# Patient Record
Sex: MALE | Race: WHITE | NOT HISPANIC OR LATINO | Employment: FULL TIME | URBAN - METROPOLITAN AREA
[De-identification: names, ages, dates, MRNs, and addresses within clinical notes are randomized per-mention and may not be internally consistent; named-entity substitution may affect disease eponyms.]

---

## 2021-06-05 ENCOUNTER — OFFICE VISIT (OUTPATIENT)
Dept: URGENT CARE | Facility: CLINIC | Age: 61
End: 2021-06-05
Payer: COMMERCIAL

## 2021-06-05 ENCOUNTER — APPOINTMENT (EMERGENCY)
Dept: CT IMAGING | Facility: HOSPITAL | Age: 61
DRG: 300 | End: 2021-06-05
Payer: COMMERCIAL

## 2021-06-05 ENCOUNTER — HOSPITAL ENCOUNTER (INPATIENT)
Facility: HOSPITAL | Age: 61
LOS: 2 days | Discharge: HOME | DRG: 300 | End: 2021-06-07
Attending: EMERGENCY MEDICINE | Admitting: INTERNAL MEDICINE
Payer: COMMERCIAL

## 2021-06-05 VITALS — HEART RATE: 73 BPM | RESPIRATION RATE: 18 BRPM | DIASTOLIC BLOOD PRESSURE: 50 MMHG | SYSTOLIC BLOOD PRESSURE: 92 MMHG

## 2021-06-05 DIAGNOSIS — I83.029 VENOUS ULCER OF LEFT LOWER EXTREMITY WITH VARICOSE VEINS (HCC): ICD-10-CM

## 2021-06-05 DIAGNOSIS — S81.802A WOUND OF LEFT LOWER EXTREMITY, INITIAL ENCOUNTER: Primary | ICD-10-CM

## 2021-06-05 DIAGNOSIS — I83.892 BLEEDING FROM VARICOSE VEINS OF LEFT LOWER EXTREMITY: Primary | ICD-10-CM

## 2021-06-05 DIAGNOSIS — L97.929 VENOUS ULCER OF LEFT LOWER EXTREMITY WITH VARICOSE VEINS (HCC): ICD-10-CM

## 2021-06-05 DIAGNOSIS — L03.116 CELLULITIS OF LEFT LEG: ICD-10-CM

## 2021-06-05 PROBLEM — I83.899 BLEEDING FROM VARICOSE VEIN: Status: ACTIVE | Noted: 2021-06-05

## 2021-06-05 PROBLEM — D62 ACUTE BLOOD LOSS ANEMIA: Status: ACTIVE | Noted: 2021-06-05

## 2021-06-05 PROBLEM — E66.9 OBESITY: Status: ACTIVE | Noted: 2021-06-05

## 2021-06-05 PROBLEM — I10 ESSENTIAL HYPERTENSION: Status: ACTIVE | Noted: 2017-12-11

## 2021-06-05 LAB
ALBUMIN SERPL BCP-MCNC: 3 G/DL (ref 3.5–5)
ALP SERPL-CCNC: 72 U/L (ref 46–116)
ALT SERPL W P-5'-P-CCNC: 37 U/L (ref 12–78)
ANION GAP SERPL CALCULATED.3IONS-SCNC: 10 MMOL/L (ref 4–13)
APTT PPP: 25 SECONDS (ref 23–37)
AST SERPL W P-5'-P-CCNC: 26 U/L (ref 5–45)
BASOPHILS # BLD AUTO: 0.03 THOUSANDS/ΜL (ref 0–0.1)
BASOPHILS NFR BLD AUTO: 0 % (ref 0–1)
BILIRUB SERPL-MCNC: 0.71 MG/DL (ref 0.2–1)
BUN SERPL-MCNC: 24 MG/DL (ref 5–25)
CALCIUM ALBUM COR SERPL-MCNC: 9 MG/DL (ref 8.3–10.1)
CALCIUM SERPL-MCNC: 8.2 MG/DL (ref 8.3–10.1)
CHLORIDE SERPL-SCNC: 106 MMOL/L (ref 100–108)
CO2 SERPL-SCNC: 25 MMOL/L (ref 21–32)
CREAT SERPL-MCNC: 1.25 MG/DL (ref 0.6–1.3)
EOSINOPHIL # BLD AUTO: 0.41 THOUSAND/ΜL (ref 0–0.61)
EOSINOPHIL NFR BLD AUTO: 5 % (ref 0–6)
ERYTHROCYTE [DISTWIDTH] IN BLOOD BY AUTOMATED COUNT: 14.1 % (ref 11.6–15.1)
GFR SERPL CREATININE-BSD FRML MDRD: 63 ML/MIN/1.73SQ M
GLUCOSE SERPL-MCNC: 137 MG/DL (ref 65–140)
HCT VFR BLD AUTO: 35.9 % (ref 36.5–49.3)
HGB BLD-MCNC: 11.1 G/DL (ref 12–17)
IMM GRANULOCYTES # BLD AUTO: 0.05 THOUSAND/UL (ref 0–0.2)
IMM GRANULOCYTES NFR BLD AUTO: 1 % (ref 0–2)
INR PPP: 1.19 (ref 0.84–1.19)
LYMPHOCYTES # BLD AUTO: 1.25 THOUSANDS/ΜL (ref 0.6–4.47)
LYMPHOCYTES NFR BLD AUTO: 15 % (ref 14–44)
MCH RBC QN AUTO: 26.2 PG (ref 26.8–34.3)
MCHC RBC AUTO-ENTMCNC: 30.9 G/DL (ref 31.4–37.4)
MCV RBC AUTO: 85 FL (ref 82–98)
MONOCYTES # BLD AUTO: 0.65 THOUSAND/ΜL (ref 0.17–1.22)
MONOCYTES NFR BLD AUTO: 8 % (ref 4–12)
NEUTROPHILS # BLD AUTO: 6.22 THOUSANDS/ΜL (ref 1.85–7.62)
NEUTS SEG NFR BLD AUTO: 71 % (ref 43–75)
NRBC BLD AUTO-RTO: 0 /100 WBCS
PLATELET # BLD AUTO: 297 THOUSANDS/UL (ref 149–390)
PMV BLD AUTO: 9.6 FL (ref 8.9–12.7)
POTASSIUM SERPL-SCNC: 4.6 MMOL/L (ref 3.5–5.3)
PROT SERPL-MCNC: 6.4 G/DL (ref 6.4–8.2)
PROTHROMBIN TIME: 14.5 SECONDS (ref 11.6–14.5)
RBC # BLD AUTO: 4.23 MILLION/UL (ref 3.88–5.62)
SODIUM SERPL-SCNC: 141 MMOL/L (ref 136–145)
WBC # BLD AUTO: 8.61 THOUSAND/UL (ref 4.31–10.16)

## 2021-06-05 PROCEDURE — 99285 EMERGENCY DEPT VISIT HI MDM: CPT

## 2021-06-05 PROCEDURE — 73701 CT LOWER EXTREMITY W/DYE: CPT

## 2021-06-05 PROCEDURE — 99285 EMERGENCY DEPT VISIT HI MDM: CPT | Performed by: EMERGENCY MEDICINE

## 2021-06-05 PROCEDURE — 85610 PROTHROMBIN TIME: CPT | Performed by: EMERGENCY MEDICINE

## 2021-06-05 PROCEDURE — 96365 THER/PROPH/DIAG IV INF INIT: CPT

## 2021-06-05 PROCEDURE — 80053 COMPREHEN METABOLIC PANEL: CPT | Performed by: EMERGENCY MEDICINE

## 2021-06-05 PROCEDURE — 99203 OFFICE O/P NEW LOW 30 MIN: CPT | Performed by: PREVENTIVE MEDICINE

## 2021-06-05 PROCEDURE — 85025 COMPLETE CBC W/AUTO DIFF WBC: CPT | Performed by: EMERGENCY MEDICINE

## 2021-06-05 PROCEDURE — 36415 COLL VENOUS BLD VENIPUNCTURE: CPT | Performed by: EMERGENCY MEDICINE

## 2021-06-05 PROCEDURE — 99222 1ST HOSP IP/OBS MODERATE 55: CPT | Performed by: INTERNAL MEDICINE

## 2021-06-05 PROCEDURE — S9088 SERVICES PROVIDED IN URGENT: HCPCS | Performed by: PREVENTIVE MEDICINE

## 2021-06-05 PROCEDURE — 96375 TX/PRO/DX INJ NEW DRUG ADDON: CPT

## 2021-06-05 PROCEDURE — 85730 THROMBOPLASTIN TIME PARTIAL: CPT | Performed by: EMERGENCY MEDICINE

## 2021-06-05 RX ORDER — OXYCODONE HYDROCHLORIDE 5 MG/1
7.5 TABLET ORAL EVERY 4 HOURS PRN
Status: DISCONTINUED | OUTPATIENT
Start: 2021-06-05 | End: 2021-06-07 | Stop reason: HOSPADM

## 2021-06-05 RX ORDER — ONDANSETRON 2 MG/ML
4 INJECTION INTRAMUSCULAR; INTRAVENOUS EVERY 6 HOURS PRN
Status: DISCONTINUED | OUTPATIENT
Start: 2021-06-05 | End: 2021-06-07 | Stop reason: HOSPADM

## 2021-06-05 RX ORDER — COLLAGENASE SANTYL 250 [ARB'U]/G
OINTMENT TOPICAL
Status: ON HOLD | COMMUNITY
Start: 2021-05-26 | End: 2021-08-12 | Stop reason: ALTCHOICE

## 2021-06-05 RX ORDER — FEXOFENADINE HCL 180 MG/1
1 TABLET ORAL DAILY
COMMUNITY

## 2021-06-05 RX ORDER — ACETAMINOPHEN 325 MG/1
975 TABLET ORAL ONCE
Status: DISCONTINUED | OUTPATIENT
Start: 2021-06-05 | End: 2021-06-05 | Stop reason: HOSPADM

## 2021-06-05 RX ORDER — LISINOPRIL 20 MG/1
TABLET ORAL
COMMUNITY
Start: 2021-03-11 | End: 2022-02-22

## 2021-06-05 RX ORDER — ACETAMINOPHEN 325 MG/1
650 TABLET ORAL EVERY 6 HOURS PRN
Status: DISCONTINUED | OUTPATIENT
Start: 2021-06-05 | End: 2021-06-07 | Stop reason: HOSPADM

## 2021-06-05 RX ORDER — NAPROXEN SODIUM 220 MG
1 TABLET ORAL EVERY 8 HOURS
COMMUNITY

## 2021-06-05 RX ORDER — CEPHALEXIN 500 MG/1
500 CAPSULE ORAL 4 TIMES DAILY
COMMUNITY
Start: 2021-06-01 | End: 2021-06-07 | Stop reason: HOSPADM

## 2021-06-05 RX ORDER — MORPHINE SULFATE 4 MG/ML
4 INJECTION, SOLUTION INTRAMUSCULAR; INTRAVENOUS ONCE
Status: COMPLETED | OUTPATIENT
Start: 2021-06-05 | End: 2021-06-05

## 2021-06-05 RX ORDER — SERTRALINE HYDROCHLORIDE 100 MG/1
TABLET, FILM COATED ORAL
COMMUNITY
Start: 2021-03-11 | End: 2022-02-22 | Stop reason: SDUPTHER

## 2021-06-05 RX ORDER — CLINDAMYCIN PHOSPHATE 600 MG/50ML
600 INJECTION INTRAVENOUS ONCE
Status: COMPLETED | OUTPATIENT
Start: 2021-06-05 | End: 2021-06-05

## 2021-06-05 RX ORDER — METOPROLOL SUCCINATE 100 MG/1
TABLET, EXTENDED RELEASE ORAL
COMMUNITY
Start: 2021-03-11 | End: 2022-02-22 | Stop reason: SDUPTHER

## 2021-06-05 RX ORDER — OXYCODONE HYDROCHLORIDE 5 MG/1
5 TABLET ORAL EVERY 4 HOURS PRN
Status: DISCONTINUED | OUTPATIENT
Start: 2021-06-05 | End: 2021-06-07 | Stop reason: HOSPADM

## 2021-06-05 RX ORDER — METOPROLOL SUCCINATE 100 MG/1
100 TABLET, EXTENDED RELEASE ORAL DAILY
Status: DISCONTINUED | OUTPATIENT
Start: 2021-06-06 | End: 2021-06-07 | Stop reason: HOSPADM

## 2021-06-05 RX ORDER — SERTRALINE HYDROCHLORIDE 25 MG/1
25 TABLET, FILM COATED ORAL DAILY
Status: DISCONTINUED | OUTPATIENT
Start: 2021-06-06 | End: 2021-06-07 | Stop reason: HOSPADM

## 2021-06-05 RX ORDER — LORATADINE 10 MG/1
10 TABLET ORAL DAILY
Status: DISCONTINUED | OUTPATIENT
Start: 2021-06-06 | End: 2021-06-07 | Stop reason: HOSPADM

## 2021-06-05 RX ORDER — ACETAMINOPHEN AND CODEINE PHOSPHATE 300; 30 MG/1; MG/1
1 TABLET ORAL EVERY 12 HOURS PRN
COMMUNITY
Start: 2021-06-01 | End: 2021-06-08

## 2021-06-05 RX ORDER — HYDROMORPHONE HCL IN WATER/PF 6 MG/30 ML
0.2 PATIENT CONTROLLED ANALGESIA SYRINGE INTRAVENOUS EVERY 4 HOURS PRN
Status: DISCONTINUED | OUTPATIENT
Start: 2021-06-05 | End: 2021-06-07 | Stop reason: HOSPADM

## 2021-06-05 RX ADMIN — AMPICILLIN SODIUM AND SULBACTAM SODIUM 3 G: 10; 5 INJECTION, POWDER, FOR SOLUTION INTRAVENOUS at 22:54

## 2021-06-05 RX ADMIN — IOHEXOL 100 ML: 350 INJECTION, SOLUTION INTRAVENOUS at 18:22

## 2021-06-05 RX ADMIN — OXYCODONE HYDROCHLORIDE 7.5 MG: 5 TABLET ORAL at 23:49

## 2021-06-05 RX ADMIN — CLINDAMYCIN PHOSPHATE 600 MG: 600 INJECTION, SOLUTION INTRAVENOUS at 16:28

## 2021-06-05 RX ADMIN — VANCOMYCIN HYDROCHLORIDE 2000 MG: 1 INJECTION, POWDER, LYOPHILIZED, FOR SOLUTION INTRAVENOUS at 23:39

## 2021-06-05 RX ADMIN — MORPHINE SULFATE 4 MG: 4 INJECTION INTRAVENOUS at 17:29

## 2021-06-05 RX ADMIN — ACETAMINOPHEN 975 MG: 325 TABLET ORAL at 15:00

## 2021-06-05 NOTE — PATIENT INSTRUCTIONS
Venous Insufficiency   AMBULATORY CARE:   Venous insufficiency  is a condition that prevents blood from flowing out of your legs and back to your heart  Veins contain valves that help blood flow in one direction  Venous insufficiency means the valves do not close correctly or fully  Blood flows back and pools in your leg  This can cause problems such as varicose veins  Venous insufficiency may also be called chronic venous insufficiency or venous stasis  Common signs and symptoms:   · Visible veins on your legs that may be small and red or large, thick, and blue    · Swelling in your ankles or calves    · Changes in skin color, such as dark or purple skin    · An ulcer (open sore) on your leg    · Leg pain that is worse when you are menstruating (women) or when you stand, and better when you elevate your legs    · Burning or itching    · Cramps that happen at night    · Thick, hard skin on your legs and ankles    · Feeling of heaviness in your legs    Seek care immediately if:   · You have a wound that does not heal or is infected  · You have an injury that has broken your skin and caused your varicose veins to bleed  · Your leg is swollen and hard  · You have pain in your leg that does not go away or gets worse  · Your legs or feet are turning blue or black  · Your leg feels warm, tender, and painful  It may look swollen and red  Contact your healthcare provider if:   · You have a fever  · You have varicose veins and they are painful  · You have new or worsening leg pain, swelling, or redness  · You have new or worsening ulcers or other sores on your leg  · You have questions or concerns about your condition or care  Treatment  may include any of the following:  · Medicine  may be given to improve blood flow  The medicines may thin your blood or reduce swelling to help blood flow  You may also need medicine to treat a bacterial infection      · Ablation  is a procedure used to close varicose veins  A catheter is guided until it is near the vein  A device will then be guided to the area  The device may produce energy through radiofrequency or a laser  The energy creates heat that will close the blood vessel  · Sclerotherapy  is a procedure used to fade visible veins  Your healthcare provider will inject a liquid into a spider vein or varicose vein  The liquid causes irritation in the vein  The vein swells and sticks together  Your body will then absorb the vein  · Surgery  may be needed if other treatments do not work  Surgery may be used to repair a leg vein valve or to clip or tie off a vein so blood cannot flow through it  You may need to have a veins removed during surgery called stripping  Surgery may be used to bypass (go around) the damaged vein  Blood will flow through a vein transplanted from another part of your body  Manage your symptoms:   · Wear pressure stockings as directed  Pressure stockings help keep blood from pooling in your leg veins  Your healthcare provider can prescribe stockings that are right for you  Do not buy over-the-counter pressure stockings unless your healthcare provider says it is okay  They may not fit correctly or may have elastic that cuts off your circulation  Ask your healthcare provider when to start wearing pressure stockings and how long to wear them each day  · Do not sit or stand for long periods of time  If you have to sit for a long time, flex and extend your legs, feet, and ankles  Do this about 10 times every 30 minutes to help keep blood flowing  If you have to stand for a long time, take breaks and sit with your legs elevated  · Elevate your legs  Elevate your legs above the level of your heart to reduce swelling  Your healthcare provider may recommend that you keep your legs elevated for 30 minutes at a time  You may need to do this 3 to 4 times per day, or more if your healthcare provider recommends  · Do not smoke  Nicotine and other chemicals in cigarettes and cigars can cause blood vessel damage  Ask your healthcare provider for information if you currently smoke and need help to quit  E-cigarettes or smokeless tobacco still contain nicotine  Talk to your healthcare provider before you use these products  · Reach or maintain a healthy weight  Extra weight can make venous insufficiency worse  Ask your healthcare provider how much you should weigh  He can help you create a weight loss plan if you need to lose weight  · Exercise as directed  Walking can help increase blood flow in your calves  Ask your healthcare provider how much exercise you need each day and which exercises are best for you  · Care for your skin  Keep your skin clean  Do not use any soaps or lotions that may dry your skin  For example, do not use products that contain fragrance or alcohol  If you have a skin ulcer, your healthcare provider may recommend a wet-to-dry bandage  To do this, apply a wet bandage to your wound and allow it to dry  This will help remove drainage from your wound each time you change the bandage  Your healthcare provider will tell you how often to change your bandage and which kind of bandage to use  Check your wound for signs of infection, such as swelling or pus  · Go to physical therapy (PT) as directed  A physical therapist can help you increase movement and range of motion in your legs  Follow up with your healthcare provider as directed:  Write down your questions so you remember to ask them during your visits  © Copyright [address] Information is for End User's use only and may not be sold, redistributed or otherwise used for commercial purposes  All illustrations and images included in CareNotes® are the copyrighted property of [employer]  or [facility] [employer]eria   The above information is an  only  It is not intended as medical advice for individual conditions or treatments   Talk to your doctor, nurse or pharmacist before following any medical regimen to see if it is safe and effective for you

## 2021-06-05 NOTE — PROGRESS NOTES
[facility]        NAME: Bishop Ahmadi is a 62 y o  male  : 1960    MRN: [id number]  DATE: 2021  TIME: 3:27 PM    Assessment and Plan   Bleeding from varicose veins of left lower extremity [I83 [id number]]  1  Bleeding from varicose veins of left lower extremity  Transfer to other facility    acetaminophen (TYLENOL) tablet 975 mg         Patient Instructions       Follow up with PCP in 3-5 days  Proceed to  ER if symptoms worsen  Chief Complaint     Chief Complaint   Patient presents with    Bleeding/Bruising     L leg bleeding         History of Present Illness       62years old the male presents for evaluation left lower leg bleeding  There is no significant injury event  He has long-lasting history of lower leg ulcer from venous insufficiency  He has been to wound care  Today without injury event, he started to have bleeding from his the lateral lower leg  His wife wrap him a with the compression dressing and sent here  In evaluation, there is up skin defect at the squirting blood  Blood loss 500-1000cc in dressing  Tourniquet, Compression and Silver nitrate was applied  The bleeding was eventually stopped  The patient started to complain dizziness and light headedness afterward,pale and sweating  blood pressure 92/50,  He was put on 4 L oxygen, symptom improving  Ambulance was called and sent to ER  Review of Systems   Review of Systems   Constitutional: Negative  HENT: Negative  Respiratory: Negative  Cardiovascular: Negative  Skin: Positive for color change and wound  Neurological: Positive for dizziness and light-headedness  All other systems reviewed and are negative          Current Medications       Current Outpatient Medications:     acetaminophen-codeine (TYLENOL/CODEINE #3) 300-30 MG per tablet, Take 1 tablet by mouth every 12 (twelve) hours as needed, Disp: , Rfl:     cephalexin (KEFLEX) 500 mg capsule, Take 500 mg by mouth 4 (four) times a day, Disp: , Rfl:     fexofenadine (ALLEGRA) 180 MG tablet, Take 1 tablet by mouth daily, Disp: , Rfl:     lisinopril (ZESTRIL) 20 mg tablet, , Disp: , Rfl:     metoprolol succinate (TOPROL-XL) 100 mg 24 hr tablet, , Disp: , Rfl:     naproxen sodium (ALEVE) 220 MG tablet, Take 1 tablet by mouth every 8 (eight) hours, Disp: , Rfl:     Santyl ointment, , Disp: , Rfl:     sertraline (ZOLOFT) 100 mg tablet, , Disp: , Rfl:     Current Facility-Administered Medications:     acetaminophen (TYLENOL) tablet 975 mg, 975 mg, Oral, Once, Justin Chávez MD    Current Allergies     Allergies as of 06/05/2021    (No Known Allergies)            The following portions of the patient's history were reviewed and updated as appropriate: allergies, current medications, past family history, past medical history, past social history, past surgical history and problem list      Past Medical History:   Diagnosis Date    Anxiety     Hypertension     Venous stasis ulcers of both lower extremities ([facility] [address] )        History reviewed  No pertinent surgical history  History reviewed  No pertinent family history  Medications have been verified  Objective   BP 92/50   Pulse 73   Resp 18        Physical Exam     Physical Exam  Vitals signs and nursing note reviewed  Constitutional:       General: He is in acute distress  Appearance: He is well-developed  Cardiovascular:      Rate and Rhythm: Normal rate and regular rhythm  Pulmonary:      Effort: Pulmonary effort is normal       Breath sounds: Normal breath sounds  Abdominal:      General: Bowel sounds are normal       Palpations: Abdomen is soft  Skin:     General: Skin is warm  Capillary Refill: Capillary refill takes less than 2 seconds  Findings: Lesion and wound present

## 2021-06-05 NOTE — ED PROVIDER NOTES
History  Chief Complaint   Patient presents with    Skin Lesion w/Prolonged Bleeding     Patient has known ulcer on his left leg that he had checked at urgent care due to bleeding and they stated that he lost appoximately 1 L per EMS     Sent in from urgent care for bleeding from chronic wound on left lower extremity  Wound appears dusky  Has 2 areas of acute punctate bleeding  Report was patient lost approximately 1 L of blood prior to arrival from this wound  No blood thinners  With states the wound is secondary to venous stasis blisters that have sloughed off  No fevers or chills, no tracking redness  Recently started keflex OP but does not seem to be improving the wound clinically  Prior to Admission Medications   Prescriptions Last Dose Informant Patient Reported? Taking? Santyl ointment 6/5/2021 at Unknown time  Yes Yes   acetaminophen-codeine (TYLENOL/CODEINE #3) 300-30 MG per tablet 6/4/2021 at Unknown time  Yes Yes   Sig: Take 1 tablet by mouth every 12 (twelve) hours as needed   cephalexin (KEFLEX) 500 mg capsule 6/4/2021 at Unknown time  Yes Yes   Sig: Take 500 mg by mouth 4 (four) times a day   fexofenadine (ALLEGRA) 180 MG tablet 6/5/2021 at Unknown time  Yes Yes   Sig: Take 1 tablet by mouth daily   lisinopril (ZESTRIL) 20 mg tablet 6/5/2021 at Unknown time  Yes Yes   metoprolol succinate (TOPROL-XL) 100 mg 24 hr tablet 6/5/2021 at Unknown time  Yes Yes   naproxen sodium (ALEVE) 220 MG tablet 6/5/2021 at Unknown time  Yes Yes   Sig: Take 1 tablet by mouth every 8 (eight) hours   sertraline (ZOLOFT) 100 mg tablet 6/5/2021 at Unknown time  Yes Yes      Facility-Administered Medications Last Administration Doses Remaining   acetaminophen (TYLENOL) tablet 975 mg 6/5/2021  3:00 PM 0          Past Medical History:   Diagnosis Date    Anxiety     Hypertension     Venous stasis ulcers of both lower extremities ([facility] [address] )        History reviewed  No pertinent surgical history      History reviewed  No pertinent family history  I have reviewed and agree with the history as documented  E-Cigarette/Vaping     E-Cigarette/Vaping Substances     Social History     Tobacco Use    Smoking status: Never Smoker    Smokeless tobacco: Never Used   Substance Use Topics    Alcohol use: Yes     Alcohol/week: 5 0 standard drinks     Types: 3 Cans of beer, 2 Shots of liquor per week     Frequency: 4 or more times a week     Drinks per session: 3 or 4     Binge frequency: Never    Drug use: Never       Review of Systems   Constitutional: Negative for chills and fever  Respiratory: Negative for chest tightness and shortness of breath  Cardiovascular: Positive for leg swelling (Bilateral)  Negative for chest pain  Gastrointestinal: Negative for abdominal pain, nausea and vomiting  Musculoskeletal: Negative for back pain and neck pain  Left lower extremity pain   Skin: Positive for rash and wound (LLE, distal tib fib, anterior, with acute bleeding today)  Neurological: Negative for dizziness and headaches  Hematological: Does not bruise/bleed easily  Physical Exam  Physical Exam  Vitals signs reviewed  Constitutional:       General: He is not in acute distress  Appearance: He is well-developed  He is obese  He is not diaphoretic  HENT:      Head: Normocephalic and atraumatic  Eyes:      Conjunctiva/sclera: Conjunctivae normal    Neck:      Musculoskeletal: Normal range of motion and neck supple  Pulmonary:      Effort: Pulmonary effort is normal  No respiratory distress  Breath sounds: Normal breath sounds  Musculoskeletal: Normal range of motion  General: Tenderness ( left lower extremity wound) and signs of injury (Wound to left lower extremity, anterior distal tib-fib  Multiple areas of ulceration with some dusky skin ) present  Right lower leg: Edema present  Left lower leg: Edema present        Comments: Pulses intact to affected extremity Skin:     General: Skin is warm and dry  Coloration: Skin is not pale  Findings: Erythema ( erythema, pus drainage, areas of bleeding, controlled with surgical foam ) present  Neurological:      Mental Status: He is alert and oriented to person, place, and time  Cranial Nerves: No cranial nerve deficit     Psychiatric:         Behavior: Behavior normal          Vital Signs  ED Triage Vitals   Temperature Pulse Respirations Blood Pressure SpO2   06/05/21 1550 06/05/21 1550 06/05/21 1550 06/05/21 1550 06/05/21 1550   98 3 °F (36 8 °C) 76 18 134/77 99 %      Temp Source Heart Rate Source Patient Position - Orthostatic VS BP Location FiO2 (%)   06/05/21 1550 06/05/21 1550 06/05/21 1550 06/05/21 1550 --   Oral Monitor Lying Right arm       Pain Score       06/05/21 1729       6           Vitals:    06/05/21 1730 06/05/21 2000 06/05/21 2327 06/06/21 0719   BP: 134/81 125/80 132/76 132/84   Pulse: 81 74 79 72   Patient Position - Orthostatic VS: Lying Lying           Visual Acuity      ED Medications  Medications   loratadine (CLARITIN) tablet 10 mg (10 mg Oral Given 6/6/21 0830)   metoprolol succinate (TOPROL-XL) 24 hr tablet 100 mg (100 mg Oral Given 6/6/21 0830)   sertraline (ZOLOFT) tablet 25 mg (25 mg Oral Given 6/6/21 0830)   ampicillin-sulbactam (UNASYN) 3 g in sodium chloride 0 9 % 100 mL IVPB (3 g Intravenous New Bag 6/6/21 0541)   vancomycin (VANCOCIN) 2,000 mg in sodium chloride 0 9 % 500 mL IVPB (2,000 mg Intravenous New Bag 6/5/21 4189)   enoxaparin (LOVENOX) subcutaneous injection 40 mg (40 mg Subcutaneous Given 6/6/21 0830)   acetaminophen (TYLENOL) tablet 650 mg (has no administration in time range)   ondansetron (ZOFRAN) injection 4 mg (has no administration in time range)   oxyCODONE (ROXICODONE) IR tablet 5 mg (has no administration in time range)   oxyCODONE (ROXICODONE) IR tablet 7 5 mg (7 5 mg Oral Given 6/6/21 1960)   HYDROmorphone HCl (DILAUDID) injection 0 2 mg (has no administration in time range)   clindamycin (CLEOCIN) IVPB (premix in dextrose) 600 mg 50 mL (0 mg Intravenous Stopped 6/5/21 1658)   morphine (PF) 4 mg/mL injection 4 mg (4 mg Intravenous Given 6/5/21 1729)   iohexol (OMNIPAQUE) 350 MG/ML injection (SINGLE-DOSE) 100 mL (100 mL Intravenous Given 6/5/21 1822)       Diagnostic Studies  Results Reviewed     Procedure Component Value Units Date/Time    MRSA culture [[id number]] Collected: 06/06/21 0849    Lab Status: No result Specimen: Nares from Nose     Comprehensive metabolic panel [[id number]]  (Abnormal) Collected: 06/06/21 0543    Lab Status: Final result Specimen: Blood from Arm, Right Updated: 06/06/21 0715     Sodium 139 mmol/L      Potassium 4 4 mmol/L      Chloride 106 mmol/L      CO2 25 mmol/L      ANION GAP 8 mmol/L      BUN 18 mg/dL      Creatinine 0 94 mg/dL      Glucose 117 mg/dL      Calcium 8 2 mg/dL      Corrected Calcium 9 2 mg/dL      AST 15 U/L      ALT 30 U/L      Alkaline Phosphatase 62 U/L      Total Protein 6 3 g/dL      Albumin 2 8 g/dL      Total Bilirubin 0 41 mg/dL      eGFR 88 ml/min/1 73sq m     Narrative:      [place] guidelines for Chronic Kidney Disease (CKD):     Stage 1 with normal or high GFR (GFR > 90 mL/min/1 73 square meters)    Stage 2 Mild CKD (GFR = 60-89 mL/min/1 73 square meters)    Stage 3A Moderate CKD (GFR = 45-59 mL/min/1 73 square meters)    Stage 3B Moderate CKD (GFR = 30-44 mL/min/1 73 square meters)    Stage 4 Severe CKD (GFR = 15-29 mL/min/1 73 square meters)    Stage 5 End Stage CKD (GFR <15 mL/min/1 73 square meters)  Note: GFR calculation is accurate only with a steady state creatinine    CBC and differential [[id number]]  (Abnormal) Collected: 06/06/21 0543    Lab Status: Final result Specimen: Blood from Arm, Right Updated: 06/06/21 0657     WBC 8 70 Thousand/uL      RBC 3 87 Million/uL      Hemoglobin 10 4 g/dL      Hematocrit 33 3 %      MCV 86 fL      MCH 26 9 pg      MCHC 31 2 g/dL      RDW 14 3 %      MPV 9 6 fL      Platelets 347 Thousands/uL      nRBC 0 /100 WBCs      Neutrophils Relative 76 %      Immat GRANS % 1 %      Lymphocytes Relative 13 %      Monocytes Relative 7 %      Eosinophils Relative 3 %      Basophils Relative 0 %      Neutrophils Absolute 6 62 Thousands/µL      Immature Grans Absolute 0 05 Thousand/uL      Lymphocytes Absolute 1 11 Thousands/µL      Monocytes Absolute 0 63 Thousand/µL      Eosinophils Absolute 0 26 Thousand/µL      Basophils Absolute 0 03 Thousands/µL     Platelet count [[id number]]     Lab Status: No result Specimen: Blood     Comprehensive metabolic panel [[id number]]  (Abnormal) Collected: 06/05/21 1626    Lab Status: Final result Specimen: Blood from Arm, Right Updated: 06/05/21 1651     Sodium 141 mmol/L      Potassium 4 6 mmol/L      Chloride 106 mmol/L      CO2 25 mmol/L      ANION GAP 10 mmol/L      BUN 24 mg/dL      Creatinine 1 25 mg/dL      Glucose 137 mg/dL      Calcium 8 2 mg/dL      Corrected Calcium 9 0 mg/dL      AST 26 U/L      ALT 37 U/L      Alkaline Phosphatase 72 U/L      Total Protein 6 4 g/dL      Albumin 3 0 g/dL      Total Bilirubin 0 71 mg/dL      eGFR 63 ml/min/1 73sq m     Narrative:      [place] guidelines for Chronic Kidney Disease (CKD):     Stage 1 with normal or high GFR (GFR > 90 mL/min/1 73 square meters)    Stage 2 Mild CKD (GFR = 60-89 mL/min/1 73 square meters)    Stage 3A Moderate CKD (GFR = 45-59 mL/min/1 73 square meters)    Stage 3B Moderate CKD (GFR = 30-44 mL/min/1 73 square meters)    Stage 4 Severe CKD (GFR = 15-29 mL/min/1 73 square meters)    Stage 5 End Stage CKD (GFR <15 mL/min/1 73 square meters)  Note: GFR calculation is accurate only with a steady state creatinine    Protime-INR [[id number]]  (Normal) Collected: 06/05/21 1626    Lab Status: Final result Specimen: Blood from Arm, Right Updated: 06/05/21 1643     Protime 14 5 seconds      INR 1 19    APTT [[id number]] (Normal) Collected: 06/05/21 1626    Lab Status: Final result Specimen: Blood from Arm, Right Updated: 06/05/21 1643     PTT 25 seconds     CBC and differential [[id number]]  (Abnormal) Collected: 06/05/21 1626    Lab Status: Final result Specimen: Blood from Arm, Right Updated: 06/05/21 1632     WBC 8 61 Thousand/uL      RBC 4 23 Million/uL      Hemoglobin 11 1 g/dL      Hematocrit 35 9 %      MCV 85 fL      MCH 26 2 pg      MCHC 30 9 g/dL      RDW 14 1 %      MPV 9 6 fL      Platelets 549 Thousands/uL      nRBC 0 /100 WBCs      Neutrophils Relative 71 %      Immat GRANS % 1 %      Lymphocytes Relative 15 %      Monocytes Relative 8 %      Eosinophils Relative 5 %      Basophils Relative 0 %      Neutrophils Absolute 6 22 Thousands/µL      Immature Grans Absolute 0 05 Thousand/uL      Lymphocytes Absolute 1 25 Thousands/µL      Monocytes Absolute 0 65 Thousand/µL      Eosinophils Absolute 0 41 Thousand/µL      Basophils Absolute 0 03 Thousands/µL                  CT lower extremity w contrast left   Final Result by Taisha Crouch MD (06/05 1948)      Pretibial wound without superficial collections or subjacent evidence for osteomyelitis  Workstation performed: [id number]                    Procedures  Procedures         ED Course                                           MDM  Number of Diagnoses or Management Options  Diagnosis management comments: Left lower extremity wound  Bleeding controlled with surgifoam   Septic workup and imaging to further evaluate for infection  IV antibiotics  Likely will require admission for antibiotic failure for cellulitis and wound care         Amount and/or Complexity of Data Reviewed  Tests in the radiology section of CPT®: ordered and reviewed        Disposition  Final diagnoses:   Wound of left lower extremity, initial encounter   Cellulitis of left leg     Time reflects when diagnosis was documented in both MDM as applicable and the Disposition within this note Time User Action Codes Description Comment    6/5/2021  7:11 PM Roby Almendarez Add [H15 695F] Wound of left lower extremity, initial encounter     6/5/2021  7:11 PM Roby Almendarez Add [L03 [id number]] Cellulitis of left leg       ED Disposition     ED Disposition Condition Date/Time Comment    Admit Stable Sat Jun 5, 2021  8:43 PM Case was discussed with Bin Huff and the patient's admission status was agreed to be Admission Status: inpatient status to the service of Dr Bin Huff   Follow-up Information    None         Current Discharge Medication List      CONTINUE these medications which have NOT CHANGED    Details   acetaminophen-codeine (TYLENOL/CODEINE #3) 300-30 MG per tablet Take 1 tablet by mouth every 12 (twelve) hours as needed      cephalexin (KEFLEX) 500 mg capsule Take 500 mg by mouth 4 (four) times a day      fexofenadine (ALLEGRA) 180 MG tablet Take 1 tablet by mouth daily      lisinopril (ZESTRIL) 20 mg tablet       metoprolol succinate (TOPROL-XL) 100 mg 24 hr tablet       naproxen sodium (ALEVE) 220 MG tablet Take 1 tablet by mouth every 8 (eight) hours      Santyl ointment       sertraline (ZOLOFT) 100 mg tablet            No discharge procedures on file      PDMP Review     None          ED Provider  Electronically Signed by           Fredy Rodriguez DO  06/06/21 9336

## 2021-06-06 PROBLEM — L97.929 VENOUS ULCER OF LEFT LOWER EXTREMITY WITH VARICOSE VEINS (HCC): Status: ACTIVE | Noted: 2021-06-05

## 2021-06-06 PROBLEM — I83.029 VENOUS ULCER OF LEFT LOWER EXTREMITY WITH VARICOSE VEINS (HCC): Status: ACTIVE | Noted: 2021-06-05

## 2021-06-06 PROBLEM — I87.332 CHRONIC VENOUS HYPERTENSION (IDIOPATHIC) WITH ULCER AND INFLAMMATION OF LEFT LOWER EXTREMITY (CODE) (HCC): Status: ACTIVE | Noted: 2021-06-06

## 2021-06-06 LAB
ALBUMIN SERPL BCP-MCNC: 2.8 G/DL (ref 3.5–5)
ALP SERPL-CCNC: 62 U/L (ref 46–116)
ALT SERPL W P-5'-P-CCNC: 30 U/L (ref 12–78)
ANION GAP SERPL CALCULATED.3IONS-SCNC: 8 MMOL/L (ref 4–13)
AST SERPL W P-5'-P-CCNC: 15 U/L (ref 5–45)
BASOPHILS # BLD AUTO: 0.03 THOUSANDS/ΜL (ref 0–0.1)
BASOPHILS NFR BLD AUTO: 0 % (ref 0–1)
BILIRUB SERPL-MCNC: 0.41 MG/DL (ref 0.2–1)
BUN SERPL-MCNC: 18 MG/DL (ref 5–25)
CALCIUM ALBUM COR SERPL-MCNC: 9.2 MG/DL (ref 8.3–10.1)
CALCIUM SERPL-MCNC: 8.2 MG/DL (ref 8.3–10.1)
CHLORIDE SERPL-SCNC: 106 MMOL/L (ref 100–108)
CO2 SERPL-SCNC: 25 MMOL/L (ref 21–32)
CREAT SERPL-MCNC: 0.94 MG/DL (ref 0.6–1.3)
EOSINOPHIL # BLD AUTO: 0.26 THOUSAND/ΜL (ref 0–0.61)
EOSINOPHIL NFR BLD AUTO: 3 % (ref 0–6)
ERYTHROCYTE [DISTWIDTH] IN BLOOD BY AUTOMATED COUNT: 14.3 % (ref 11.6–15.1)
GFR SERPL CREATININE-BSD FRML MDRD: 88 ML/MIN/1.73SQ M
GLUCOSE SERPL-MCNC: 117 MG/DL (ref 65–140)
HCT VFR BLD AUTO: 33.3 % (ref 36.5–49.3)
HGB BLD-MCNC: 10.4 G/DL (ref 12–17)
IMM GRANULOCYTES # BLD AUTO: 0.05 THOUSAND/UL (ref 0–0.2)
IMM GRANULOCYTES NFR BLD AUTO: 1 % (ref 0–2)
LYMPHOCYTES # BLD AUTO: 1.11 THOUSANDS/ΜL (ref 0.6–4.47)
LYMPHOCYTES NFR BLD AUTO: 13 % (ref 14–44)
MCH RBC QN AUTO: 26.9 PG (ref 26.8–34.3)
MCHC RBC AUTO-ENTMCNC: 31.2 G/DL (ref 31.4–37.4)
MCV RBC AUTO: 86 FL (ref 82–98)
MONOCYTES # BLD AUTO: 0.63 THOUSAND/ΜL (ref 0.17–1.22)
MONOCYTES NFR BLD AUTO: 7 % (ref 4–12)
NEUTROPHILS # BLD AUTO: 6.62 THOUSANDS/ΜL (ref 1.85–7.62)
NEUTS SEG NFR BLD AUTO: 76 % (ref 43–75)
NRBC BLD AUTO-RTO: 0 /100 WBCS
PLATELET # BLD AUTO: 258 THOUSANDS/UL (ref 149–390)
PMV BLD AUTO: 9.6 FL (ref 8.9–12.7)
POTASSIUM SERPL-SCNC: 4.4 MMOL/L (ref 3.5–5.3)
PROT SERPL-MCNC: 6.3 G/DL (ref 6.4–8.2)
RBC # BLD AUTO: 3.87 MILLION/UL (ref 3.88–5.62)
SODIUM SERPL-SCNC: 139 MMOL/L (ref 136–145)
WBC # BLD AUTO: 8.7 THOUSAND/UL (ref 4.31–10.16)

## 2021-06-06 PROCEDURE — 87205 SMEAR GRAM STAIN: CPT | Performed by: INTERNAL MEDICINE

## 2021-06-06 PROCEDURE — 85025 COMPLETE CBC W/AUTO DIFF WBC: CPT | Performed by: INTERNAL MEDICINE

## 2021-06-06 PROCEDURE — 80053 COMPREHEN METABOLIC PANEL: CPT | Performed by: INTERNAL MEDICINE

## 2021-06-06 PROCEDURE — 87077 CULTURE AEROBIC IDENTIFY: CPT | Performed by: INTERNAL MEDICINE

## 2021-06-06 PROCEDURE — 99232 SBSQ HOSP IP/OBS MODERATE 35: CPT | Performed by: INTERNAL MEDICINE

## 2021-06-06 PROCEDURE — 87186 SC STD MICRODIL/AGAR DIL: CPT | Performed by: INTERNAL MEDICINE

## 2021-06-06 PROCEDURE — 87081 CULTURE SCREEN ONLY: CPT | Performed by: INTERNAL MEDICINE

## 2021-06-06 PROCEDURE — 99254 IP/OBS CNSLTJ NEW/EST MOD 60: CPT | Performed by: PHYSICIAN ASSISTANT

## 2021-06-06 PROCEDURE — 87070 CULTURE OTHR SPECIMN AEROBIC: CPT | Performed by: INTERNAL MEDICINE

## 2021-06-06 RX ADMIN — AMPICILLIN SODIUM AND SULBACTAM SODIUM 3 G: 10; 5 INJECTION, POWDER, FOR SOLUTION INTRAVENOUS at 16:22

## 2021-06-06 RX ADMIN — OXYCODONE HYDROCHLORIDE 7.5 MG: 5 TABLET ORAL at 23:58

## 2021-06-06 RX ADMIN — ENOXAPARIN SODIUM 40 MG: 40 INJECTION SUBCUTANEOUS at 08:30

## 2021-06-06 RX ADMIN — VANCOMYCIN HYDROCHLORIDE 2000 MG: 1 INJECTION, POWDER, LYOPHILIZED, FOR SOLUTION INTRAVENOUS at 11:40

## 2021-06-06 RX ADMIN — SERTRALINE 25 MG: 25 TABLET, FILM COATED ORAL at 08:30

## 2021-06-06 RX ADMIN — OXYCODONE HYDROCHLORIDE 7.5 MG: 5 TABLET ORAL at 16:22

## 2021-06-06 RX ADMIN — AMPICILLIN SODIUM AND SULBACTAM SODIUM 3 G: 10; 5 INJECTION, POWDER, FOR SOLUTION INTRAVENOUS at 05:41

## 2021-06-06 RX ADMIN — AMPICILLIN SODIUM AND SULBACTAM SODIUM 3 G: 10; 5 INJECTION, POWDER, FOR SOLUTION INTRAVENOUS at 21:33

## 2021-06-06 RX ADMIN — LORATADINE 10 MG: 10 TABLET ORAL at 08:30

## 2021-06-06 RX ADMIN — OXYCODONE HYDROCHLORIDE 7.5 MG: 5 TABLET ORAL at 08:28

## 2021-06-06 RX ADMIN — VANCOMYCIN HYDROCHLORIDE 2000 MG: 1 INJECTION, POWDER, LYOPHILIZED, FOR SOLUTION INTRAVENOUS at 22:24

## 2021-06-06 RX ADMIN — METOPROLOL SUCCINATE 100 MG: 100 TABLET, EXTENDED RELEASE ORAL at 08:30

## 2021-06-06 RX ADMIN — AMPICILLIN SODIUM AND SULBACTAM SODIUM 3 G: 10; 5 INJECTION, POWDER, FOR SOLUTION INTRAVENOUS at 10:54

## 2021-06-06 NOTE — ASSESSMENT & PLAN NOTE
Patient would benefit from weight loss after resolution of acute illness  BMI noted of 54   Potential candidate for bariatric surgery as outpatient

## 2021-06-06 NOTE — PROGRESS NOTES
[facility]  Progress Note - Frankie Cos 1960, 62 y o  male MRN: [id number]  Unit/Bed#: -01 Encounter: [id number]  Primary Care Provider: No primary care provider on file  Date and time admitted to hospital: 6/5/2021  3:46 PM    * Venous ulcer of left lower extremity with varicose veins ([address] )  Assessment & Plan  Patient does have LLE wound which is a Chronic problem which seems to be acutely worsening as evident by worsening pain and increased discharge concern for infection  Continue broad-spectrum antibiotic coverage with Unasyn and Vancomycin, check MRSA culture, attempt to obtain wound cultures  Patient's left lower extremity has significantly increased warmth compared to right leg on palpation  Case discussed with vascular surgery  Consult wound care    Bleeding from varicose vein  Assessment & Plan  Presented with bleeding varicose veins, currently stopped  Outpatient follow-up with vascular surgery, no further inpatient management required, bleeding varicose vein was distal to current chronic wound  Suspected area of bleeding was just proximal to the left lateral malleolus    Acute blood loss anemia  Assessment & Plan  In the setting of bleeding veins noted to be anemic  Now bleeding controlled  Monitor Hb  Chronic venous hypertension (idiopathic) with ulcer and inflammation of left lower extremity (CODE) (HCC)  Assessment & Plan  See plan of care above    Essential hypertension  Assessment & Plan  Continue metoprolol succinate    Holding lisinopril    Obesity  Assessment & Plan  Patient would benefit from weight loss after resolution of acute illness  BMI noted of 54  Potential candidate for bariatric surgery as outpatient       VTE Pharmacologic Prophylaxis:   Pharmacologic: Enoxaparin (Lovenox)        Code Status: Level 1 - Full Code      Subjective:   Patient still with intermittent severe throbbing pain left lower extremity, improved with p o  Oxycodone 7 5 mg x 1 today    Objective:     Vitals:   Temp (24hrs), Av 1 °F (36 7 °C), Min:98 °F (36 7 °C), Max:98 3 °F (36 8 °C)    Temp:  [98 °F (36 7 °C)-98 3 °F (36 8 °C)] 98 °F (36 7 °C)  HR:  [72-81] 72  Resp:  [16-18] 18  BP: ()/(38-84) 132/84  SpO2:  [94 %-99 %] 95 %  Body mass index is 54 88 kg/m²  Input and Output Summary (last 24 hours): Intake/Output Summary (Last 24 hours) at 2021 0950  Last data filed at 2021 0900  Gross per 24 hour   Intake 440 ml   Output 0 ml   Net 440 ml       Physical Exam:     Physical Exam  Vitals signs and nursing note reviewed  Constitutional:       General: He is not in acute distress  Appearance: He is obese  He is not ill-appearing, toxic-appearing or diaphoretic  HENT:      Head: Normocephalic  Neck:      Musculoskeletal: Normal range of motion  Cardiovascular:      Rate and Rhythm: Normal rate  Pulses: Normal pulses  Pulmonary:      Effort: Pulmonary effort is normal    Neurological:      General: No focal deficit present  Mental Status: He is alert  Psychiatric:         Mood and Affect: Mood normal          Behavior: Behavior normal          Thought Content: Thought content normal          Judgment: Judgment normal            Additional Data:     Labs:    Results from last 7 days   Lab Units 21  0543   WBC Thousand/uL 8 70   HEMOGLOBIN g/dL 10 4*   HEMATOCRIT % 33 3*   PLATELETS Thousands/uL 258   NEUTROS PCT % 76*   LYMPHS PCT % 13*   MONOS PCT % 7   EOS PCT % 3     Results from last 7 days   Lab Units 21  0543   POTASSIUM mmol/L 4 4   CHLORIDE mmol/L 106   CO2 mmol/L 25   BUN mg/dL 18   CREATININE mg/dL 0 94   CALCIUM mg/dL 8 2*   ALK PHOS U/L 62   ALT U/L 30   AST U/L 15     Results from last 7 days   Lab Units 21  1626   INR  1 19       * I Have Reviewed All Lab Data Listed Above  * Additional Pertinent Lab Tests Reviewed:  [address] Admission Reviewed      Recent Cultures (last 7 days):           Last 24 Hours Medication List:   Current Facility-Administered Medications   Medication Dose Route Frequency Provider Last Rate    acetaminophen  650 mg Oral Q6H PRN Henry Zhang MD      ampicillin-sulbactam  3 g Intravenous Q6H Henry Zhang MD 3 g (06/06/21 1221)    enoxaparin  40 mg Subcutaneous Daily Henry Zhang MD      HYDROmorphone  0 2 mg Intravenous Q4H PRN NOEL Mcnulty      loratadine  10 mg Oral Daily Henry Zhang MD      metoprolol succinate  100 mg Oral Daily Henry Zhang MD      ondansetron  4 mg Intravenous Q6H PRN Henry Zhang MD      oxyCODONE  5 mg Oral Q4H PRN NOEL Mcnulty      oxyCODONE  7 5 mg Oral Q4H PRN NOEL Mcnulty      sertraline  25 mg Oral Daily Henry Zhang MD      vancomycin  2,000 mg Intravenous Q12H Henry Zhang MD 2,000 mg (06/05/21 8855)        Today, Patient Was Seen By: Cynthia Watkins DO

## 2021-06-06 NOTE — ASSESSMENT & PLAN NOTE
Presented with bleeding varicose veins, currently stopped  Outpatient follow-up with vascular surgery, no further inpatient management required, bleeding varicose vein was distal to current chronic wound      Suspected area of bleeding was just proximal to the left lateral malleolus

## 2021-06-06 NOTE — PLAN OF CARE
Problem: Potential for Falls  Goal: Patient will remain free of falls  Description: INTERVENTIONS:  - Assess patient frequently for physical needs  -  Identify cognitive and physical deficits and behaviors that affect risk of falls  -  [place] fall precautions as indicated by assessment   - Educate patient/family on patient safety including physical limitations  - Instruct patient to call for assistance with activity based on assessment  - Modify environment to reduce risk of injury  - Consider OT/PT consult to assist with strengthening/mobility  Outcome: Progressing     Problem: Nutrition/Hydration-ADULT  Goal: Nutrient/Hydration intake appropriate for improving, restoring or maintaining nutritional needs  Description: Monitor and assess patient's nutrition/hydration status for malnutrition  Collaborate with interdisciplinary team and initiate plan and interventions as ordered  Monitor patient's weight and dietary intake as ordered or per policy  Utilize nutrition screening tool and intervene as necessary  Determine patient's food preferences and provide high-protein, high-caloric foods as appropriate       INTERVENTIONS:  - Monitor oral intake, urinary output, labs, and treatment plans  - Assess nutrition and hydration status and recommend course of action  - Evaluate amount of meals eaten  - Assist patient with eating if necessary   - Allow adequate time for meals  - Recommend/ encourage appropriate diets, oral nutritional supplements, and vitamin/mineral supplements  - Order, calculate, and assess calorie counts as needed  - Recommend, monitor, and adjust tube feedings and TPN/PPN based on assessed needs  - Assess need for intravenous fluids  - Provide specific nutrition/hydration education as appropriate  - Include patient/family/caregiver in decisions related to nutrition  Outcome: Progressing

## 2021-06-06 NOTE — ASSESSMENT & PLAN NOTE
-continue to encourage weight management, lifestyle modifications and diet modifications for adjuvant therapy of symptomatic varicose veins and venous hypertension  -weight management imperative for control of underlying venous disease and to facilitate LLE wound healing

## 2021-06-06 NOTE — ASSESSMENT & PLAN NOTE
43-year-old male nonsmoker with morbid obesity, HTN, depression and LLE chronic venous stasis with venous hypertension and chronic venous ulceration presents with worsening pain and drainage from left lower leg wounds x 1 week, LLE cellulitis and spontaneous left lateral malleolus varicosity bleeding on day of admission  Vascular surgery consulted for recommendations of long-term management  Diagnostics:  -CT LLE w/contrast 6/5/2021:  No evidence of subcutaneous gas, abscess or osteomyelitis  Pretibial wound as noted  Significant superficial venous dilatation and varicose veins  Patent popliteal and tibial vessels w/3V runoff  -LY at [facility] 5/10/2021: ABIs 1 2 bilaterally    Recommendations:  -chronic LLE venous stasis and venous hypertension with recurrent venous ulceration and extensive circumferential tissue loss and spontaneous bleed from L lateral malleolar superficial varicosity  Bleeding controlled w/direct pressure prior to arrival in ER  No recurrent bleed  -H/H stable  Hemodynamics stable  -LLE well perfused w/2+ palpable distal pulses  -compression and elevation of LLE for edema control  -continue local wound care    Consult Wound Care  -continue antibiotics per primary service  -weight management  -will require LEVDR as outpatient w/f/u in office to discuss possible venous intervention to facilitate wound healing  -will d/w Dr Pepe Nieves

## 2021-06-06 NOTE — PLAN OF CARE
Problem: Potential for Falls  Goal: Patient will remain free of falls  Description: INTERVENTIONS:  - Assess patient frequently for physical needs  -  Identify cognitive and physical deficits and behaviors that affect risk of falls  -  [place] fall precautions as indicated by assessment   - Educate patient/family on patient safety including physical limitations  - Instruct patient to call for assistance with activity based on assessment  - Modify environment to reduce risk of injury  - Consider OT/PT consult to assist with strengthening/mobility  Outcome: Progressing     Problem: Nutrition/Hydration-ADULT  Goal: Nutrient/Hydration intake appropriate for improving, restoring or maintaining nutritional needs  Description: Monitor and assess patient's nutrition/hydration status for malnutrition  Collaborate with interdisciplinary team and initiate plan and interventions as ordered  Monitor patient's weight and dietary intake as ordered or per policy  Utilize nutrition screening tool and intervene as necessary  Determine patient's food preferences and provide high-protein, high-caloric foods as appropriate       INTERVENTIONS:  - Monitor oral intake, urinary output, labs, and treatment plans  - Assess nutrition and hydration status and recommend course of action  - Evaluate amount of meals eaten  - Assist patient with eating if necessary   - Allow adequate time for meals  - Recommend/ encourage appropriate diets, oral nutritional supplements, and vitamin/mineral supplements  - Order, calculate, and assess calorie counts as needed  - Recommend, monitor, and adjust tube feedings and TPN/PPN based on assessed needs  - Assess need for intravenous fluids  - Provide specific nutrition/hydration education as appropriate  - Include patient/family/caregiver in decisions related to nutrition  Outcome: Progressing

## 2021-06-06 NOTE — CONSULTS
[address]  1960, 62 y o  male MRN: [id number]  Unit/Bed#: -01 Encounter: [id number]  Primary Care Provider: No primary care provider on file  Date and time admitted to hospital: 6/5/2021  3:46 PM    Inpatient consult to Vascular Surgery  Consult performed by: Katie Gallegos PA-C  Consult ordered by: Kenneth Segal MD        Chronic venous hypertension (idiopathic) with ulcer and inflammation of left lower extremity (CODE) ([facility] )  Assessment & Plan  52-year-old male nonsmoker with morbid obesity, HTN, depression and LLE chronic venous stasis with venous hypertension and chronic venous ulceration presents with worsening pain and drainage from left lower leg wounds x 1 week, LLE cellulitis and spontaneous left lateral malleolus varicosity bleeding on day of admission  Vascular surgery consulted for recommendations of long-term management  Diagnostics:  -CT LLE w/contrast 6/5/2021:  No evidence of subcutaneous gas, abscess or osteomyelitis  Pretibial wound as noted  Significant superficial venous dilatation and varicose veins  Patent popliteal and tibial vessels w/3V runoff  -LY at [facility] 5/10/2021: ABIs 1 2 bilaterally    Recommendations:  -chronic LLE venous stasis and venous hypertension with recurrent venous ulceration and extensive circumferential tissue loss and spontaneous bleed from L lateral malleolar superficial varicosity  Bleeding controlled w/direct pressure prior to arrival in ER  No recurrent bleed  -H/H stable  Hemodynamics stable  -LLE well perfused w/2+ palpable distal pulses  -compression and elevation of LLE for edema control  -continue local wound care    Consult Wound Care  -continue antibiotics per primary service  -weight management  -will require LEVDR as outpatient w/f/u in office to discuss possible venous intervention to facilitate wound healing  -d/w Dr Lolly Alaniz  -will d/w Dr Philip Senior    Bleeding from varicose vein  Assessment & Plan  -spontaneous bleed from superficial Left lateral malleolar varicosity  -controlled w/direct manual pressure and local tourniquet prior to arrival ER  Site clear without recurrent bleed  -H/H stable  No transfusion required  -1st episode of varicosity bleeding  -compression and elevation LLE for edema control  -see plan as outlined above    Obesity  Assessment & Plan  -continue to encourage weight management, lifestyle modifications and diet modifications for adjuvant therapy of symptomatic varicose veins and venous hypertension  -weight management imperative for control of underlying venous disease and to facilitate LLE wound healing      Essential hypertension  Assessment & Plan  -BP well controlled  -continue current medical regimen  -management per [facility]      Consulting Service: SLIM    Chief Complaint:  Decreasing pain and drainage from left lower extremity wounds x1 week and spontaneous bleeding wound left lower extremity varicosity on day of admission    HPI: Jayy George is a 62 y o  male nonsmoker with morbid obesity, HTN, depression and LLE chronic venous stasis with venous hypertension and chronic venous ulceration presents with worsening pain and serous drainage from left lower leg wounds x 1 week, LLE cellulitis and spontaneous left lateral malleolus varicosity bleeding on day of admission  Patient reports a chronic history of recurrence left lower leg venous ulcerations over the past 4 years  His current venous ulcers present for the past year and has become progressively worse since January of this year  The wounds initially started as multiple areas of blistering which ruptured and have progressed  Patient has been managing this himself with silver cream but presented to Wound Care a month ago for formal management  Patient has been undergoing regular debridement at [address]  which has become increasingly more painful    Patient now with extensive circumferential superficial ulceration of L pretibial area  CT LLE with contrast on admission negative for evidence of soft tissue abscess or necrotizing fasciitis  Patient denies claudication or rest pain and has readily palpable 2+ DP pulses  ABIs 1 2  Patient denies prior history of DVT, PE, recurrent lower extremity cellulitis, superficial thrombophlebitis or prior history of bleeding varicosities  On the day of admission, the patient had noted bleeding from underneath his leg graft while at work which was slid controlled with direct pressure and a form of tourniquet directly over the bleeding  Bleeding with had resolved by the time he presented to the emergency room  No recurrent bleeding  H&H stable and he did not require transfusion on arrival   Vascular surgery consulted for recommendations of long-term management  Diagnostics:  -CT LLE w/contrast 6/5/2021:  No evidence of subcutaneous gas, abscess or osteomyelitis  Pretibial wound as noted  Significant superficial venous dilatation and varicose veins  Patent popliteal and tibial vessels w/3V runoff  -LY at [facility] 5/10/2021: ABIs 1 2 bilaterally      Review of Systems:  General: negative  Cardiovascular: no chest pain or dyspnea on exertion  Respiratory: no cough, shortness of breath, or wheezing  Gastrointestinal: no abdominal pain, change in bowel habits, or black or bloody stools  Genitourinary ROS: no dysuria, trouble voiding, or hematuria  Musculoskeletal ROS:  As per the above HPI  Neurological ROS: no TIA or stroke symptoms  Hematological and Lymphatic ROS: negative  Dermatological ROS: As per the above HPI  Psychological ROS: negative  Ophthalmic ROS: negative  ENT ROS: negative    Past Medical History:  Past Medical History:   Diagnosis Date    Anxiety     Hypertension     Venous stasis ulcers of both lower extremities ([address] )        Past Surgical History:  History reviewed  No pertinent surgical history      Social History:  Social History Substance and Sexual Activity   Alcohol Use Yes    Alcohol/week: 5 0 standard drinks    Types: 3 Cans of beer, 2 Shots of liquor per week    Frequency: 4 or more times a week    Drinks per session: 3 or 4    Binge frequency: Never     Social History     Substance and Sexual Activity   Drug Use Never     Social History     Tobacco Use   Smoking Status Never Smoker   Smokeless Tobacco Never Used       Family History:  History reviewed  No pertinent family history  Allergies:  No Known Allergies    Medications:  Current Facility-Administered Medications   Medication Dose Route Frequency    acetaminophen (TYLENOL) tablet 650 mg  650 mg Oral Q6H PRN    ampicillin-sulbactam (UNASYN) 3 g in sodium chloride 0 9 % 100 mL IVPB  3 g Intravenous Q6H    enoxaparin (LOVENOX) subcutaneous injection 40 mg  40 mg Subcutaneous Daily    HYDROmorphone HCl (DILAUDID) injection 0 2 mg  0 2 mg Intravenous Q4H PRN    loratadine (CLARITIN) tablet 10 mg  10 mg Oral Daily    metoprolol succinate (TOPROL-XL) 24 hr tablet 100 mg  100 mg Oral Daily    ondansetron (ZOFRAN) injection 4 mg  4 mg Intravenous Q6H PRN    oxyCODONE (ROXICODONE) IR tablet 5 mg  5 mg Oral Q4H PRN    oxyCODONE (ROXICODONE) IR tablet 7 5 mg  7 5 mg Oral Q4H PRN    sertraline (ZOLOFT) tablet 25 mg  25 mg Oral Daily    vancomycin (VANCOCIN) 2,000 mg in sodium chloride 0 9 % 500 mL IVPB  2,000 mg Intravenous Q12H       Vitals:  /84   Pulse 72   Temp 98 °F (36 7 °C)   Resp 18   Ht 5' 10" (1 778 m)   Wt (!) 174 kg (382 lb 8 oz)   SpO2 95%   BMI 54 88 kg/m²     I/Os:  I/O last 24 hours:   In: 440 [P O :440]  Out: 0     Lab Results and Cultures:   Lab Results   Component Value Date    WBC 8 70 06/06/2021    HGB 10 4 (L) 06/06/2021    HCT 33 3 (L) 06/06/2021    MCV 86 06/06/2021     06/06/2021     Lab Results   Component Value Date    CALCIUM 8 2 (L) 06/06/2021    K 4 4 06/06/2021    CO2 25 06/06/2021     06/06/2021    BUN 18 06/06/2021    CREATININE 0 94 06/06/2021     Lab Results   Component Value Date    INR 1 19 06/05/2021    PROTIME 14 5 06/05/2021       Lipid Panel: No results found for: CHOL,     Blood Culture: No results found for: BLOODCX,   Urinalysis: No results found for: Karyna Boyers, SPECGRAV, PHUR, LEUKOCYTESUR, NITRITE, PROTEINUA, GLUCOSEU, KETONESU, BILIRUBINUR, BLOODU,   Urine Culture: No results found for: URINECX,   Wound Culure: No results found for: WOUNDCULT    Imaging:  CT LLE with contrast 6/5/2021 reviewed and as described above  ABIs from [facility] 5/10/2021 also reviewed and as described above  Physical Exam:    General appearance: alert and oriented, in no acute distress  Morbidly obese  Skin: Skin color, texture, turgor normal  No rashes or lesions  Neurologic: Grossly normal  Head: Normocephalic, without obvious abnormality, atraumatic  Eyes: PERRL, EOMI  Sclerae nonicteric  Throat: lips, mucosa, and tongue normal; teeth and gums normal  Neck: no adenopathy, no carotid bruit, no JVD, supple, symmetrical, trachea midline and thyroid not enlarged, symmetric, no tenderness/mass/nodules  Back: symmetric, no curvature  ROM normal  No CVA tenderness  Lungs: clear to auscultation bilaterally  Chest wall: no tenderness  Heart: regular rate and rhythm, S1, S2 normal, no murmur, click, rub or gallop  Abdomen: soft, non-tender; bowel sounds normal; no masses,  no organomegaly and Obese but nondistended  No abdominal bruits  Extremities: Bilateral lower extremities warm, pink, motor and sensory intact and well perfused with readily palpable 2+ DP pulses bilaterally  See Clinical images below  Extensive superficial tissue loss circumferential of left lower leg with scattered yellow debris in wound  No significant eschar  No purulent drainage  Mild periwound erythema  No crepitus      Wound/Incision:    LLE anterior    LLE lateral    LLE, medial    LLE      Pulse exam:  Radial: Right: 2+ Left[de-identified] 2+  DP: Right: 2+ Left: 2+  PT: Right: 1+ Left: 1+      Kelby Meckel, PA-C  6/6/2021  [facility], [phone]

## 2021-06-06 NOTE — ASSESSMENT & PLAN NOTE
Patient does have LLE wound which is a Chronic problem which seems to be acutely worsening as evident by worsening pain and increased discharge concern for infection    Will treat with antibiotics for now start broad coverage with Unasyn and Vancomycin, check MRSA culture, attempt to obtain wound cultures  Monitor clinically, temperature curve white count  Consult vascular surgery as they may also help with f/u regarding bleeding veins  Consult wound care

## 2021-06-06 NOTE — PROGRESS NOTES
Vancomycin IV Pharmacy-to-Dose Consultation    Lianne Keating is a 62 y o  male who is currently receiving Vancomycin IV with management by the Pharmacy Consult service  Assessment/Plan:  The patient was reviewed  Renal function is stable and no signs or symptoms of nephrotoxicity and/or infusion reactions were documented in the chart  Based on todays assessment, continue current vancomycin (day # 2) dosing of 2 g IV q12h, with a plan for trough to be drawn at 1100 on 6-7-21  We will continue to follow the patients culture results and clinical progress daily      Timothy Fabian, Pharmacist

## 2021-06-06 NOTE — H&P
[facility]  H&P- Tommie Weiss 1960, 62 y o  male MRN: [id number]  Unit/Bed#: -01 Encounter: [id number]  Primary Care Provider: No primary care provider on file  Date and time admitted to hospital: 6/5/2021  3:46 PM    * Open wound of left lower extremity  Assessment & Plan  Patient does have LLE wound which is a Chronic problem which seems to be acutely worsening as evident by worsening pain and increased discharge concern for infection  Will treat with antibiotics for now start broad coverage with Unasyn and Vancomycin, check MRSA culture, attempt to obtain wound cultures  Monitor clinically, temperature curve white count  Consult vascular surgery as they may also help with f/u regarding bleeding veins  Consult wound care    Obesity  Assessment & Plan  Patient would benefit from weight loss after resolution of acute illness  BMI noted of 54  Potential candidate for bariatric surgery as outpatient     Bleeding from varicose vein  Assessment & Plan  Presented with bleeding varicose veins, currently stopped  Will need vascular surgery follow up  Acute blood loss anemia  Assessment & Plan  In the setting of bleeding veins noted to be anemic  Now bleeding controlled  Monitor Hb  Essential hypertension - continue metoprolol    VTE Prophylaxis: Enoxaparin (Lovenox)  / sequential compression device   Code Status: FC  POLST: POLST form is not discussed and not completed at this time  Discussion with family:  Wife at the bedside    Anticipated Length of Stay:  Patient will be admitted on an Inpatient basis with an anticipated length of stay of  at least 2 midnights  Justification for Hospital Stay:  Worsening left lower extremity wound with worsening pain and discharged, concern for infection, concern for acute blood loss anemia    Total Time for Visit, including Counseling / Coordination of Care: 30 minutes    Greater than 50% of this total time spent on direct patient counseling and coordination of care  Chief Complaint:   Worsening LLE wound with pain and drainage; episode of bleeding through leg veins    History of Present Illness:    Josef Roth is a 62 y o  male who presents with LLE wound  Patient presented with large wound in left lower extremity surrounding erythema pain and drainage  He does endorse chronic wounds in left lower extremity, he has been fighting this for years, he follows at the [address] [place] [address] Road  The wound has closed however for the past year or so the wound has opened again  Recently for the past week or so he has complaining of worsening pain in the area an increased amount of discharge  He otherwise denies fever  A 2nd complain that he has is that he has had episodes of bleeding veins around the wound prior to coming to the hospital   He was noted to have significant pooling of blood in the left leg because he believes that "a vein bursted", he went to urgent care 1st, they placed a tourniquet there, he subsequently came here and was no longer bleeding  Now the bleeding has stopped completely  At the emergency department Vitals were essentially stable  Blood work revealed mildly reduced calcium at 8 2, hemoglobin of 11  Review of Systems:    Review of Systems   All other systems reviewed and are negative  Past Medical and Surgical History:     Past Medical History:   Diagnosis Date    Anxiety     Hypertension     Venous stasis ulcers of both lower extremities ([facility] [address] )        History reviewed  No pertinent surgical history  Meds/Allergies:    Prior to Admission medications    Medication Sig Start Date End Date Taking?  Authorizing Provider   acetaminophen-codeine (TYLENOL/CODEINE #3) 300-30 MG per tablet Take 1 tablet by mouth every 12 (twelve) hours as needed 6/1/21 6/8/21 Yes Historical Provider, MD   cephalexin (KEFLEX) 500 mg capsule Take 500 mg by mouth 4 (four) times a day 6/1/21 6/15/21 Yes Historical Provider, MD fexofenadine (ALLEGRA) 180 MG tablet Take 1 tablet by mouth daily   Yes Historical Provider, MD   lisinopril (ZESTRIL) 20 mg tablet  3/11/21  Yes Historical Provider, MD   metoprolol succinate (TOPROL-XL) 100 mg 24 hr tablet  3/11/21  Yes Historical Provider, MD   naproxen sodium (ALEVE) 220 MG tablet Take 1 tablet by mouth every 8 (eight) hours   Yes Historical Provider, MD   Santyl ointment  5/26/21  Yes Historical Provider, MD   sertraline (ZOLOFT) 100 mg tablet  3/11/21  Yes Historical Provider, MD     I have reviewed home medications with patient personally  Allergies: No Known Allergies    Social History:     Marital Status: /Civil Union   Substance Use History:   Social History     Substance and Sexual Activity   Alcohol Use Yes    Alcohol/week: 5 0 standard drinks    Types: 3 Cans of beer, 2 Shots of liquor per week    Frequency: 4 or more times a week    Drinks per session: 3 or 4    Binge frequency: Never     Social History     Tobacco Use   Smoking Status Never Smoker   Smokeless Tobacco Never Used     Social History     Substance and Sexual Activity   Drug Use Never       Family History:    History reviewed  No pertinent family history  Physical Exam:     Vitals:   Blood Pressure: 125/80 (06/05/21 2000)  Pulse: 74 (06/05/21 2000)  Temperature: 98 3 °F (36 8 °C) (06/05/21 1550)  Temp Source: Oral (06/05/21 1550)  Respirations: 17 (06/05/21 2000)  Height: 5' 10" (177 8 cm) (06/05/21 1550)  Weight - Scale: (!) 174 kg (382 lb 8 oz) (06/05/21 1550)  SpO2: 96 % (06/05/21 2000)    Physical Exam  Vitals signs and nursing note reviewed  Constitutional:       Appearance: Normal appearance  He is obese  Comments: Middle-age male in bed, awake   HENT:      Head: Normocephalic and atraumatic  Right Ear: External ear normal       Left Ear: External ear normal       Nose: Nose normal  No congestion or rhinorrhea        Mouth/Throat:      Mouth: Mucous membranes are moist       Pharynx: Oropharynx is clear  No oropharyngeal exudate or posterior oropharyngeal erythema  Eyes:      General: No scleral icterus  Right eye: No discharge  Left eye: No discharge  Pupils: Pupils are equal, round, and reactive to light  Neck:      Musculoskeletal: Normal range of motion  No neck rigidity or muscular tenderness  Vascular: No carotid bruit  Cardiovascular:      Rate and Rhythm: Normal rate and regular rhythm  Pulses: Normal pulses  Heart sounds: No murmur  No friction rub  No gallop  Pulmonary:      Effort: Pulmonary effort is normal  No respiratory distress  Breath sounds: Normal breath sounds  No stridor  No wheezing, rhonchi or rales  Abdominal:      General: Abdomen is flat  Bowel sounds are normal  There is no distension  Palpations: Abdomen is soft  There is no mass  Tenderness: There is no abdominal tenderness  There is no guarding or rebound  Hernia: No hernia is present  Musculoskeletal: Normal range of motion  General: No swelling, tenderness, deformity or signs of injury  Lymphadenopathy:      Cervical: No cervical adenopathy  Skin:     General: Skin is warm and dry  Capillary Refill: Capillary refill takes less than 2 seconds  Coloration: Skin is not jaundiced or pale  Findings: Erythema present  No bruising  Comments: There is a wound noted in the lower aspect of the ankle and anteriorly in LLE with serous drainage, erythema noted   Neurological:      General: No focal deficit present  Mental Status: He is alert and oriented to person, place, and time  Mental status is at baseline  Cranial Nerves: No cranial nerve deficit  Sensory: No sensory deficit  Motor: No weakness  Coordination: Coordination normal       Deep Tendon Reflexes: Reflexes normal    Psychiatric:         Mood and Affect: Mood normal          Behavior: Behavior normal          Thought Content:  Thought content normal          Judgment: Judgment normal              Additional Data:     Lab Results: I have personally reviewed pertinent reports  Results from last 7 days   Lab Units 06/05/21  1626   WBC Thousand/uL 8 61   HEMOGLOBIN g/dL 11 1*   HEMATOCRIT % 35 9*   PLATELETS Thousands/uL 297   NEUTROS PCT % 71   LYMPHS PCT % 15   MONOS PCT % 8   EOS PCT % 5     Results from last 7 days   Lab Units 06/05/21  1626   SODIUM mmol/L 141   POTASSIUM mmol/L 4 6   CHLORIDE mmol/L 106   CO2 mmol/L 25   BUN mg/dL 24   CREATININE mg/dL 1 25   ANION GAP mmol/L 10   CALCIUM mg/dL 8 2*   ALBUMIN g/dL 3 0*   TOTAL BILIRUBIN mg/dL 0 71   ALK PHOS U/L 72   ALT U/L 37   AST U/L 26   GLUCOSE RANDOM mg/dL 137     Results from last 7 days   Lab Units 06/05/21  1626   INR  1 19                   Imaging: I have personally reviewed pertinent reports  CT lower extremity w contrast left   Final Result by Rena De La Rosa MD (06/05 1948)      Pretibial wound without superficial collections or subjacent evidence for osteomyelitis  Workstation performed: [id number]               [facility] Reviewed: Yes     ** Please Note: This note has been constructed using a voice recognition system   **

## 2021-06-06 NOTE — ASSESSMENT & PLAN NOTE
Patient does have LLE wound which is a Chronic problem which seems to be acutely worsening as evident by worsening pain and increased discharge concern for infection  Continue broad-spectrum antibiotic coverage with Unasyn and Vancomycin, check MRSA culture, attempt to obtain wound cultures      Patient's left lower extremity has significantly increased warmth compared to right leg on palpation  Case discussed with vascular surgery  Consult wound care

## 2021-06-06 NOTE — PROGRESS NOTES
Vancomycin Assessment    Pricilla Khan is a 62 y o  male who is currently receiving vancomycin 2500 mg iv q 12 hours for skin-soft tissue infection     Relevant clinical data and objective history reviewed:  Creatinine   Date Value Ref Range Status   06/05/2021 1 25 0 60 - 1 30 mg/dL Final     Comment:     Standardized to IDMS reference method     /80 (BP Location: Right arm)   Pulse 74   Temp 98 3 °F (36 8 °C) (Oral)   Resp 17   Ht 5' 10" (1 778 m)   Wt (!) 174 kg (382 lb 8 oz)   SpO2 96%   BMI 54 88 kg/m²   No intake/output data recorded  Lab Results   Component Value Date/Time    BUN 24 06/05/2021 04:26 PM    WBC 8 61 06/05/2021 04:26 PM    HGB 11 1 (L) 06/05/2021 04:26 PM    HCT 35 9 (L) 06/05/2021 04:26 PM    MCV 85 06/05/2021 04:26 PM     06/05/2021 04:26 PM     Temp Readings from Last 3 Encounters:   06/05/21 98 3 °F (36 8 °C) (Oral)     Vancomycin Days of Therapy: 1    Assessment/Plan  The patient is currently on vancomycin utilizing scheduled dosing based on adjusted body weight (due to obesity)  Baseline risks associated with therapy include: concomitant nephrotoxic medications, advanced age, and dehydration  The patient is currently receiving 2500 mg iv q 12 hours and after clinical evaluation will be changed to 2000 mg iv q 12 hours  Pharmacy will also follow closely for s/sx of nephrotoxicity, infusion reactions, and appropriateness of therapy  BMP and CBC will be ordered per protocol  Plan for trough as patient approaches steady state, prior to the 4th  dose at approximately 10:30 on 6/07/2021  Due to infection severity, will target a trough of 15-20 (appropriate for most indications)   Pharmacy will continue to follow the patients culture results and clinical progress daily      Taiwo Carter, Pharmacist

## 2021-06-06 NOTE — ASSESSMENT & PLAN NOTE
-spontaneous bleed from superficial Left lateral malleolar varicosity  -controlled w/direct manual pressure and local tourniquet prior to arrival ER  Site clear without recurrent bleed  -H/H stable    No transfusion required  -1st episode of varicosity bleeding  -compression and elevation LLE for edema control  -see plan as outlined above

## 2021-06-07 ENCOUNTER — TELEPHONE (OUTPATIENT)
Dept: ADMINISTRATIVE | Facility: HOSPITAL | Age: 61
End: 2021-06-07

## 2021-06-07 VITALS
HEIGHT: 70 IN | WEIGHT: 315 LBS | OXYGEN SATURATION: 96 % | SYSTOLIC BLOOD PRESSURE: 165 MMHG | DIASTOLIC BLOOD PRESSURE: 103 MMHG | TEMPERATURE: 98.1 F | BODY MASS INDEX: 45.1 KG/M2 | RESPIRATION RATE: 19 BRPM | HEART RATE: 79 BPM

## 2021-06-07 DIAGNOSIS — I83.029 VENOUS ULCER OF LEFT LOWER EXTREMITY WITH VARICOSE VEINS (HCC): Primary | ICD-10-CM

## 2021-06-07 DIAGNOSIS — L97.929 VENOUS ULCER OF LEFT LOWER EXTREMITY WITH VARICOSE VEINS (HCC): Primary | ICD-10-CM

## 2021-06-07 LAB
ALBUMIN SERPL BCP-MCNC: 2.8 G/DL (ref 3.5–5)
ALP SERPL-CCNC: 72 U/L (ref 46–116)
ALT SERPL W P-5'-P-CCNC: 35 U/L (ref 12–78)
ANION GAP SERPL CALCULATED.3IONS-SCNC: 7 MMOL/L (ref 4–13)
AST SERPL W P-5'-P-CCNC: 18 U/L (ref 5–45)
BASOPHILS # BLD AUTO: 0.03 THOUSANDS/ΜL (ref 0–0.1)
BASOPHILS NFR BLD AUTO: 0 % (ref 0–1)
BILIRUB SERPL-MCNC: 0.28 MG/DL (ref 0.2–1)
BUN SERPL-MCNC: 13 MG/DL (ref 5–25)
CALCIUM ALBUM COR SERPL-MCNC: 9.4 MG/DL (ref 8.3–10.1)
CALCIUM SERPL-MCNC: 8.4 MG/DL (ref 8.3–10.1)
CHLORIDE SERPL-SCNC: 106 MMOL/L (ref 100–108)
CO2 SERPL-SCNC: 28 MMOL/L (ref 21–32)
CREAT SERPL-MCNC: 1.07 MG/DL (ref 0.6–1.3)
EOSINOPHIL # BLD AUTO: 0.4 THOUSAND/ΜL (ref 0–0.61)
EOSINOPHIL NFR BLD AUTO: 6 % (ref 0–6)
ERYTHROCYTE [DISTWIDTH] IN BLOOD BY AUTOMATED COUNT: 14.3 % (ref 11.6–15.1)
GFR SERPL CREATININE-BSD FRML MDRD: 76 ML/MIN/1.73SQ M
GLUCOSE SERPL-MCNC: 104 MG/DL (ref 65–140)
HCT VFR BLD AUTO: 35.3 % (ref 36.5–49.3)
HGB BLD-MCNC: 10.7 G/DL (ref 12–17)
IMM GRANULOCYTES # BLD AUTO: 0.07 THOUSAND/UL (ref 0–0.2)
IMM GRANULOCYTES NFR BLD AUTO: 1 % (ref 0–2)
INR PPP: 1.14 (ref 0.84–1.19)
LYMPHOCYTES # BLD AUTO: 1.63 THOUSANDS/ΜL (ref 0.6–4.47)
LYMPHOCYTES NFR BLD AUTO: 23 % (ref 14–44)
MAGNESIUM SERPL-MCNC: 2.1 MG/DL (ref 1.6–2.6)
MCH RBC QN AUTO: 26.4 PG (ref 26.8–34.3)
MCHC RBC AUTO-ENTMCNC: 30.3 G/DL (ref 31.4–37.4)
MCV RBC AUTO: 87 FL (ref 82–98)
MONOCYTES # BLD AUTO: 0.64 THOUSAND/ΜL (ref 0.17–1.22)
MONOCYTES NFR BLD AUTO: 9 % (ref 4–12)
NEUTROPHILS # BLD AUTO: 4.46 THOUSANDS/ΜL (ref 1.85–7.62)
NEUTS SEG NFR BLD AUTO: 61 % (ref 43–75)
NRBC BLD AUTO-RTO: 0 /100 WBCS
PHOSPHATE SERPL-MCNC: 4.2 MG/DL (ref 2.7–4.5)
PLATELET # BLD AUTO: 219 THOUSANDS/UL (ref 149–390)
PMV BLD AUTO: 9.3 FL (ref 8.9–12.7)
POTASSIUM SERPL-SCNC: 4.3 MMOL/L (ref 3.5–5.3)
PROCALCITONIN SERPL-MCNC: <0.05 NG/ML
PROT SERPL-MCNC: 6.3 G/DL (ref 6.4–8.2)
PROTHROMBIN TIME: 14.1 SECONDS (ref 11.6–14.5)
RBC # BLD AUTO: 4.06 MILLION/UL (ref 3.88–5.62)
SODIUM SERPL-SCNC: 141 MMOL/L (ref 136–145)
VANCOMYCIN TROUGH SERPL-MCNC: 11.6 UG/ML (ref 10–20)
WBC # BLD AUTO: 7.23 THOUSAND/UL (ref 4.31–10.16)

## 2021-06-07 PROCEDURE — 84100 ASSAY OF PHOSPHORUS: CPT | Performed by: INTERNAL MEDICINE

## 2021-06-07 PROCEDURE — 84145 PROCALCITONIN (PCT): CPT | Performed by: INTERNAL MEDICINE

## 2021-06-07 PROCEDURE — 80202 ASSAY OF VANCOMYCIN: CPT | Performed by: INTERNAL MEDICINE

## 2021-06-07 PROCEDURE — 85610 PROTHROMBIN TIME: CPT | Performed by: INTERNAL MEDICINE

## 2021-06-07 PROCEDURE — 83735 ASSAY OF MAGNESIUM: CPT | Performed by: INTERNAL MEDICINE

## 2021-06-07 PROCEDURE — 80053 COMPREHEN METABOLIC PANEL: CPT | Performed by: INTERNAL MEDICINE

## 2021-06-07 PROCEDURE — 99239 HOSP IP/OBS DSCHRG MGMT >30: CPT | Performed by: FAMILY MEDICINE

## 2021-06-07 PROCEDURE — 85025 COMPLETE CBC W/AUTO DIFF WBC: CPT | Performed by: INTERNAL MEDICINE

## 2021-06-07 RX ORDER — AMOXICILLIN AND CLAVULANATE POTASSIUM 875; 125 MG/1; MG/1
1 TABLET, FILM COATED ORAL EVERY 12 HOURS SCHEDULED
Qty: 10 TABLET | Refills: 0 | Status: SHIPPED | OUTPATIENT
Start: 2021-06-07 | End: 2021-06-12

## 2021-06-07 RX ORDER — HYDROMORPHONE HCL/PF 1 MG/ML
1 SYRINGE (ML) INJECTION ONCE
Status: COMPLETED | OUTPATIENT
Start: 2021-06-07 | End: 2021-06-07

## 2021-06-07 RX ORDER — SACCHAROMYCES BOULARDII 250 MG
250 CAPSULE ORAL 2 TIMES DAILY
Qty: 10 CAPSULE | Refills: 0 | Status: SHIPPED | OUTPATIENT
Start: 2021-06-07 | End: 2021-06-12

## 2021-06-07 RX ORDER — OXYCODONE HYDROCHLORIDE 5 MG/1
5 TABLET ORAL EVERY 4 HOURS PRN
Qty: 20 TABLET | Refills: 0 | Status: SHIPPED | OUTPATIENT
Start: 2021-06-07 | End: 2021-06-18 | Stop reason: SDUPTHER

## 2021-06-07 RX ORDER — LIDOCAINE HYDROCHLORIDE 20 MG/ML
JELLY TOPICAL ONCE
Status: COMPLETED | OUTPATIENT
Start: 2021-06-07 | End: 2021-06-07

## 2021-06-07 RX ADMIN — AMPICILLIN SODIUM AND SULBACTAM SODIUM 3 G: 10; 5 INJECTION, POWDER, FOR SOLUTION INTRAVENOUS at 09:04

## 2021-06-07 RX ADMIN — AMPICILLIN SODIUM AND SULBACTAM SODIUM 3 G: 10; 5 INJECTION, POWDER, FOR SOLUTION INTRAVENOUS at 03:32

## 2021-06-07 RX ADMIN — OXYCODONE HYDROCHLORIDE 7.5 MG: 5 TABLET ORAL at 08:56

## 2021-06-07 RX ADMIN — VANCOMYCIN HYDROCHLORIDE 2000 MG: 1 INJECTION, POWDER, LYOPHILIZED, FOR SOLUTION INTRAVENOUS at 11:12

## 2021-06-07 RX ADMIN — LIDOCAINE HYDROCHLORIDE: 20 JELLY TOPICAL at 09:51

## 2021-06-07 RX ADMIN — LORATADINE 10 MG: 10 TABLET ORAL at 08:47

## 2021-06-07 RX ADMIN — HYDROMORPHONE HYDROCHLORIDE 1 MG: 1 INJECTION, SOLUTION INTRAMUSCULAR; INTRAVENOUS; SUBCUTANEOUS at 09:56

## 2021-06-07 RX ADMIN — ENOXAPARIN SODIUM 40 MG: 40 INJECTION SUBCUTANEOUS at 08:47

## 2021-06-07 RX ADMIN — METOPROLOL SUCCINATE 100 MG: 100 TABLET, EXTENDED RELEASE ORAL at 08:47

## 2021-06-07 RX ADMIN — SERTRALINE 25 MG: 25 TABLET, FILM COATED ORAL at 08:47

## 2021-06-07 NOTE — PLAN OF CARE
Problem: Potential for Falls  Goal: Patient will remain free of falls  Description: INTERVENTIONS:  - Assess patient frequently for physical needs  -  Identify cognitive and physical deficits and behaviors that affect risk of falls  -  [place] fall precautions as indicated by assessment   - Educate patient/family on patient safety including physical limitations  - Instruct patient to call for assistance with activity based on assessment  - Modify environment to reduce risk of injury  - Consider OT/PT consult to assist with strengthening/mobility  Outcome: Progressing     Problem: Nutrition/Hydration-ADULT  Goal: Nutrient/Hydration intake appropriate for improving, restoring or maintaining nutritional needs  Description: Monitor and assess patient's nutrition/hydration status for malnutrition  Collaborate with interdisciplinary team and initiate plan and interventions as ordered  Monitor patient's weight and dietary intake as ordered or per policy  Utilize nutrition screening tool and intervene as necessary  Determine patient's food preferences and provide high-protein, high-caloric foods as appropriate       INTERVENTIONS:  - Monitor oral intake, urinary output, labs, and treatment plans  - Assess nutrition and hydration status and recommend course of action  - Evaluate amount of meals eaten  - Assist patient with eating if necessary   - Allow adequate time for meals  - Recommend/ encourage appropriate diets, oral nutritional supplements, and vitamin/mineral supplements  - Order, calculate, and assess calorie counts as needed  - Recommend, monitor, and adjust tube feedings and TPN/PPN based on assessed needs  - Assess need for intravenous fluids  - Provide specific nutrition/hydration education as appropriate  - Include patient/family/caregiver in decisions related to nutrition  Outcome: Progressing

## 2021-06-07 NOTE — PLAN OF CARE
Problem: Potential for Falls  Goal: Patient will remain free of falls  Description: INTERVENTIONS:  - Assess patient frequently for physical needs  -  Identify cognitive and physical deficits and behaviors that affect risk of falls  -  [place] fall precautions as indicated by assessment   - Educate patient/family on patient safety including physical limitations  - Instruct patient to call for assistance with activity based on assessment  - Modify environment to reduce risk of injury  - Consider OT/PT consult to assist with strengthening/mobility  Outcome: Progressing     Problem: Nutrition/Hydration-ADULT  Goal: Nutrient/Hydration intake appropriate for improving, restoring or maintaining nutritional needs  Description: Monitor and assess patient's nutrition/hydration status for malnutrition  Collaborate with interdisciplinary team and initiate plan and interventions as ordered  Monitor patient's weight and dietary intake as ordered or per policy  Utilize nutrition screening tool and intervene as necessary  Determine patient's food preferences and provide high-protein, high-caloric foods as appropriate       INTERVENTIONS:  - Monitor oral intake, urinary output, labs, and treatment plans  - Assess nutrition and hydration status and recommend course of action  - Evaluate amount of meals eaten  - Assist patient with eating if necessary   - Allow adequate time for meals  - Recommend/ encourage appropriate diets, oral nutritional supplements, and vitamin/mineral supplements  - Order, calculate, and assess calorie counts as needed  - Recommend, monitor, and adjust tube feedings and TPN/PPN based on assessed needs  - Assess need for intravenous fluids  - Provide specific nutrition/hydration education as appropriate  - Include patient/family/caregiver in decisions related to nutrition  Outcome: Progressing     Problem: PAIN - ADULT  Goal: Verbalizes/displays adequate comfort level or baseline comfort level  Description: Interventions:  - Encourage patient to monitor pain and request assistance  - Assess pain using appropriate pain scale  - Administer analgesics based on type and severity of pain and evaluate response  - Implement non-pharmacological measures as appropriate and evaluate response  - Consider cultural and social influences on pain and pain management  - Notify physician/advanced practitioner if interventions unsuccessful or patient reports new pain  Outcome: Progressing     Problem: INFECTION - ADULT  Goal: Absence or prevention of progression during hospitalization  Description: INTERVENTIONS:  - Assess and monitor for signs and symptoms of infection  - Monitor lab/diagnostic results  - Monitor all insertion sites, i e  indwelling lines, tubes, and drains  - Monitor endotracheal if appropriate and nasal secretions for changes in amount and color  - [place] appropriate cooling/warming therapies per order  - Administer medications as ordered  - Instruct and encourage patient and family to use good hand hygiene technique  - Identify and instruct in appropriate isolation precautions for identified infection/condition  Outcome: Progressing  Goal: Absence of fever/infection during neutropenic period  Description: INTERVENTIONS:  - Monitor WBC    Outcome: Progressing     Problem: DISCHARGE PLANNING  Goal: Discharge to home or other facility with appropriate resources  Description: INTERVENTIONS:  - Identify barriers to discharge w/patient and caregiver  - Arrange for needed discharge resources and transportation as appropriate  - Identify discharge learning needs (meds, wound care, etc )  - Arrange for interpretive services to assist at discharge as needed  - Refer to Case Management Department for coordinating discharge planning if the patient needs post-hospital services based on physician/advanced practitioner order or complex needs related to functional status, cognitive ability, or social support system  Outcome: Progressing     Problem: Knowledge Deficit  Goal: Patient/family/caregiver demonstrates understanding of disease process, treatment plan, medications, and discharge instructions  Description: Complete learning assessment and assess knowledge base    Interventions:  - Provide teaching at level of understanding  - Provide teaching via preferred learning methods  Outcome: Progressing

## 2021-06-07 NOTE — TELEPHONE ENCOUNTER
Ralph 6/25 at 2pm in [facility] 6/28 at 9:30 with [facility] in [place]       From: [place],  [address] <[email or web address]   Sent: Sunday, June 6, 2021 8:29 PM  To: Vascular Scheduling [email or web address]  Cc: Cholo Ayala <Tamela  [email or web address]  Subject: outpt f/u with surgeon w/[facility] Rosariocathie Andrade (Legal)                    62 y o , 1960                  Legal sex: Male                  Marital Status: /Civil Union                  EmployStephen Thomas                  MRN: [id number]                  CSN: [id number]                  RUDY: [id number]                                    Room and Bed: MO MS4 19, -01                  [address] [place] [address]                  [phone] (Mobile)    [phone] (Home Phone)    Hi  This is an inpatient at [facility] [place] seen in consult over weekend  Dx:  recurrent nonhealing LLE venous ulcers w/chronic venous insufficiency and venous hypertension  Please schedule for outpt f/u with surgeon with [facility] to discuss possible venous intervention  Thanks          Kelby Meckel, PA-C  [facility]  Ph:  [phone]  Fax:  [phone]

## 2021-06-07 NOTE — DISCHARGE SUMMARY
[facility]  Discharge- Jem Mayer 1960, 62 y o  male MRN: [id number]  Unit/Bed#: -01 Encounter: [id number]  Primary Care Provider: No primary care provider on file  Date and time admitted to hospital: 6/5/2021  3:46 PM    * Venous ulcer of left lower extremity with varicose veins ([address] )  Assessment & Plan  Patient does have LLE wound which is a Chronic problem which seems to be acutely worsening as evident by worsening pain and increased discharge & concern for infection on admission    · CT left lower extremity consistent with pretibial wound but no evidence of osteomyelitis  · Patient received IV Unasyn/vancomycin during hospitalization which is being switched to oral Augmentin at discharge  · Seen by vascular surgery and they recommended outpatient follow-up with plan for venous Doppler and intervention as needed for improved wound healing  Continue with elevation/compression/local wound care  · Wound care recommendations in place  · Patient intends to follow up with the [address] [place] [address] going forward  Phone number provided  · P r n  Oxycodone (20 tablets) prescribed at discharge to help with wound dressing changes  · Home with home health  Chronic venous hypertension (idiopathic) with ulcer and inflammation of left lower extremity (CODE) (HCC)  Assessment & Plan  See plan of care above    Essential hypertension  Assessment & Plan  Continue metoprolol succinate/ lisinopril    Obesity  Assessment & Plan  Patient would benefit from weight loss after resolution of acute illness  BMI noted of 54  Potential candidate for bariatric surgery as outpatient     Bleeding from varicose vein  Assessment & Plan  Presented with bleeding varicose veins, currently stopped  Outpatient follow-up with vascular surgery, no further inpatient management required, bleeding varicose vein was distal to current chronic wound      Acute blood loss anemia  Assessment & Plan  In the setting of bleeding veins noted to be anemic  Now bleeding controlled  Hb stable at 10 7    Discharging Physician / Practitioner: Manuel Alvarado MD  PCP: No primary care provider on file  Admission Date:   Admission Orders (From admission, onward)     Ordered        06/05/21 2043  Inpatient Admission  Once                   Discharge Date: 06/07/21    Disposition:      Home with home health    Reason for Admission: lle wound with surrounding redness/ pain    Discharge Diagnoses:     Please see assessment and plan section above for further details regarding discharge diagnoses  Resolved Problems  Date Reviewed: 6/6/2021    None          Consultations During Hospital Stay:  · Vascular surgery  · Wound care    Procedures Performed:   · none    Medication Adjustments and Discharge Medications:  · Summary of Medication Adjustments made as a result of this hospitalization: see med rec  · Medication Dosing Tapers - Please refer to Discharge Medication List for details on any medication dosing tapers (if applicable to patient)  · Medications being temporarily held (include recommended restart time): see med rec  · Discharge Medication List: See after visit summary for reconciled discharge medications  Wound Care Recommendations:  When applicable, please see wound care section of After Visit Summary  Diet Recommendations at Discharge:  Diet -        Diet Orders   (From admission, onward)             Start     Ordered    06/06/21 0101  Diet Regular; Regular House  Diet effective now     Question Answer Comment   Diet Type Regular    Regular Regular House    RD to adjust diet per protocol? Yes        06/06/21 0101              Hospital Course:     Devan Lion is a 62 y o  male patient who originally presented to the hospital on 6/5/2021 due to complaints of left lower extremity wound with surrounding erythema pain and drainage    Patient had been following with [address] at [facility]  In the last 1 week prior to presentation he had worsening pain and increased discharge from the wound  He also noted episodes of bleeding wanes around the [place] prior to coming into the hospital     · CT left lower extremity was negative for osteomyelitis  It was consistent with the pretibial wound  · Patient was seen by vascular surgery, they recommended supportive care with Ace wrap compression/elevation/local wound care  They recommended outpatient follow-up with venous study to assess extent of deep/superficial venous insufficiency and discuss potential options for intervention to help with wound healing  · Patient was also seen by wound care and they recommended wound dressing changes  · Initially patient treated with IV Unasyn/vancomycin, no significant erythema around the wound but findings of chronic venous stasis  Will discharge on oral Augmentin  · Patient also complaining of significant pain associated with wound dressing changes hence being prescribed 20 tablets of oral oxycodone on discharge  · Outpatient follow-up with vascular surgery and PCP recommended  Condition at Discharge: good     Discharge Day Visit / Exam:     Vitals: Blood Pressure: (!) 165/103 (06/07/21 0836)  Pulse: 79 (06/07/21 0836)  Temperature: 98 1 °F (36 7 °C) (06/07/21 0836)  Temp Source: Oral (06/05/21 1550)  Respirations: 19 (06/07/21 0836)  Height: 5' 10" (177 8 cm) (06/05/21 1550)  Weight - Scale: (!) 174 kg (382 lb 8 oz) (06/05/21 1550)  SpO2: 96 % (06/07/21 0836)  Exam:   Physical Exam  Constitutional:       General: He is not in acute distress  Appearance: He is obese  He is not toxic-appearing  HENT:      Mouth/Throat:      Mouth: Mucous membranes are moist       Pharynx: Oropharynx is clear  Cardiovascular:      Rate and Rhythm: Normal rate and regular rhythm  Pulmonary:      Effort: Pulmonary effort is normal       Breath sounds: Normal breath sounds     Skin:     Comments: Left lower extremity, mid shin ulcer noted with certain areas noting serous drainage  Surrounding chronic venous stasis but no erythema concerning for cellulitis  Neurological:      Mental Status: He is alert  Goals of Care Discussions:  · Code Status at Discharge: Level 1 - Full Code    Discharge instructions/Information to patient and family:   See after visit summary section titled Discharge Instructions for information provided to patient and family  Discharge Statement:  I spent 40 minutes discharging the patient  This time was spent on the day of discharge  I had direct contact with the patient on the day of discharge  Greater than 50% of the total time was spent examining patient, answering all patient questions, arranging and discussing plan of care with patient as well as directly providing post-discharge instructions  Additional time then spent on discharge activities      ** Please Note: This note has been constructed using a voice recognition system **

## 2021-06-07 NOTE — ASSESSMENT & PLAN NOTE
Patient does have LLE wound which is a Chronic problem which seems to be acutely worsening as evident by worsening pain and increased discharge & concern for infection on admission    · CT left lower extremity consistent with pretibial wound but no evidence of osteomyelitis  · Patient received IV Unasyn/vancomycin during hospitalization which is being switched to oral Augmentin at discharge  · Seen by vascular surgery and they recommended outpatient follow-up with plan for venous Doppler and intervention as needed for improved wound healing  Continue with elevation/compression/local wound care  · Wound care recommendations in place  · Patient intends to follow up with the [address] [place] [address] going forward  Phone number provided  · P r n  Oxycodone (20 tablets) prescribed at discharge to help with wound dressing changes  · Home with home health

## 2021-06-07 NOTE — ASSESSMENT & PLAN NOTE
Presented with bleeding varicose veins, currently stopped  Outpatient follow-up with vascular surgery, no further inpatient management required, bleeding varicose vein was distal to current chronic wound

## 2021-06-07 NOTE — PLAN OF CARE
Problem: INFECTION - ADULT  Goal: Absence or prevention of progression during hospitalization  Description: INTERVENTIONS:  - Assess and monitor for signs and symptoms of infection  - Monitor lab/diagnostic results  - Monitor all insertion sites, i e  indwelling lines, tubes, and drains  - Monitor endotracheal if appropriate and nasal secretions for changes in amount and color  - Walsh appropriate cooling/warming therapies per order  - Administer medications as ordered  - Instruct and encourage patient and family to use good hand hygiene technique  - Identify and instruct in appropriate isolation precautions for identified infection/condition  Outcome: Progressing     Problem: PAIN - ADULT  Goal: Verbalizes/displays adequate comfort level or baseline comfort level  Description: Interventions:  - Encourage patient to monitor pain and request assistance  - Assess pain using appropriate pain scale  - Administer analgesics based on type and severity of pain and evaluate response  - Implement non-pharmacological measures as appropriate and evaluate response  - Consider cultural and social influences on pain and pain management  - Notify physician/advanced practitioner if interventions unsuccessful or patient reports new pain  Outcome: Progressing     Problem: DISCHARGE PLANNING  Goal: Discharge to home or other facility with appropriate resources  Description: INTERVENTIONS:  - Identify barriers to discharge w/patient and caregiver  - Arrange for needed discharge resources and transportation as appropriate  - Identify discharge learning needs (meds, wound care, etc )  - Arrange for interpretive services to assist at discharge as needed  - Refer to Case Management Department for coordinating discharge planning if the patient needs post-hospital services based on physician/advanced practitioner order or complex needs related to functional status, cognitive ability, or social support system  Outcome: Progressing Problem: Knowledge Deficit  Goal: Patient/family/caregiver demonstrates understanding of disease process, treatment plan, medications, and discharge instructions  Description: Complete learning assessment and assess knowledge base    Interventions:  - Provide teaching at level of understanding  - Provide teaching via preferred learning methods  Outcome: Progressing

## 2021-06-07 NOTE — DISCHARGE INSTR - OTHER ORDERS
Follow-up with [facility] wound care center as an outpatient- call [phone] for appt  1  Apply hydraguard to b/l heels, buttocks and sacrum BID and PRN for prevention and protection  2  Apply skin nourishing cream the entire skin daily for moisture  3  Turn and reposition patient every  2 hours   4  Elevate heels off of bed with pillows to offload pressure   5  Apply EHOB waffle cushion to chair when OOB, if able  6  Cleanse LLE wounds with NSS, pat dry, and apply xeroform to the wounds beds as a contact layer  Top with silver alginate and cover with ABD pads  Secure the dressings with kerlex wrap  Apply ACE wrap from base of toes to knee gatch  Change daily and PRN for soilage/dislodgement  7  Elevate b/l lower extremities for edema management as patient tolerates  8  Follow-up with the [facility] wound care center as an outpatient - please call [phone] for an appointment     9  Lidocaine gel to wound as per primary team order

## 2021-06-07 NOTE — UTILIZATION REVIEW
Initial Clinical Review    Admission: Date/Time/Statement:   Admission Orders (From admission, onward)     Ordered        06/05/21 2043  Inpatient Admission  Once                   Orders Placed This Encounter   Procedures    Inpatient Admission     Standing Status:   Standing     Number of Occurrences:   1     Order Specific Question:   Level of Care     Answer:   Med Surg [16]     Order Specific Question:   Estimated length of stay     Answer:   More than 2 Midnights     Order Specific Question:   Certification     Answer:   I certify that inpatient services are medically necessary for this patient for a duration of greater than two midnights  See H&P and MD Progress Notes for additional information about the patient's course of treatment  ED Arrival Information     Expected Arrival Acuity Means of Arrival Escorted By Service Admission Type    6/5/2021 6/5/2021 15:45 Emergent Ambulance [facility] Emergency    Arrival Complaint    Bleeding from varicose veins of left lower extremity        Chief Complaint   Patient presents with    Skin Lesion w/Prolonged Bleeding     Patient has known ulcer on his left leg that he had checked at urgent care due to bleeding and they stated that he lost appoximately 1 L per EMS       Initial Presentation:  63 yo male to ED from home w/ LLE wound   Surrounding erythema pain and drainage   Chronic wound LLE   Followed at the wound care center  noted to have significant pooling of blood in the left leg because he believes that "a vein bursted", he went to urgent care 1st, they placed a tourniquet there, he subsequently came here and was no longer bleeding  Now the bleeding has stopped completely  Admitted IP status plan for vascular  Sx consult , consult wound care ,monitor temp and wbc  Cover w/ unasyn and vanco and f/u cx  6/6 Vascular Consult   Recommend supportive care with ACE wrap compression, elevation, local wound care    Cont local wound care ,cont abx 6/6 IM Note   intermittent severe throbbing pain left lower extremity, improved with p o  Oxycodone 7 5 mg x 1 today  Consult wound care , cont abx   ED Triage Vitals   Temperature Pulse Respirations Blood Pressure SpO2   06/05/21 1550 06/05/21 1550 06/05/21 1550 06/05/21 1550 06/05/21 1550   98 3 °F (36 8 °C) 76 18 134/77 99 %      Temp Source Heart Rate Source Patient Position - Orthostatic VS BP Location FiO2 (%)   06/05/21 1550 06/05/21 1550 06/05/21 1550 06/05/21 1550 --   Oral Monitor Lying Right arm       Pain Score       06/05/21 1729       6          Wt Readings from Last 1 Encounters:   06/05/21 (!) 174 kg (382 lb 8 oz)     Additional Vital Signs:   06/07/21 0919  --  --  --  --  --  --  None (Room air)  --   06/07/21 08:36:10  98 1 °F (36 7 °C)  79  19  165/103Abnormal   124  96 %  --  --   06/06/21 23:49:24  98 6 °F (37 °C)  82  17  139/87  104  93 %  --  --   06/06/21 16:29:54  98 °F (36 7 °C)  79  18  147/84  105  94 %  --  --   06/06/21 16:07:34  --  78  18  147/91  110  94 %  --  --   06/06/21 14:59:06  --  77  18  --  --  90 %  --  --   06/06/21 07:19:43  98 °F (36 7 °C)  72  18  132/84  100  95 %  --  --   06/05/21 23:27:32  98 1 °F (36 7 °C)  79  18  132/76  95  94 %  --  --   06/05/21 2200  --  --  --  --  --  --  None (Room air)  --   06/05/21 2000  --  74  17  125/80  --  96 %  None (Room air)  Lying   06/05/21 1730  --  81  16  134/81  --  96 %  None (Room air         Pertinent Labs/Diagnostic Test Results:   CT LLE w/contrast 6/5/2021:  No evidence of subcutaneous gas, abscess or osteomyelitis  Pretibial wound as noted  Significant superficial venous dilatation and varicose veins   Patent popliteal and tibial vessels w/3V runoff  Results from last 7 days   Lab Units 06/07/21  0347 06/06/21  0543 06/05/21  1626   WBC Thousand/uL 7 23 8 70 8 61   HEMOGLOBIN g/dL 10 7* 10 4* 11 1*   HEMATOCRIT % 35 3* 33 3* 35 9*   PLATELETS Thousands/uL 219 258 297   NEUTROS ABS Thousands/µL 4 46 6  62 6 22       Results from last 7 days   Lab Units 06/07/21  0348 06/06/21  0543 06/05/21  1626   SODIUM mmol/L 141 139 141   POTASSIUM mmol/L 4 3 4 4 4 6   CHLORIDE mmol/L 106 106 106   CO2 mmol/L 28 25 25   ANION GAP mmol/L 7 8 10   BUN mg/dL 13 18 24   CREATININE mg/dL 1 07 0 94 1 25   EGFR ml/min/1 73sq m 76 88 63   CALCIUM mg/dL 8 4 8 2* 8 2*   MAGNESIUM mg/dL 2 1  --   --    PHOSPHORUS mg/dL 4 2  --   --      Results from last 7 days   Lab Units 06/07/21  0348 06/06/21  0543 06/05/21  1626   AST U/L 18 15 26   ALT U/L 35 30 37   ALK PHOS U/L 72 62 72   TOTAL PROTEIN g/dL 6 3* 6 3* 6 4   ALBUMIN g/dL 2 8* 2 8* 3 0*   TOTAL BILIRUBIN mg/dL 0 28 0 41 0 71     Results from last 7 days   Lab Units 06/07/21  0348 06/06/21  0543 06/05/21  1626   GLUCOSE RANDOM mg/dL 104 117 137     Results from last 7 days   Lab Units 06/07/21  0348 06/05/21  1626   PROTIME seconds 14 1 14 5   INR  1 14 1 19   PTT seconds  --  25     Results from last 7 days   Lab Units 06/07/21  0348   PROCALCITONIN ng/ml <0 05     Results from last 7 days   Lab Units 06/06/21  0849   GRAM STAIN RESULT  No polys seen*  2+ Gram negative rods*  Rare Gram positive cocci in pairs*       ED Treatment:   Medication Administration from 06/05/2021 1527 to 06/05/2021 2134       Date/Time Order Dose Route Action     06/05/2021 1628 clindamycin (CLEOCIN) IVPB (premix in dextrose) 600 mg 50 mL 600 mg Intravenous New Bag     06/05/2021 1729 morphine (PF) 4 mg/mL injection 4 mg 4 mg Intravenous Given        Past Medical History:   Diagnosis Date    Anxiety     Hypertension     Venous stasis ulcers of both lower extremities ([address] )      Present on Admission:   Venous ulcer of left lower extremity with varicose veins (HCC)   Acute blood loss anemia   Bleeding from varicose vein   Obesity   Essential hypertension   Chronic venous hypertension (idiopathic) with ulcer and inflammation of left lower extremity (CODE) (HCC)      Admitting Diagnosis: Skin lesion [L98 9]  Cellulitis of left leg [L03 116]  Wound of left lower extremity, initial encounter [S89 872D]  Age/Sex: 62 y o  male  Admission Orders:  Scheduled Medications:  ampicillin-sulbactam, 3 g, Intravenous, Q6H  enoxaparin, 40 mg, Subcutaneous, Daily  loratadine, 10 mg, Oral, Daily  metoprolol succinate, 100 mg, Oral, Daily  sertraline, 25 mg, Oral, Daily  vancomycin, 2,000 mg, Intravenous, Q12H      Continuous IV Infusions:     PRN Meds:  acetaminophen, 650 mg, Oral, Q6H PRN  HYDROmorphone, 0 2 mg, Intravenous, Q4H PRN  ondansetron, 4 mg, Intravenous, Q6H PRN  oxyCODONE, 5 mg, Oral, Q4H PRN  oxyCODONE, 7 5 mg, Oral, Q4H PRN        IP CONSULT TO PHARMACY  IP CONSULT TO VASCULAR SURGERY    Network Utilization Review Department  ATTENTION: Please call with any questions or concerns to [phone] and carefully listen to the prompts so that you are directed to the right person  All voicemails are confidential   Sudie Crigler all requests for admission clinical reviews, approved or denied determinations and any other requests to dedicated fax number below belonging to the campus where the patient is receiving treatment   List of dedicated fax numbers for the Facilities:  [address] DENIALS (Administrative/Medical Necessity) [phone]   [address] (Maternity/NICU/Pediatrics) [phone]   [address] [place] [address] [address] [place] [address] [address] [phone]

## 2021-06-07 NOTE — WOUND OSTOMY CARE
Progress Note - Wound   Garcia Jennings 62 y o  male MRN: [id number]  Unit/Bed#: -01 Encounter: [id number]      Assessment:  Wound care consulted for assessment of LLE wound  Patient admitted with venous ulcer of the LLE  On IV ABT as per the primary team  Patient history of - obesity, anemia, anxiety, depression and venous stasis  Patient is alert and oriented x 4  Cooperative and agreeable for the assessment  Continent of bowel and bladder, and ambulatory per patient self report  Patient declined a full skin assessment  No other wounds or skin alterations reported by the primary RN  Primary RN at bedside during the assessment, and patient was pre-medicated for patient  The wounds were also visualized today during the assessment by the [facility] attending Dr Kelley Colon and is aware of the assessment findings and plan of care  Patient reports going to the [facility] wound care center- considering switching to [facility] center  Patient reports he has been using santyl and hydrofera blue to the wound at home, and his wife assists with the wound care at home  Vascular has also seen the patient during this admission  Patient reports history of LLE wounds for "a few years on and off"  Patient has not been wears his compression (tubi- at home) due to the increased edema and drainage  B/l heels are intact with no redness or wounds  RLE is intact with no redness, warmth or open wounds  Intact hemosiderin staining  Approx: 0930 -began assessment, obtained wound history, and took down outer dressing (ace and kerlex)  ABD noted to be covering what appeared to be dermagran on the wound bed  Dressings all well adhered  Copious amounts of NSS and hydrogel was used to soak the dressings to reduce pain and prevent wound bed trauma  Lidocaine gel was applied to the exposed wound edges and under the dressing edges per patient request  Patient request wound RN to return in 30 min      Approx: 1000 returned to remove ABD pad from the leg  Noted stretched out thin net-like layers of dermagran adhered to the wound bed remained  Patient tolerated ABD pad removal well but was painful  No bleeding noted, dressing was atraumatically removed from the tissues  One time per pain meds given by primary RN via IV  Applied more lidocaine gel and hydrogel over top of the adhered dermagran to reduce pain and prevent traumatic removal of dressings  Patient request wound RN to return in 30 min  Approx: 1030 returned to remove dressings  Patient agreeable  Used NSS and pick-ups to remove the dermagran gauze on the wound beds  Dressings released easily, but was painful for patient  No bleeding noted, dressing was atraumatically removed from the tissues  Pictures taken at that time after dressing removal was completed (time stamp below)  1  LLE- medial/lateral/anterior aspects - extensive large irregular scattered shaped full thickness venous stasis wounds  All aspects measured together  Wound beds are approx: 90% mixed moist adhered yellow/brown slough and 10% moist red/pink tissue  Edges fragile and attached, macerated, and rolled in some aspects noting the chronicity of the wounds  Hui-wounds are intact with hemosiderin staining  Foul odor noted to the wounds which decreased after cleansing  Green hue noted to the drainage on the old dressing  Edema noted to LLE +1   -will recommend daily dressing using silver alginate and ABD pads to manage the drainage, using xeroform sheets (due to large areas as a contact layer)  Patient desires ACE wrap for comfort as he states this decreases the pain  Patient agreeable to this plan of care as he has used these dressings in past and is aware that hydrofera blue is not on formulary  Suggested patient may bring in his own dressings from home if he desires  Once drainage decreases would recommend to restart santyl       Educated patient on the importance of frequent offloading of pressure via turning, repositioning and weight-shifting  Verbalized understanding of plan of care  No induration, fluctuance, warmth, redness, or purulence noted to the above noted wounds  New dressings applied  Patient tolerated well- patient very appreciative of dressing change, states, "it feels so much better now with the new dressing"  Primary nurse aware of plan of care  See flow sheets for more detailed assessment findings  Will follow along  Plan:   1  Apply hydraguard to b/l heels, buttocks and sacrum BID and PRN for prevention and protection  2  Apply skin nourishing cream the entire skin daily for moisture  3  Turn and reposition patient every  2 hours   4  Elevate heels off of bed with pillows to offload pressure   5  Apply EHOB waffle cushion to chair when OOB, if able  6  Cleanse LLE wounds with NSS, pat dry, and apply xeroform to the wounds beds as a contact layer  Top with silver alginate and cover with ABD pads  Secure the dressings with kerlex wrap  Apply ACE wrap from base of toes to knee gatch  Change daily and PRN for soilage/dislodgement  7  Elevate b/l lower extremities for edema management as patient tolerates  8  Follow-up with the [facility] wound care center as an outpatient - please call [phone] for an appointment  9  Wound care will follow along with patient weekly, please call with questions and concerns  10  Lidocaine gel to wound as per primary team order    Objective:    Vitals: Blood pressure (!) 165/103, pulse 79, temperature 98 1 °F (36 7 °C), resp  rate 19, height 5' 10" (1 778 m), weight (!) 174 kg (382 lb 8 oz), SpO2 96 %  ,Body mass index is 54 88 kg/m²  Wound 06/07/21 Pretibial Left (Active)   Wound Image    06/07/21 1055   Wound Description Brown;Beefy red;Yellow;Slough;Pink 06/07/21 1055   Hui-wound Assessment Intact;Fragile; Maceration;Yellow-brown 06/07/21 1055   Wound Length (cm) 14 2 cm 06/07/21 1055   Wound Width (cm) 24 cm 06/07/21 1055 Wound Depth (cm) 0 3 cm 06/07/21 1055   Wound Surface Area (cm^2) 340 8 cm^2 06/07/21 1055   Wound Volume (cm^3) 102 24 cm^3 06/07/21 1055   Calculated Wound Volume (cm^3) 102 24 cm^3 06/07/21 1055   Tunneling 0 cm 06/07/21 1055   Tunneling in depth located at 0 06/07/21 1055   Undermining 0 06/07/21 1055   Undermining is depth extending from 0 06/07/21 1055   Drainage Amount Large 06/07/21 1055   Drainage Description Foul smelling;Yellow;Green;Serosanguineous; Thin 06/07/21 1055   Non-staged Wound Description Full thickness 06/07/21 1055   Treatments Cleansed;Irrigation with NSS;Site care;Elevated 06/07/21 1055   Dressing Calcium Alginate with Silver;ABD;Xeroform 06/07/21 1055   Wound packed? No 06/07/21 1055   Packing- # removed 0 06/07/21 1055   Packing- # inserted 0 06/07/21 1055   Dressing Changed New 06/07/21 1055   Patient Tolerance Tolerated well 06/07/21 1055   Dressing Status Dry; Intact; Clean 06/07/21 1055       Recommendations written as orders  AVS updated    Manny Turner RN BSN CWON

## 2021-06-07 NOTE — UTILIZATION REVIEW
Inpatient Admission Authorization Request   NOTIFICATION OF INPATIENT ADMISSION/INPATIENT AUTHORIZATION REQUEST   SERVICING FACILITY:   [address] [place] [address]  Tax ID: [id number]  NPI: [id number]  Place of Service: Inpatient [facility]  [address]  Place of Service Code: 24     ATTENDING PROVIDER:  Attending Name and NPI#: Marshal Sue [[id number]]  Address: [address] [place] [address]  Phone: [phone]     UTILIZATION REVIEW CONTACT:  [facility] Utilization   Network Utilization Review Department  Phone: [phone]  Fax [phone]  Email: [email or web address]     PHYSICIAN ADVISORY SERVICES:  FOR WOLL-ET-MGOD REVIEW - MEDICAL NECESSITY DENIAL  Phone: [phone]  Fax: [phone]  Email: [email or web address]     TYPE OF REQUEST:  Inpatient Status     ADMISSION INFORMATION:  ADMISSION DATE/TIME: 6/5/21 2043  PATIENT DIAGNOSIS CODE/DESCRIPTION:  Skin lesion [L98 9]  Cellulitis of left leg [L03 116]  Wound of left lower extremity, initial encounter [S81 012A]  DISCHARGE DATE/TIME: No discharge date for patient encounter  DISCHARGE DISPOSITION (IF DISCHARGED): Final discharge disposition not confirmed     IMPORTANT INFORMATION:  Please contact the [facility] directly with any questions or concerns regarding this request  Department voicemails are confidential     Send requests for admission clinical reviews, concurrent reviews, approvals, and administrative denials due to lack of clinical to fax [phone]

## 2021-06-08 LAB — MRSA NOSE QL CULT: NORMAL

## 2021-06-09 NOTE — UTILIZATION REVIEW
Notification of Discharge   This is a Notification of Discharge from our facility [address]  Please be advised that this patient has been discharge from our facility  Below you will find the admission and discharge date and time including the patients disposition  UTILIZATION REVIEW CONTACT:  Cristian Jones  Utilization   Network Utilization Review Department  Phone: [phone] x carefully listen to the prompts  All voicemails are confidential   Email: [email or web address]     PHYSICIAN ADVISORY SERVICES:  FOR BGKN-DS-OWMQ REVIEW - MEDICAL NECESSITY DENIAL  Phone: [phone]  Fax: [phone]  Email: [email or web address]     PRESENTATION DATE: 6/5/2021  3:46 PM  OBERVATION ADMISSION DATE:   INPATIENT ADMISSION DATE: 6/5/21 2043   DISCHARGE DATE: 6/7/2021  2:54 PM  DISPOSITION: Home/Self Care Home/Self Care      IMPORTANT INFORMATION:  Send all requests for admission clinical reviews, approved or denied determinations and any other requests to dedicated fax number below belonging to the campus where the patient is receiving treatment   List of dedicated fax numbers:  [address] DENIALS (Administrative/Medical Necessity) [phone]   [address] [place] [address] (Maternity/NICU/Pediatrics) [phone]   Anna Hi [phone]   Aaron Moles [phone]   Alexi Curran [phone]   [facility] [address] [place] [address] [phone]   [facility]  [phone]   [address] [facility]  [address] [facility] [address] [facility] [phone]

## 2021-06-10 ENCOUNTER — TELEPHONE (OUTPATIENT)
Dept: OTHER | Facility: HOSPITAL | Age: 61
End: 2021-06-10

## 2021-06-10 LAB
BACTERIA WND AEROBE CULT: ABNORMAL
GRAM STN SPEC: ABNORMAL

## 2021-06-10 NOTE — TELEPHONE ENCOUNTER
I called the patient [phone] listed on the chart multiple times  It was and office number and not a cell phone number and therefore I did not leave a message  Called and left a message for the patient's wife on her listed cell phone number to call me back  I wanted to discuss the culture results with him  The patient is on Augmentin for which the patient does have intermediate sensitivity    I left a message for the wife to call me back- but have not got a call back

## 2021-06-11 ENCOUNTER — OFFICE VISIT (OUTPATIENT)
Dept: WOUND CARE | Facility: CLINIC | Age: 61
End: 2021-06-11
Payer: COMMERCIAL

## 2021-06-11 VITALS
WEIGHT: 315 LBS | DIASTOLIC BLOOD PRESSURE: 94 MMHG | TEMPERATURE: 97.3 F | BODY MASS INDEX: 45.1 KG/M2 | HEART RATE: 86 BPM | RESPIRATION RATE: 20 BRPM | SYSTOLIC BLOOD PRESSURE: 198 MMHG | HEIGHT: 70 IN

## 2021-06-11 DIAGNOSIS — E66.01 MORBID OBESITY WITH BMI OF 50.0-59.9, ADULT (HCC): ICD-10-CM

## 2021-06-11 DIAGNOSIS — L97.929 VENOUS ULCER OF LEFT LOWER EXTREMITY WITH VARICOSE VEINS (HCC): Primary | ICD-10-CM

## 2021-06-11 DIAGNOSIS — I83.029 VENOUS ULCER OF LEFT LOWER EXTREMITY WITH VARICOSE VEINS (HCC): Primary | ICD-10-CM

## 2021-06-11 PROCEDURE — 99204 OFFICE O/P NEW MOD 45 MIN: CPT | Performed by: PREVENTIVE MEDICINE

## 2021-06-11 PROCEDURE — 99213 OFFICE O/P EST LOW 20 MIN: CPT | Performed by: PREVENTIVE MEDICINE

## 2021-06-11 RX ORDER — LIDOCAINE HYDROCHLORIDE 40 MG/ML
5 SOLUTION TOPICAL ONCE
Status: COMPLETED | OUTPATIENT
Start: 2021-06-11 | End: 2021-06-11

## 2021-06-11 RX ADMIN — LIDOCAINE HYDROCHLORIDE 5 ML: 40 SOLUTION TOPICAL at 09:00

## 2021-06-11 NOTE — PROGRESS NOTES
Patient ID: Jammie Mcmillan is a 62 y o  male Date of Birth 1960       Chief Complaint   Patient presents with    New Patient Visit     left leg wound       Allergies  Patient has no known allergies  Diagnosis:  1  Venous ulcer of left lower extremity with varicose veins (HCC)  -     lidocaine (XYLOCAINE) 4 % topical solution 5 mL  -     Wound cleansing and dressings; Future    2  Morbid obesity with BMI of 50 0-59 9, adult ([facility] )        Diagnosis ICD-10-CM Associated Orders   1  Venous ulcer of left lower extremity with varicose veins (HCC)  I83 029 lidocaine (XYLOCAINE) 4 % topical solution 5 mL    L97 929 Wound cleansing and dressings   2  Morbid obesity with BMI of 50 0-59 9, adult ([facility] )  E66 01     Z68 43           Assessment & Plan:  Reviewed recent admission records including note from vascular surgery and prior notes from [facility] wound  Large ulcer of LLE, no signs of infection today  Very extensive for pure venous disease and only effecting LLE, concern for May-Thurner  Has LEVDR scheduled and f/u with vasular surgery at the end of the month  Counseled on importance of weightloss, increasing exercise/walking, and frequent elevation of legs  Has heavy drainage requiring frequent dressing changes, recommend surepress for adequate compression  Continue current care with hydrofera and xtrasorb  F/u 2 weeks  Is moderate risk for complications      Subjective:   Presents for initial eval of LLE ulcers  Has ongoing issues with ulcers for the last 4 years, states occasionally would heal but would weep and blister  Has compression, using tubigrip and ACE currently  Was recently admitted, and now taking augmentin as prescribed   Was being seen at [address] [place] [address] prior      The following portions of the patient's history were reviewed and updated as appropriate:   Patient Active Problem List   Diagnosis    Venous ulcer of left lower extremity with varicose veins (HCC)    Acute blood loss anemia    Bleeding from varicose vein    Obesity    Essential hypertension    Chronic venous hypertension (idiopathic) with ulcer and inflammation of left lower extremity (CODE) (HCC)     Past Medical History:   Diagnosis Date    Anxiety     Hypertension     Venous stasis ulcers of both lower extremities (HCC)      No past surgical history on file  Social History     Socioeconomic History    Marital status: /Civil Union     Spouse name: Not on file    Number of children: Not on file    Years of education: Not on file    Highest education level: Not on file   Occupational History    Not on file   Social Needs    Financial resource strain: Not on file    Food insecurity     Worry: Not on file     Inability: Not on file   [employer] needs     Medical: Not on file     Non-medical: Not on file   Tobacco Use    Smoking status: Never Smoker    Smokeless tobacco: Never Used   Substance and Sexual Activity    Alcohol use:  Yes     Alcohol/week: 5 0 standard drinks     Types: 3 Cans of beer, 2 Shots of liquor per week     Frequency: 4 or more times a week     Drinks per session: 3 or 4     Binge frequency: Never    Drug use: Never    Sexual activity: Not on file   Lifestyle    Physical activity     Days per week: Not on file     Minutes per session: Not on file    Stress: Not on file   Relationships    Social connections     Talks on phone: Not on file     Gets together: Not on file     Attends Rastafarian service: Not on file     Active member of club or organization: Not on file     Attends meetings of clubs or organizations: Not on file     Relationship status: Not on file    Intimate partner violence     Fear of current or ex partner: Not on file     Emotionally abused: Not on file     Physically abused: Not on file     Forced sexual activity: Not on file   Other Topics Concern    Not on file   Social History Narrative    Not on file        Current Outpatient Medications:     amoxicillin-clavulanate (AUGMENTIN) 875-125 mg per tablet, Take 1 tablet by mouth every 12 (twelve) hours for 5 days, Disp: 10 tablet, Rfl: 0    fexofenadine (ALLEGRA) 180 MG tablet, Take 1 tablet by mouth daily, Disp: , Rfl:     lisinopril (ZESTRIL) 20 mg tablet, , Disp: , Rfl:     metoprolol succinate (TOPROL-XL) 100 mg 24 hr tablet, , Disp: , Rfl:     naproxen sodium (ALEVE) 220 MG tablet, Take 1 tablet by mouth every 8 (eight) hours, Disp: , Rfl:     oxyCODONE (ROXICODONE) 5 mg immediate release tablet, Take 1 tablet (5 mg total) by mouth every 4 (four) hours as needed for moderate pain for up to 10 daysMax Daily Amount: 30 mg, Disp: 20 tablet, Rfl: 0    saccharomyces boulardii (FLORASTOR) 250 mg capsule, Take 1 capsule (250 mg total) by mouth 2 (two) times a day for 5 days, Disp: 10 capsule, Rfl: 0    Santyl ointment, , Disp: , Rfl:     sertraline (ZOLOFT) 100 mg tablet, , Disp: , Rfl:   No current facility-administered medications for this visit  No family history on file  Review of Systems   Constitutional: Negative  Respiratory: Negative  Cardiovascular: Positive for leg swelling  Skin: Positive for wound  Negative for rash  Neurological: Negative  Psychiatric/Behavioral: Negative  Objective:  BP (!) 198/94   Pulse 86   Temp (!) 97 3 °F (36 3 °C)   Resp 20   Ht 5' 10" (1 778 m)   Wt (!) 163 kg (360 lb)   BMI 51 65 kg/m²     Physical Exam  Vitals signs reviewed  Constitutional:       General: He is not in acute distress  Appearance: Normal appearance  He is obese  HENT:      Head: Normocephalic and atraumatic  Eyes:      Extraocular Movements: Extraocular movements intact  Pupils: Pupils are equal, round, and reactive to light  Neck:      Musculoskeletal: Normal range of motion  Cardiovascular:      Rate and Rhythm: Normal rate  Pulses: Normal pulses  Pulmonary:      Effort: Pulmonary effort is normal    Musculoskeletal: Normal range of motion        Right lower leg: No edema  Left lower leg: Edema present  Skin:     General: Skin is warm and dry  Capillary Refill: Capillary refill takes less than 2 seconds  Comments: See wound assessment   Neurological:      General: No focal deficit present  Mental Status: He is alert and oriented to person, place, and time  Psychiatric:         Mood and Affect: Mood normal          Behavior: Behavior normal          Thought Content: Thought content normal          Judgment: Judgment normal              Wound 06/11/21 Venous Ulcer Pretibial Left (Active)   Wound Image    06/11/21 0857   Wound Description Pink;Slough; Epithelialization 06/11/21 0855   Hui-wound Assessment Fragile; Maceration; Hyperpigmented 06/11/21 0855   Wound Length (cm) 16 2 cm 06/11/21 0855   Wound Width (cm) 34 cm 06/11/21 0855   Wound Depth (cm) 0 2 cm 06/11/21 0855   Wound Surface Area (cm^2) 550 8 cm^2 06/11/21 0855   Wound Volume (cm^3) 110 16 cm^3 06/11/21 0855   Calculated Wound Volume (cm^3) 110 16 cm^3 06/11/21 0855   Drainage Amount Moderate 06/11/21 0855   Drainage Description Bloody; Serosanguineous 06/11/21 0855   Non-staged Wound Description Full thickness 06/11/21 0855   Dressing Status Intact 06/11/21 0855                             Procedures     Results from last 6 Months   Lab Units 06/06/21  0849   WOUND CULTURE  3+ Growth of Escherichia coli*  3+ Growth of Proteus mirabilis*  3+ Growth of Pseudomonas aeruginosa*  1+ Growth of        Wound Instructions:  Orders Placed This Encounter   Procedures    Wound cleansing and dressings     Left lower leg:    Wash your hands with soap and water  Remove old dressing, discard into plastic bag and place in trash  Cleanse the wound with mild unscented dove soap and water prior to applying a clean dressing  Do not use tissue or cotton balls  Do not scrub the wound  Pat dry using gauze  Ok to shower on dressing change days      Apply a barrier cream around the wound, only apply to intact skin  Calmoseptine applied but desatin or anything with zinc will protect the intact skin  You can buy this over the counter  Apply hydraferra blue to the wound bed, cover with absorbant blue pad, ABD as necessary, rolled gauze and secure with a surepress  Change dressing every 2 days (wife to do dressing changes)    Walking will help  When seated keep legs elevated  Avoid standing in 1 place for long periods of time  Weight loss will also help aid in healing  Please try to eat 3-4 servings of protein per day (protein must be the main focus of your meals for weight loss and healing)    This was done today at the wound care center  See back in 2 weeks  Standing Status:   Future     Standing Expiration Date:   6/11/2022         Vannesa Vega DO    Portions of the record may have been created with voice recognition software  Occasional wrong word or "sound a like" substitutions may have occurred due to the inherent limitations of voice recognition software  Read the chart carefully and recognize, using context, where substitutions have occurred

## 2021-06-11 NOTE — PATIENT INSTRUCTIONS
Orders Placed This Encounter   Procedures    Wound cleansing and dressings     Left lower leg:    Wash your hands with soap and water  Remove old dressing, discard into plastic bag and place in trash  Cleanse the wound with mild unscented dove soap and water prior to applying a clean dressing  Do not use tissue or cotton balls  Do not scrub the wound  Pat dry using gauze  Ok to shower on dressing change days  Apply a barrier cream around the wound, only apply to intact skin  Calmoseptine applied but desatin or anything with zinc will protect the intact skin  You can buy this over the counter  Apply hydraferra blue to the wound bed, cover with absorbant blue pad, ABD as necessary, rolled gauze and secure with a surepress  Change dressing every 2 days (wife to do dressing changes)    Walking will help  When seated keep legs elevated  Avoid standing in 1 place for long periods of time  Weight loss will also help aid in healing  Please try to eat 3-4 servings of protein per day (protein must be the main focus of your meals for weight loss and healing)    This was done today at the wound care center  See back in 2 weeks       Standing Status:   Future     Standing Expiration Date:   6/11/2022

## 2021-06-15 ENCOUNTER — TELEPHONE (OUTPATIENT)
Dept: WOUND CARE | Facility: CLINIC | Age: 61
End: 2021-06-15

## 2021-06-15 DIAGNOSIS — I83.029 VENOUS ULCER OF LEFT LOWER EXTREMITY WITH VARICOSE VEINS (HCC): ICD-10-CM

## 2021-06-15 DIAGNOSIS — L97.929 VENOUS ULCER OF LEFT LOWER EXTREMITY WITH VARICOSE VEINS (HCC): ICD-10-CM

## 2021-06-15 NOTE — TELEPHONE ENCOUNTER
Patients wife called asking for refill son codeine  Tiger text to Cindy Patten Dr states he is unable to prescribe  Pt just got #20 tabs on 6/7/21  He states he should take NSAIDs and tylenol otc and other meds sparingly  Called and left a vague message on pts cell phone stating that we cannot refill his meds but that he can take otc medications and to call for further information

## 2021-06-18 ENCOUNTER — TELEPHONE (OUTPATIENT)
Dept: WOUND CARE | Facility: CLINIC | Age: 61
End: 2021-06-18

## 2021-06-18 DIAGNOSIS — I83.029 VENOUS ULCER OF LEFT LOWER EXTREMITY WITH VARICOSE VEINS (HCC): ICD-10-CM

## 2021-06-18 DIAGNOSIS — L97.929 VENOUS ULCER OF LEFT LOWER EXTREMITY WITH VARICOSE VEINS (HCC): ICD-10-CM

## 2021-06-18 RX ORDER — OXYCODONE HYDROCHLORIDE 5 MG/1
5 TABLET ORAL EVERY 6 HOURS PRN
Qty: 20 TABLET | Refills: 0 | Status: SHIPPED | OUTPATIENT
Start: 2021-06-18 | End: 2021-06-28

## 2021-06-18 NOTE — PROGRESS NOTES
Patient called requesting refill of pain meds  Have a lot of pain in leg, is scheduled to also f/u with vascular

## 2021-06-18 NOTE — TELEPHONE ENCOUNTER
Pts wife, Delmer Evans, called asking for refills on pain meds  I called back and spoke to her she states pt got oxycodone 5mg #20 on 6/7  Dr Melissa Chaidez is Johanna Harp time for same but then pt will need to follow up with PCP or pain management  Wife states that pt will be seeing vascular on 6/25 and she will talk to them about his pain as well but that she wanted something to offer her  in the meantime  I reminded her that he should use OTC pain relievers first then stronger pain meds only when necessary  She verbalized understanding

## 2021-06-24 ENCOUNTER — OFFICE VISIT (OUTPATIENT)
Dept: WOUND CARE | Facility: CLINIC | Age: 61
End: 2021-06-24
Payer: COMMERCIAL

## 2021-06-24 VITALS
TEMPERATURE: 96.1 F | DIASTOLIC BLOOD PRESSURE: 105 MMHG | SYSTOLIC BLOOD PRESSURE: 217 MMHG | RESPIRATION RATE: 18 BRPM | HEART RATE: 94 BPM

## 2021-06-24 DIAGNOSIS — I83.029 VENOUS ULCER OF LEFT LOWER EXTREMITY WITH VARICOSE VEINS (HCC): Primary | ICD-10-CM

## 2021-06-24 DIAGNOSIS — L97.929 VENOUS ULCER OF LEFT LOWER EXTREMITY WITH VARICOSE VEINS (HCC): Primary | ICD-10-CM

## 2021-06-24 PROCEDURE — 11045 DBRDMT SUBQ TISS EACH ADDL: CPT | Performed by: FAMILY MEDICINE

## 2021-06-24 PROCEDURE — 11042 DBRDMT SUBQ TIS 1ST 20SQCM/<: CPT | Performed by: FAMILY MEDICINE

## 2021-06-24 RX ORDER — LIDOCAINE HYDROCHLORIDE 40 MG/ML
5 SOLUTION TOPICAL ONCE
Status: COMPLETED | OUTPATIENT
Start: 2021-06-24 | End: 2021-06-24

## 2021-06-24 RX ADMIN — LIDOCAINE HYDROCHLORIDE 5 ML: 40 SOLUTION TOPICAL at 08:26

## 2021-06-24 NOTE — PATIENT INSTRUCTIONS
Orders Placed This Encounter   Procedures    Wound cleansing and dressings     Left lower leg:     Wash your hands with soap and water  Remove old dressing, discard into plastic bag and place in trash  Cleanse the wound with mild unscented dove soap and water prior to applying a clean dressing  Do not use tissue or cotton balls  Do not scrub the wound  Pat dry using gauze      Ok to shower on dressing change days      Apply a barrier cream around the wound, only apply to intact skin  Calmoseptine applied but desatin or anything with zinc will protect the intact skin  You can buy this over the counter      Apply hydraferra blue to the wound bed, cover with absorbant blue pad, ABD as necessary, rolled gauze and secure with a surepress      Change dressing every 2 days (wife to do dressing changes)     Walking will help  When seated keep legs elevated  Avoid standing in 1 place for long periods of time  When seated it is important to keep legs elevated (above the heart if possible)     Weight loss will also help aid in healing  Please try to eat 3-4 servings of protein per day (protein must be the main focus of your meals for weight loss and healing)     This was done today at the wound care center  See back in 2 weeks       Standing Status:   Future     Standing Expiration Date:   6/24/2022

## 2021-06-24 NOTE — PROGRESS NOTES
Patient ID: Festus Begum is a 62 y o  male Date of Birth 1960     Chief Complaint  Chief Complaint   Patient presents with    Follow Up Wound Care Visit     LLE wound       Allergies  Patient has no known allergies  Assessment:    Venous ulcer of left lower extremity with varicose veins (HCC)   Wound is somewhat improved   Debrided as below   Continue wound management with hydrofera below  Continue compression with sure press  Keep leg elevated as much as possible   Followup  In 2 weeks or call sooner with questions or concerns       Diagnoses and all orders for this visit:    Venous ulcer of left lower extremity with varicose veins (HCC)  -     lidocaine (XYLOCAINE) 4 % topical solution 5 mL  -     Wound cleansing and dressings; Future    Other orders  -     Debridement              Debridement   Wound 06/11/21 Venous Ulcer Pretibial Left    Universal Protocol:  Consent: Verbal consent obtained  Consent given by: patient  Time out: Immediately prior to procedure a "time out" was called to verify the correct patient, procedure, equipment, support staff and site/side marked as required    Timeout called at: 6/24/2021 8:25 AM   Patient understanding: patient states understanding of the procedure being performed  Patient identity confirmed: verbally with patient      Performed by: physician  Debridement type: surgical  Level of debridement: subcutaneous tissue  Pain control: lidocaine 4%  Post-debridement measurements  Length (cm): 16 5  Width (cm): 29 5  Depth (cm): 0 3  Percent debrided: 60%  Surface Area (cm^2): 486 75  Area debrided (cm^2): 292 05  Volume (cm^3): 146 03  Tissue and other material debrided: subcutaneous tissue  Devitalized tissue debrided: exudate and slough  Instrument(s) utilized: curette  Bleeding: small  Hemostasis obtained with: pressure  Response to treatment: procedure was tolerated well          Plan:     Wound 06/11/21 Venous Ulcer Pretibial Left (Active)   Wound Image Images linked 06/24/21 0856   Wound Description Beefy red;Yellow;Pink 06/24/21 0825   Hui-wound Assessment Erythema;Fragile; Hyperpigmented 06/24/21 0825   Wound Length (cm) 16 5 cm 06/24/21 0825   Wound Width (cm) 29 5 cm 06/24/21 0825   Wound Depth (cm) 0 2 cm 06/24/21 0825   Wound Surface Area (cm^2) 486 75 cm^2 06/24/21 0825   Wound Volume (cm^3) 97 35 cm^3 06/24/21 0825   Calculated Wound Volume (cm^3) 97 35 cm^3 06/24/21 0825   Change in Wound Size % 11 63 06/24/21 0825   Drainage Amount Copious 06/24/21 0825   Drainage Description Serosanguineous 06/24/21 0825   Non-staged Wound Description Full thickness 06/24/21 0825   Wound packed? No 06/24/21 0825       Wound 06/11/21 Venous Ulcer Pretibial Left (Active)   Date First Assessed/Time First Assessed: 06/11/21 0854   Primary Wound Type: Venous Ulcer  Location: Pretibial  Wound Location Orientation: Left       Subjective: Elke Pink Presents for followup of left lower extremity venous ulcers  No increased pain or drainage  Patient still reports significant drainage  Has been using Hydrofera blue on the wound and sure press for compression      The following portions of the patient's history were reviewed and updated as appropriate: He  has a past medical history of Anxiety, Hypertension, and Venous stasis ulcers of both lower extremities ([address] )  He  reports that he has never smoked  He has never used smokeless tobacco  He reports current alcohol use of about 5 0 standard drinks of alcohol per week  He reports that he does not use drugs  He has No Known Allergies       Review of Systems   Constitutional: Negative for chills and fever  HENT: Negative for congestion and sneezing  Respiratory: Negative for cough  Cardiovascular: Positive for leg swelling  Skin: Positive for wound  Psychiatric/Behavioral: Negative for agitation           Objective:       Wound 06/11/21 Venous Ulcer Pretibial Left (Active)   Wound Image Images linked 06/24/21 0856   Wound Description Beefy red;Yellow;Pink 06/24/21 0825   Hui-wound Assessment Erythema;Fragile; Hyperpigmented 06/24/21 0825   Wound Length (cm) 16 5 cm 06/24/21 0825   Wound Width (cm) 29 5 cm 06/24/21 0825   Wound Depth (cm) 0 2 cm 06/24/21 0825   Wound Surface Area (cm^2) 486 75 cm^2 06/24/21 0825   Wound Volume (cm^3) 97 35 cm^3 06/24/21 0825   Calculated Wound Volume (cm^3) 97 35 cm^3 06/24/21 0825   Change in Wound Size % 11 63 06/24/21 0825   Drainage Amount Copious 06/24/21 0825   Drainage Description Serosanguineous 06/24/21 0825   Non-staged Wound Description Full thickness 06/24/21 0825   Wound packed? No 06/24/21 0825       BP (!) 217/105   Pulse 94   Temp (!) 96 1 °F (35 6 °C)   Resp 18     Physical Exam        Wound 06/11/21 Venous Ulcer Pretibial Left (Active)   Wound Image    06/24/21 0856   Wound Description Beefy red;Yellow;Pink 06/24/21 0825   Hui-wound Assessment Erythema;Fragile; Hyperpigmented 06/24/21 0825   Wound Length (cm) 16 5 cm 06/24/21 0825   Wound Width (cm) 29 5 cm 06/24/21 0825   Wound Depth (cm) 0 2 cm 06/24/21 0825   Wound Surface Area (cm^2) 486 75 cm^2 06/24/21 0825   Wound Volume (cm^3) 97 35 cm^3 06/24/21 0825   Calculated Wound Volume (cm^3) 97 35 cm^3 06/24/21 0825   Change in Wound Size % 11 63 06/24/21 0825   Drainage Amount Copious 06/24/21 0825   Drainage Description Serosanguineous 06/24/21 0825   Non-staged Wound Description Full thickness 06/24/21 0825   Wound packed? No 06/24/21 0825                       Wound Instructions:  Orders Placed This Encounter   Procedures    Wound cleansing and dressings     Left lower leg:     Wash your hands with soap and water  Remove old dressing, discard into plastic bag and place in trash  Cleanse the wound with mild unscented dove soap and water prior to applying a clean dressing  Do not use tissue or cotton balls  Do not scrub the wound   Pat dry using gauze      Ok to shower on dressing change days      Apply a barrier cream around the wound, only apply to intact skin  Calmoseptine applied but desatin or anything with zinc will protect the intact skin  You can buy this over the counter      Apply hydraferra blue to the wound bed, cover with absorbant blue pad, ABD as necessary, rolled gauze and secure with a surepress      Change dressing every 2 days (wife to do dressing changes)     Walking will help  When seated keep legs elevated  Avoid standing in 1 place for long periods of time  When seated it is important to keep legs elevated (above the heart if possible)     Weight loss will also help aid in healing  Please try to eat 3-4 servings of protein per day (protein must be the main focus of your meals for weight loss and healing)     This was done today at the wound care center  See back in 2 weeks  Standing Status:   Future     Standing Expiration Date:   6/24/2022    Debridement     This order was created via procedure documentation        Diagnosis ICD-10-CM Associated Orders   1   Venous ulcer of left lower extremity with varicose veins ([facility])  I83 029 lidocaine (XYLOCAINE) 4 % topical solution 5 mL    L97 929 Wound cleansing and dressings

## 2021-06-24 NOTE — ASSESSMENT & PLAN NOTE
Wound is somewhat improved   Debrided as below   Continue wound management with hydrofera below  Continue compression with sure press  Keep leg elevated as much as possible   Followup  In 2 weeks or call sooner with questions or concerns

## 2021-06-29 ENCOUNTER — HOSPITAL ENCOUNTER (OUTPATIENT)
Dept: VASCULAR ULTRASOUND | Facility: HOSPITAL | Age: 61
Discharge: HOME | End: 2021-06-29
Payer: COMMERCIAL

## 2021-06-29 DIAGNOSIS — L97.929 VENOUS ULCER OF LEFT LOWER EXTREMITY WITH VARICOSE VEINS (HCC): ICD-10-CM

## 2021-06-29 DIAGNOSIS — I83.029 VENOUS ULCER OF LEFT LOWER EXTREMITY WITH VARICOSE VEINS (HCC): ICD-10-CM

## 2021-06-29 PROCEDURE — 93970 EXTREMITY STUDY: CPT | Performed by: SURGERY

## 2021-06-29 PROCEDURE — 93970 EXTREMITY STUDY: CPT

## 2021-07-06 ENCOUNTER — TELEPHONE (OUTPATIENT)
Dept: WOUND CARE | Facility: CLINIC | Age: 61
End: 2021-07-06

## 2021-07-06 NOTE — TELEPHONE ENCOUNTER
Patients wife, Sujata Weathers, called to ask if we set patient up with pain managment  She reports he is tearful during dressing changes  She did report they do still have a few pills left  This nurse reported to patient wife that if out of pain medication and severe pain they should go to the ER  Reported will let MD know that patient is wanting to get set up with pain management

## 2021-07-09 ENCOUNTER — OFFICE VISIT (OUTPATIENT)
Dept: WOUND CARE | Facility: CLINIC | Age: 61
End: 2021-07-09
Payer: COMMERCIAL

## 2021-07-09 VITALS
DIASTOLIC BLOOD PRESSURE: 83 MMHG | HEART RATE: 79 BPM | RESPIRATION RATE: 20 BRPM | TEMPERATURE: 96.4 F | SYSTOLIC BLOOD PRESSURE: 146 MMHG

## 2021-07-09 DIAGNOSIS — I83.029 VENOUS ULCER OF LEFT LOWER EXTREMITY WITH VARICOSE VEINS (HCC): Primary | ICD-10-CM

## 2021-07-09 DIAGNOSIS — L97.929 VENOUS ULCER OF LEFT LOWER EXTREMITY WITH VARICOSE VEINS (HCC): Primary | ICD-10-CM

## 2021-07-09 DIAGNOSIS — E66.01 MORBID OBESITY WITH BMI OF 50.0-59.9, ADULT (HCC): ICD-10-CM

## 2021-07-09 PROCEDURE — 99214 OFFICE O/P EST MOD 30 MIN: CPT | Performed by: PREVENTIVE MEDICINE

## 2021-07-09 PROCEDURE — 99213 OFFICE O/P EST LOW 20 MIN: CPT | Performed by: PREVENTIVE MEDICINE

## 2021-07-09 RX ORDER — LIDOCAINE HYDROCHLORIDE 40 MG/ML
5 SOLUTION TOPICAL ONCE
Status: COMPLETED | OUTPATIENT
Start: 2021-07-09 | End: 2021-07-09

## 2021-07-09 RX ORDER — OXYCODONE HYDROCHLORIDE 5 MG/1
5 TABLET ORAL EVERY 6 HOURS PRN
Qty: 30 TABLET | Refills: 0 | Status: SHIPPED | OUTPATIENT
Start: 2021-07-09 | End: 2021-07-30 | Stop reason: SDUPTHER

## 2021-07-09 RX ADMIN — LIDOCAINE HYDROCHLORIDE 5 ML: 40 SOLUTION TOPICAL at 09:23

## 2021-07-09 NOTE — PROGRESS NOTES
Patient ID: Adolfo Taylor is a 62 y o  male Date of Birth 1960       Chief Complaint   Patient presents with    Follow Up Wound Care Visit     left leg ulcer       Allergies:  Patient has no known allergies  Diagnosis:  1  Venous ulcer of left lower extremity with varicose veins (HCC)  -     lidocaine (XYLOCAINE) 4 % topical solution 5 mL  -     Wound cleansing and dressings; Future  -     Wound compression and edema control; Future  -     Wound miscellaneous orders; Future  -     Ambulatory referral to Pain Management; Future  -     oxyCODONE (ROXICODONE) 5 mg immediate release tablet; Take 1 tablet (5 mg total) by mouth every 6 (six) hours as needed for severe painMax Daily Amount: 20 mg    2  Morbid obesity with BMI of 50 0-59 9, adult ([facility] )       Diagnosis ICD-10-CM Associated Orders   1  Venous ulcer of left lower extremity with varicose veins (HCC)  I83 029 lidocaine (XYLOCAINE) 4 % topical solution 5 mL    L97 929 Wound cleansing and dressings     Wound compression and edema control     Wound miscellaneous orders     Ambulatory referral to Pain Management     oxyCODONE (ROXICODONE) 5 mg immediate release tablet   2  Morbid obesity with BMI of 50 0-59 9, adult ([facility] )  E66 01     Z68 43         Assessment & Plan:  See wound orders  Very painful, unable to debride, refilled oxy and placed referral to PM  Continue current care with compression, frequent elevation of leg, increase exercise/walking  Again counseled on the importance of weightloss  Reviewed LEVDR, showing both deep and GSV incompetence  Still very concerned for [place]urner with severity of ulcer, await further recs from vascular surgery  Remains moderate risk for complications  F/u 3 weeks    Subjective:   F/u LLE ulcer  Very painful  No new complaints   Wife thinks it is slowly improving      The following portions of the patient's history were reviewed and updated as appropriate:   Patient Active Problem List   Diagnosis    Venous ulcer of left lower extremity with varicose veins (HCC)    Acute blood loss anemia    Bleeding from varicose vein    Obesity    Essential hypertension    Chronic venous hypertension (idiopathic) with ulcer and inflammation of left lower extremity (CODE) (HCC)     Past Medical History:   Diagnosis Date    Anxiety     Hypertension     Venous stasis ulcers of both lower extremities (HCC)      No past surgical history on file  Social History     Socioeconomic History    Marital status: /Civil Union     Spouse name: None    Number of children: None    Years of education: None    Highest education level: None   Occupational History    None   Tobacco Use    Smoking status: Never Smoker    Smokeless tobacco: Never Used   Substance and Sexual Activity    Alcohol use: Yes     Alcohol/week: 5 0 standard drinks     Types: 3 Cans of beer, 2 Shots of liquor per week    Drug use: Never    Sexual activity: None   Other Topics Concern    None   Social History Narrative    None     Social Determinants of Health     Financial Resource Strain:     Difficulty of Paying Living Expenses:    Food Insecurity:     Worried About Running Out of Food in the Last Year:     Ran Out of Food in the Last Year:    Transportation Needs:     Lack of Transportation (Medical):      Lack of Transportation (Non-Medical):    Physical Activity:     Days of Exercise per Week:     Minutes of Exercise per Session:    Stress:     Feeling of Stress :    Social Connections:     Frequency of Communication with Friends and Family:     Frequency of Social Gatherings with Friends and Family:     Attends Gnosticism Services:     Active Member of Clubs or Organizations:     Attends Club or Organization Meetings:     Marital Status:    Intimate Partner Violence:     Fear of Current or Ex-Partner:     Emotionally Abused:     Physically Abused:     Sexually Abused:         Current Outpatient Medications:     fexofenadine (ALLEGRA) 180 MG tablet, Take 1 tablet by mouth daily, Disp: , Rfl:     lisinopril (ZESTRIL) 20 mg tablet, , Disp: , Rfl:     metoprolol succinate (TOPROL-XL) 100 mg 24 hr tablet, , Disp: , Rfl:     naproxen sodium (ALEVE) 220 MG tablet, Take 1 tablet by mouth every 8 (eight) hours, Disp: , Rfl:     oxyCODONE (ROXICODONE) 5 mg immediate release tablet, Take 1 tablet (5 mg total) by mouth every 6 (six) hours as needed for severe painMax Daily Amount: 20 mg, Disp: 30 tablet, Rfl: 0    Santyl ointment, , Disp: , Rfl:     sertraline (ZOLOFT) 100 mg tablet, , Disp: , Rfl:   No current facility-administered medications for this visit  No family history on file  Review of Systems   Constitutional: Negative  Respiratory: Negative  Cardiovascular: Positive for leg swelling  Skin: Positive for wound  Objective:  /83   Pulse 79   Temp (!) 96 4 °F (35 8 °C)   Resp 20     Physical Exam  Vitals reviewed  Constitutional:       General: He is not in acute distress  Appearance: He is obese  Cardiovascular:      Rate and Rhythm: Normal rate  Pulmonary:      Effort: Pulmonary effort is normal    Musculoskeletal:      Left lower leg: Edema present  Skin:     Comments: See wound assessment   Neurological:      General: No focal deficit present  Mental Status: He is alert and oriented to person, place, and time  Psychiatric:         Mood and Affect: Mood normal          Behavior: Behavior normal              Wound 06/11/21 Venous Ulcer Pretibial Left (Active)   Wound Image    07/09/21 0920   Wound Description Pink;Slough; Epithelialization 07/09/21 0919   Hui-wound Assessment Fragile; Maceration; Hyperpigmented 07/09/21 0919   Wound Length (cm) 15 8 cm 07/09/21 0919   Wound Width (cm) 30 cm 07/09/21 0919   Wound Depth (cm) 0 2 cm 07/09/21 0919   Wound Surface Area (cm^2) 474 cm^2 07/09/21 0919   Wound Volume (cm^3) 94 8 cm^3 07/09/21 0919   Calculated Wound Volume (cm^3) 94 8 cm^3 07/09/21 0919   Change in Wound Size % 13 94 07/09/21 0919   Drainage Amount Large 07/09/21 0919   Drainage Description Serosanguineous 07/09/21 0919   Non-staged Wound Description Full thickness 07/09/21 0919   Dressing Status Intact 07/09/21 0919                         Procedures     Results from last 6 Months   Lab Units 06/06/21  0849   WOUND CULTURE  3+ Growth of Escherichia coli*  3+ Growth of Proteus mirabilis*  3+ Growth of Pseudomonas aeruginosa*  1+ Growth of          Wound Instructions:  Orders Placed This Encounter   Procedures    Wound cleansing and dressings     Wound cleansing and dressings       Left lower leg:     Wash your hands with soap and water  Remove old dressing, discard into plastic bag and place in trash  Cleanse the wound with mild unscented dove soap and water prior to applying a clean dressing  Dakins wash done today at wound care center  Do not use tissue or cotton balls  Do not scrub the wound  Pat dry using gauze      Ok to shower on dressing change days      Apply a barrier cream around the wound, only apply to intact skin  Calmoseptine applied but desatin or anything with zinc will protect the intact skin  You can buy this over the counter      Apply hydraferra blue to the wound bed, cover with absorbant blue pad, ABD as necessary, rolled gauze and secure tape  Aply with a sure press for compression      Change dressing every 2 days (wife to do dressing changes)     Walking will help  When seated keep legs elevated  Avoid standing in 1 place for long periods of time  When seated it is important to keep legs elevated (above the heart if possible)     Weight loss will also help aid in healing  Please try to eat 3-4 servings of protein per day (protein must be the main focus of your meals for weight loss and healing)     This was done today at the wound care center       Standing Status:   Future     Standing Expiration Date:   7/9/2022    Wound compression and edema control Surepress    Apply compression wrap to your affected Leg(s) from mid-foot to knee making sure to cover the heel  Apply in the morning and re-wrap as needed during the day if wrap becomes loose  Remove at bedtime and elevate legs or lie down  Avoid prolonged standing in one place  Elevate leg(s) above the level of the heart when sitting or as much as possible  Standing Status:   Future     Standing Expiration Date:   7/9/2022    Wound miscellaneous orders     Referral put in today for pain management     Standing Status:   Future     Standing Expiration Date:   7/9/2022    Ambulatory referral to Pain Management     Standing Status:   Future     Standing Expiration Date:   7/9/2022     Referral Priority:   Routine     Referral Type:   Consult - AMB     Referral Reason:   Specialty Services Required     Requested Specialty:   Pain Medicine     Number of Visits Requested:   1     Expiration Date:   7/9/2022         Ministerio Parikh DO      Portions of the record may have been created with voice recognition software  Occasional wrong word or "sound a like" substitutions may have occurred due to the inherent limitations of voice recognition software  Read the chart carefully and recognize, using context, where substitutions have occurred

## 2021-07-09 NOTE — PATIENT INSTRUCTIONS
Orders Placed This Encounter   Procedures    Wound cleansing and dressings     Wound cleansing and dressings       Left lower leg:     Wash your hands with soap and water  Remove old dressing, discard into plastic bag and place in trash  Cleanse the wound with mild unscented dove soap and water prior to applying a clean dressing  Dakins wash done today at wound care center  Do not use tissue or cotton balls  Do not scrub the wound  Pat dry using gauze      Ok to shower on dressing change days      Apply a barrier cream around the wound, only apply to intact skin  Calmoseptine applied but desatin or anything with zinc will protect the intact skin  You can buy this over the counter      Apply hydraferra blue to the wound bed, cover with absorbant blue pad, ABD as necessary, rolled gauze and secure tape  Aply with a sure press for compression      Change dressing every 2 days (wife to do dressing changes)     Walking will help  When seated keep legs elevated  Avoid standing in 1 place for long periods of time  When seated it is important to keep legs elevated (above the heart if possible)     Weight loss will also help aid in healing  Please try to eat 3-4 servings of protein per day (protein must be the main focus of your meals for weight loss and healing)     This was done today at the wound care center  Standing Status:   Future     Standing Expiration Date:   7/9/2022    Wound compression and edema control     Surepress    Apply compression wrap to your affected Leg(s) from mid-foot to knee making sure to cover the heel  Apply in the morning and re-wrap as needed during the day if wrap becomes loose  Remove at bedtime and elevate legs or lie down  Avoid prolonged standing in one place  Elevate leg(s) above the level of the heart when sitting or as much as possible       Standing Status:   Future     Standing Expiration Date:   7/9/2022    Wound miscellaneous orders     Referral put in today for pain management     Standing Status:   Future     Standing Expiration Date:   7/9/2022

## 2021-07-26 ENCOUNTER — TELEPHONE (OUTPATIENT)
Dept: ADMINISTRATIVE | Facility: HOSPITAL | Age: 61
End: 2021-07-26

## 2021-07-26 ENCOUNTER — OFFICE VISIT (OUTPATIENT)
Dept: VASCULAR SURGERY | Facility: CLINIC | Age: 61
End: 2021-07-26
Payer: COMMERCIAL

## 2021-07-26 ENCOUNTER — PREP FOR PROCEDURE (OUTPATIENT)
Dept: VASCULAR SURGERY | Facility: CLINIC | Age: 61
End: 2021-07-26

## 2021-07-26 VITALS
DIASTOLIC BLOOD PRESSURE: 84 MMHG | WEIGHT: 315 LBS | HEIGHT: 70 IN | TEMPERATURE: 98.1 F | BODY MASS INDEX: 45.1 KG/M2 | SYSTOLIC BLOOD PRESSURE: 140 MMHG | HEART RATE: 74 BPM

## 2021-07-26 DIAGNOSIS — L97.929 VENOUS ULCER OF LEFT LOWER EXTREMITY WITH VARICOSE VEINS (HCC): Primary | ICD-10-CM

## 2021-07-26 DIAGNOSIS — I83.029 VENOUS ULCER OF LEFT LOWER EXTREMITY WITH VARICOSE VEINS (HCC): Primary | ICD-10-CM

## 2021-07-26 PROCEDURE — 99245 OFF/OP CONSLTJ NEW/EST HI 55: CPT | Performed by: SURGERY

## 2021-07-26 RX ORDER — CHLORHEXIDINE GLUCONATE 0.12 MG/ML
15 RINSE ORAL ONCE
Status: CANCELLED | OUTPATIENT
Start: 2021-07-26 | End: 2021-07-26

## 2021-07-26 RX ORDER — CHLORHEXIDINE GLUCONATE 0.12 MG/ML
15 RINSE ORAL EVERY 12 HOURS SCHEDULED
Status: CANCELLED | OUTPATIENT
Start: 2021-07-26

## 2021-07-26 NOTE — PROGRESS NOTES
Assessment/Plan:    Venous ulcer of left lower extremity with varicose veins ([facility] [address] )     Mr Ever Phelan is a 66yo male with morbid obesity, HTN, depression, left leg chronic venous stasis with venous HTN and chronic venous ulcers with worsening wounds with associated cellulitis and bleeding from varicosities  Has been undergoing wound care with mild improvement for the past 6 weeks  Underwent reflux study which demonstrates superficial and deep venous insufficiency of the Left Lower extremity  Given extent and duration of wounds I feel he would benefit from Left GSV EVLT (ablation) to decrease underlying venous reflux to assist with wound healing  He will need ongoing wound care and compression therapy long term given underlying deep venous insufficiency  I have spent 30 minutes with Patient and family today in which greater than 50% of this time was spent in counseling/coordination of care regarding Diagnostic results, Prognosis, Risks and benefits of tx options, Intructions for management, Patient and family education, Importance of tx compliance, Risk factor reductions and Impressions  Problem List Items Addressed This Visit        Cardiovascular and Mediastinum    Venous ulcer of left lower extremity with varicose veins ([facility] ) - Primary        Mr Ever Phelan is a 66yo male with morbid obesity, HTN, depression, left leg chronic venous stasis with venous HTN and chronic venous ulcers with worsening wounds with associated cellulitis and bleeding from varicosities  Has been undergoing wound care with mild improvement for the past 6 weeks  Underwent reflux study which demonstrates superficial and deep venous insufficiency of the Left Lower extremity  Given extent and duration of wounds I feel he would benefit from Left GSV EVLT (ablation) to decrease underlying venous reflux to assist with wound healing   He will need ongoing wound care and compression therapy long term given underlying deep venous insufficiency  I have spent 30 minutes with Patient and family today in which greater than 50% of this time was spent in counseling/coordination of care regarding Diagnostic results, Prognosis, Risks and benefits of tx options, Intructions for management, Patient and family education, Importance of tx compliance, Risk factor reductions and Impressions  Relevant Orders    Case request operating room: ENDOVASCULAR LASER THERAPY (EVLT), Left GSV (Completed)            Subjective:      Patient ID: Marion Irwin is a 62 y o  male  Patient w/ LLE venous ulcer x 4 years off and on  Presents today for evaluation  Reflux study done 6/29  Patient also sees wound care every 2 weeks      The following portions of the patient's history were reviewed and updated as appropriate: allergies, current medications, past family history, past medical history, past social history, past surgical history and problem list     Review of Systems   Constitutional: Negative  HENT: Negative  Eyes: Negative  Respiratory: Positive for shortness of breath  Cardiovascular: Positive for leg swelling  Gastrointestinal: Negative  Endocrine: Negative  Genitourinary: Negative  Musculoskeletal: Negative  Skin: Positive for color change  ulcer   Allergic/Immunologic: Negative  Neurological: Negative  Hematological: Negative  Psychiatric/Behavioral: Negative  I have reviewed and updated the ROS as appropriate  Objective:      /84 (BP Location: Left arm, Patient Position: Sitting)   Pulse 74   Temp 98 1 °F (36 7 °C) (Tympanic)   Ht 5' 10" (1 778 m)   Wt (!) 170 kg (375 lb)   BMI 53 81 kg/m²          Physical Exam  Constitutional:       Appearance: He is obese  HENT:      Head: Normocephalic and atraumatic  Eyes:      Extraocular Movements: Extraocular movements intact  Pupils: Pupils are equal, round, and reactive to light     Cardiovascular:      Rate and Rhythm: Normal rate and regular rhythm  Pulses: Normal pulses  Heart sounds: Normal heart sounds  Pulmonary:      Effort: Pulmonary effort is normal       Breath sounds: Normal breath sounds  Abdominal:      General: Abdomen is flat  Palpations: Abdomen is soft  Musculoskeletal:         General: Swelling and tenderness present  Normal range of motion  Skin:     General: Skin is warm  Capillary Refill: Capillary refill takes 2 to 3 seconds  Findings: Bruising, erythema and lesion present  Comments: Large LLE venous stasis ulcer emcompassing 2/3rds of the circumference of his mid/distal shin  No active signs of infection   Neurological:      General: No focal deficit present  Mental Status: He is alert and oriented to person, place, and time  Psychiatric:         Mood and Affect: Mood normal          Behavior: Behavior normal          Thought Content:  Thought content normal          Judgment: Judgment normal        EVLT Operative Scheduling Information:    [facility], Patient requesting [facility], given weight needs to be in the main campus     Physician:  Me    Surgery: Left GSV EVLT     Urgency:  Urgent: ASAP    Level:  Level 2: Outpatients to be scheduled for surgery with time dependent medical necessity within 2 weeks    Case Length:  Normal    Post-op Bed:  Outpatient    OR Table:  Standard    Equipment Needs:  Vascular technologist    Medication Instructions:  None    Hydration:  No    Venous Clinical Severity Scores (VCSS)  Item Absent   (0 points) Mild   (1 point) Moderate   (2 points) Severe   (3 points)   Pain [] None [] Occasional [] Daily [x] Daily limiting   Varicose veins [] None [] Few [] Calf or thigh [x] Calf and thigh   Venous edema [] None [] Foot and ankle [] Above ankle, below knee [x] To knee of above   Skin pigmentation [] None [] Perimalleolar [] Diffuse, lower 1/3 calf [x] Wider, above lower 1/3 calf   Inflammation [] None [] Perimalleolar [] Diffuse, lower 1/3 calf [x] Wider, above lower 1/3 calf   Induration [] None [] Perimalleolar [] Diffuse, lower 1/3 calf [x] Wider, above lower 1/3 calf   No  active ulcers [] None [] 1 [] 2 [x] ? 3   Active ulcer size [] None [] <2 cm [] 2 - 6 cm [x] >6 cm   Ulcer duration [] None [] <3 months [x] 3 - 12 months [] >1 year   Compression therapy [] None [] Intermittent [] Most days [x] Fully comply   Total 29          CEAP Clinical Classification  [x] Symptomatic   [] Asymptomatic     [] Class 0 No visible or palpable signs of venous disease   [] Class 1 Telangiectasies or reticular veins   [] Class 2 Varicose veins; distinguished from reticular veins by a diameter of 3mm or more   [] Class 3 Edema   [] Class 4 Changes in skin and subcutaneous tissue secondary to CVD    [] Class 4a Pigmentation or eczema   [] Class 4b Lipodermatosclerosis or atrophie osbaldo   [] Class 5 Healed venous ulcer   [x] Class 6 Active venous ulcer

## 2021-07-26 NOTE — LETTER
07/28/21  RE: Member ID [id number]    To whom it may concern:    Patient, Bishop Ahmadi (1960), has an URGENT procedure scheduled on 08/12/21 at [facility] (Tax: [id number] / NPI: [id number]) [place] with Dr Jaz Rene (NPI: [id number])  We are requesting authorization for outpatient CPT code(s) 05517  Please review the attached clinical documentation and provide your determination  Please Note:  The left greater saphenous vein will be treated with CPT code 49318  Should you have any questions or concerns regarding the details of this case, please do not hesitate to reach out  Respectfully,      David Plata  Prior Authorization & Referral    Vascular Center  Direct Line: [phone]  Direct Fax: [phone]  [email or web address]  [email or web address]                                 Date: 07/28/21      Member Name and Telephone #: Bishop Ahmadi, [phone] [facility])  Member Medical Record Number: [id number]   Member ID: [id number]  Member YOB: 1960      Name:  David Plata  Contact Phone: [phone]   Contact Fax: [phone]    Requesting/Servicing Provider Physician Name, Address, NPI #: Jaz Rene (NPI: [id number]) - [address] [facility] [address], [address] [place] [address]  Requesting Provider Phone: [phone]   Requesting Provider Fax: [phone]     Facility/Location of Service Name, Address and NPI #: [facility] (Tax: [id number] / NPI: [id number]) - [address] [place] [address]   Facility Phone: [phone]  Facility Fax: [phone]    Specialty Vendor Name: Not Applicable (N/A)    Request Service Inpatient, Outpatient, or Other: Outpatient Surgical  Anticipated Date of Service/ Actual Date of Service: 08/12/21  Diagnosis Code(s): I83 029, L97 929  Procedure Code(s): 68896      Name & Phone Number of Person Submitting Request: David Plata, [phone]      In order to process this request supporting documentation must be attached for review  Precertification authorization verifies medical necessity criteria have been met and is not a guarantee of payment

## 2021-07-26 NOTE — TELEPHONE ENCOUNTER
Is patient requesting a call when authorization has been obtained? Patient did not request a call  Surgery Date: 8/12/21  Primary Surgeon: Reynold Collins (NPI: [id number])  Assisting Surgeon: Not Applicable (N/A)  Facility: [place] [id number] (Tax: [id number] / NPI: [id number])  Inpatient / Outpatient: Outpatient  Level: 2    Clearance Received: No clearance ordered  Consent Received: Yes, scanned into Epic on 7/26/21  Medication Hold / Last Dose: Not Applicable (N/A)  VQI Spreadsheet: Not Applicable (N/A)  IR Notified: Not Applicable (N/A)  Rep  Notified: Not Applicable (N/A)  Equipment Needs: Not Applicable (N/A)  Vas Lab Requested: 7/26/21  Patient Contacted: 7/26/21    Diagnosis: I83 029, L97 929  Procedure/ CPT Code(s): (EVLT) Endovenous Laser Treatment of the left upper leg // CPT: 88051    For varicose vein related procedures, last LEVDR? Yes, patient's LEVDR was completed within 6-months of their procedure date  Post Operative Date/ Time: To Be Determined (TBD) Working on doppler appt  Pt will have his blood work done at [place] [address]

## 2021-07-26 NOTE — ASSESSMENT & PLAN NOTE
Mr Homero Flores is a 64yo male with morbid obesity, HTN, depression, left leg chronic venous stasis with venous HTN and chronic venous ulcers with worsening wounds with associated cellulitis and bleeding from varicosities  Has been undergoing wound care with mild improvement for the past 6 weeks  Underwent reflux study which demonstrates superficial and deep venous insufficiency of the Left Lower extremity  Given extent and duration of wounds I feel he would benefit from Left GSV EVLT (ablation) to decrease underlying venous reflux to assist with wound healing  He will need ongoing wound care and compression therapy long term given underlying deep venous insufficiency  I have spent 30 minutes with Patient and family today in which greater than 50% of this time was spent in counseling/coordination of care regarding Diagnostic results, Prognosis, Risks and benefits of tx options, Intructions for management, Patient and family education, Importance of tx compliance, Risk factor reductions and Impressions

## 2021-07-26 NOTE — H&P (VIEW-ONLY)
Assessment/Plan:    Venous ulcer of left lower extremity with varicose veins ([facility] [address] )     Mr Alexia Owen is a 66yo male with morbid obesity, HTN, depression, left leg chronic venous stasis with venous HTN and chronic venous ulcers with worsening wounds with associated cellulitis and bleeding from varicosities  Has been undergoing wound care with mild improvement for the past 6 weeks  Underwent reflux study which demonstrates superficial and deep venous insufficiency of the Left Lower extremity  Given extent and duration of wounds I feel he would benefit from Left GSV EVLT (ablation) to decrease underlying venous reflux to assist with wound healing  He will need ongoing wound care and compression therapy long term given underlying deep venous insufficiency  I have spent 30 minutes with Patient and family today in which greater than 50% of this time was spent in counseling/coordination of care regarding Diagnostic results, Prognosis, Risks and benefits of tx options, Intructions for management, Patient and family education, Importance of tx compliance, Risk factor reductions and Impressions  Problem List Items Addressed This Visit        Cardiovascular and Mediastinum    Venous ulcer of left lower extremity with varicose veins ([facility] ) - Primary        Mr Alexia Owen is a 66yo male with morbid obesity, HTN, depression, left leg chronic venous stasis with venous HTN and chronic venous ulcers with worsening wounds with associated cellulitis and bleeding from varicosities  Has been undergoing wound care with mild improvement for the past 6 weeks  Underwent reflux study which demonstrates superficial and deep venous insufficiency of the Left Lower extremity  Given extent and duration of wounds I feel he would benefit from Left GSV EVLT (ablation) to decrease underlying venous reflux to assist with wound healing   He will need ongoing wound care and compression therapy long term given underlying deep venous insufficiency  I have spent 30 minutes with Patient and family today in which greater than 50% of this time was spent in counseling/coordination of care regarding Diagnostic results, Prognosis, Risks and benefits of tx options, Intructions for management, Patient and family education, Importance of tx compliance, Risk factor reductions and Impressions  Relevant Orders    Case request operating room: ENDOVASCULAR LASER THERAPY (EVLT), Left GSV (Completed)            Subjective:      Patient ID: Bishop Ahmadi is a 62 y o  male  Patient w/ LLE venous ulcer x 4 years off and on  Presents today for evaluation  Reflux study done 6/29  Patient also sees wound care every 2 weeks      The following portions of the patient's history were reviewed and updated as appropriate: allergies, current medications, past family history, past medical history, past social history, past surgical history and problem list     Review of Systems   Constitutional: Negative  HENT: Negative  Eyes: Negative  Respiratory: Positive for shortness of breath  Cardiovascular: Positive for leg swelling  Gastrointestinal: Negative  Endocrine: Negative  Genitourinary: Negative  Musculoskeletal: Negative  Skin: Positive for color change  ulcer   Allergic/Immunologic: Negative  Neurological: Negative  Hematological: Negative  Psychiatric/Behavioral: Negative  I have reviewed and updated the ROS as appropriate  Objective:      /84 (BP Location: Left arm, Patient Position: Sitting)   Pulse 74   Temp 98 1 °F (36 7 °C) (Tympanic)   Ht 5' 10" (1 778 m)   Wt (!) 170 kg (375 lb)   BMI 53 81 kg/m²          Physical Exam  Constitutional:       Appearance: He is obese  HENT:      Head: Normocephalic and atraumatic  Eyes:      Extraocular Movements: Extraocular movements intact  Pupils: Pupils are equal, round, and reactive to light     Cardiovascular:      Rate and Rhythm: Normal rate and regular rhythm  Pulses: Normal pulses  Heart sounds: Normal heart sounds  Pulmonary:      Effort: Pulmonary effort is normal       Breath sounds: Normal breath sounds  Abdominal:      General: Abdomen is flat  Palpations: Abdomen is soft  Musculoskeletal:         General: Swelling and tenderness present  Normal range of motion  Skin:     General: Skin is warm  Capillary Refill: Capillary refill takes 2 to 3 seconds  Findings: Bruising, erythema and lesion present  Comments: Large LLE venous stasis ulcer emcompassing 2/3rds of the circumference of his mid/distal shin  No active signs of infection   Neurological:      General: No focal deficit present  Mental Status: He is alert and oriented to person, place, and time  Psychiatric:         Mood and Affect: Mood normal          Behavior: Behavior normal          Thought Content:  Thought content normal          Judgment: Judgment normal        EVLT Operative Scheduling Information:    Hospital:  [facility], Patient requesting [facility], given weight needs to be in the main campus     Physician:  Me    Surgery: Left GSV EVLT     Urgency:  Urgent: ASAP    Level:  Level 2: Outpatients to be scheduled for surgery with time dependent medical necessity within 2 weeks    Case Length:  Normal    Post-op Bed:  Outpatient    OR Table:  Standard    Equipment Needs:  Vascular technologist    Medication Instructions:  None    Hydration:  No    Venous Clinical Severity Scores (VCSS)  Item Absent   (0 points) Mild   (1 point) Moderate   (2 points) Severe   (3 points)   Pain [] None [] Occasional [] Daily [x] Daily limiting   Varicose veins [] None [] Few [] Calf or thigh [x] Calf and thigh   Venous edema [] None [] Foot and ankle [] Above ankle, below knee [x] To knee of above   Skin pigmentation [] None [] Perimalleolar [] Diffuse, lower 1/3 calf [x] Wider, above lower 1/3 calf   Inflammation [] None [] Perimalleolar [] Diffuse, lower 1/3 calf [x] Wider, above lower 1/3 calf   Induration [] None [] Perimalleolar [] Diffuse, lower 1/3 calf [x] Wider, above lower 1/3 calf   No  active ulcers [] None [] 1 [] 2 [x] ? 3   Active ulcer size [] None [] <2 cm [] 2 - 6 cm [x] >6 cm   Ulcer duration [] None [] <3 months [x] 3 - 12 months [] >1 year   Compression therapy [] None [] Intermittent [] Most days [x] Fully comply   Total 29          CEAP Clinical Classification  [x] Symptomatic   [] Asymptomatic     [] Class 0 No visible or palpable signs of venous disease   [] Class 1 Telangiectasies or reticular veins   [] Class 2 Varicose veins; distinguished from reticular veins by a diameter of 3mm or more   [] Class 3 Edema   [] Class 4 Changes in skin and subcutaneous tissue secondary to CVD    [] Class 4a Pigmentation or eczema   [] Class 4b Lipodermatosclerosis or atrophie osbaldo   [] Class 5 Healed venous ulcer   [x] Class 6 Active venous ulcer

## 2021-07-26 NOTE — TELEPHONE ENCOUNTER
REMINDER: Under Reason For Call, comments MUST be formatted as:   (Surgeon's Initials) / (Procedure)    Physician / Roland Beltran: Galen Lopez (NPI: [id number]) Db Sarathor (Tax: [id number] / NPI: [id number])  Silvia Snyder, Patient requesting [facility], given weight needs to be in the main campus     Procedure:  Left GSV EVLT     Level: 2 - Route clearance(s) to [facility] Clearance Pool     Equipment / Rep Needs: Yes, Vascular technologist     Assistant Surgeon: No    Allergies: Patient has no known allergies  Instructions Given: EVLT Packet with NO Bowel Prep General Instructions     Blood Thinners / Medication Hold: Patient is not taking any blood thinners  Hydration Required: Patient does not require hydration  Dialysis: Patient is not on dialysis  Consent: I certify that patient has signed, printed, timed, and dated their surgery consent  I certify that BOTH sides of the completed surgery consent have been scanned into the patient's Epic chart by myself on 7/26/2021  Yes, I have LABELED the consent in Epic as Consent for Vascular Procedure  Clearances     Levels   1-3 ROUTE this encounter to [facility] Clearance Pool   AND   SEND Clearance Form(s) to Vascular Nursing e-mail group   Level   4 ROUTE this encounter to [facility] Surgery Coordinator Pool  AND   SEND Clearance Form(s) to Vascular Surgery Schedulers e-mail group     Patient does not require any pre operative clearance  Yes, I have ROUTED this encounter to [facility] Surgery Coordinator and/or [facility] Clearance Pool

## 2021-07-28 NOTE — TELEPHONE ENCOUNTER
Authorization requirements reviewed  Please refer to Guerda Perales / Jeannie Sinha number [id number] for case updates

## 2021-07-30 ENCOUNTER — OFFICE VISIT (OUTPATIENT)
Dept: WOUND CARE | Facility: CLINIC | Age: 61
End: 2021-07-30
Payer: COMMERCIAL

## 2021-07-30 VITALS
RESPIRATION RATE: 18 BRPM | HEART RATE: 72 BPM | SYSTOLIC BLOOD PRESSURE: 182 MMHG | TEMPERATURE: 98 F | DIASTOLIC BLOOD PRESSURE: 111 MMHG

## 2021-07-30 DIAGNOSIS — I87.332 CHRONIC VENOUS HYPERTENSION (IDIOPATHIC) WITH ULCER AND INFLAMMATION OF LEFT LOWER EXTREMITY (CODE) (HCC): ICD-10-CM

## 2021-07-30 DIAGNOSIS — E66.01 MORBID OBESITY WITH BMI OF 50.0-59.9, ADULT (HCC): Primary | ICD-10-CM

## 2021-07-30 DIAGNOSIS — L97.228 NON-PRESSURE CHRONIC ULCER OF LEFT CALF WITH OTHER SPECIFIED SEVERITY (HCC): ICD-10-CM

## 2021-07-30 PROCEDURE — 99213 OFFICE O/P EST LOW 20 MIN: CPT | Performed by: PREVENTIVE MEDICINE

## 2021-07-30 PROCEDURE — 99214 OFFICE O/P EST MOD 30 MIN: CPT | Performed by: PREVENTIVE MEDICINE

## 2021-07-30 RX ORDER — OXYCODONE HYDROCHLORIDE 5 MG/1
5 TABLET ORAL EVERY 6 HOURS PRN
Qty: 30 TABLET | Refills: 0 | Status: SHIPPED | OUTPATIENT
Start: 2021-07-30 | End: 2021-09-03 | Stop reason: SDUPTHER

## 2021-07-30 RX ORDER — LIDOCAINE HYDROCHLORIDE 40 MG/ML
5 SOLUTION TOPICAL ONCE
Status: COMPLETED | OUTPATIENT
Start: 2021-07-30 | End: 2021-07-30

## 2021-07-30 RX ADMIN — LIDOCAINE HYDROCHLORIDE 5 ML: 40 SOLUTION TOPICAL at 09:20

## 2021-07-30 NOTE — PROGRESS NOTES
Patient ID: Marilou Rizzo is a 62 y o  male Date of Birth 1960       Chief Complaint   Patient presents with    Follow Up Wound Care Visit     left lower extremity ulcer       Allergies:  Patient has no known allergies  Diagnosis:  1  Morbid obesity with BMI of 50 0-59 9, adult ([facility] )    2  Chronic venous hypertension (idiopathic) with ulcer and inflammation of left lower extremity (CODE) ([facility])  -     lidocaine (XYLOCAINE) 4 % topical solution 5 mL  -     Wound cleansing and dressings; Future  -     Wound compression and edema control; Future  -     oxyCODONE (ROXICODONE) 5 mg immediate release tablet; Take 1 tablet (5 mg total) by mouth every 6 (six) hours as needed for severe painMax Daily Amount: 20 mg    3  Non-pressure chronic ulcer of left calf with other specified severity ([place] [address] )       Diagnosis ICD-10-CM Associated Orders   1  Morbid obesity with BMI of 50 0-59 9, adult ([place] [address] )  E66 01     Z68 43    2  Chronic venous hypertension (idiopathic) with ulcer and inflammation of left lower extremity (CODE) ([facility])  I87 332 lidocaine (XYLOCAINE) 4 % topical solution 5 mL     Wound cleansing and dressings     Wound compression and edema control     oxyCODONE (ROXICODONE) 5 mg immediate release tablet   3  Non-pressure chronic ulcer of left calf with other specified severity ([place] [address] )  L97 228         Assessment & Plan:  See wound orders  Continue current care with hydrofera blue and surepress for compression  Reviewed vascular note, scheduled for EVLT  Still a lot of pain, refilled rx to use sparingly, I did refer to PM and they refused consult  F/u 3 weeks after EVLT  Subjective:   F/u LLE ulcer   No new complaints      The following portions of the patient's history were reviewed and updated as appropriate:   Patient Active Problem List   Diagnosis    Venous ulcer of left lower extremity with varicose veins ([facility])    Acute blood loss anemia    Bleeding from varicose vein    Obesity    Essential hypertension    Chronic venous hypertension (idiopathic) with ulcer and inflammation of left lower extremity (CODE) ([facility])     Past Medical History:   Diagnosis Date    Anxiety     Hypertension     Venous stasis ulcers of both lower extremities ([address] )      No past surgical history on file  Social History     Socioeconomic History    Marital status: /Civil Union     Spouse name: None    Number of children: None    Years of education: None    Highest education level: None   Occupational History    None   Tobacco Use    Smoking status: Never Smoker    Smokeless tobacco: Never Used   Substance and Sexual Activity    Alcohol use: Yes     Alcohol/week: 5 0 standard drinks     Types: 3 Cans of beer, 2 Shots of liquor per week    Drug use: Never    Sexual activity: None   Other Topics Concern    None   Social History Narrative    None     Social Determinants of Health     Financial Resource Strain:     Difficulty of Paying Living Expenses:    Food Insecurity:     Worried About Running Out of Food in the Last Year:     Ran Out of Food in the Last Year:    Transportation Needs:     Lack of Transportation (Medical):      Lack of Transportation (Non-Medical):    Physical Activity:     Days of Exercise per Week:     Minutes of Exercise per Session:    Stress:     Feeling of Stress :    Social Connections:     Frequency of Communication with Friends and Family:     Frequency of Social Gatherings with Friends and Family:     Attends [employer] Services:     Active Member of Clubs or Organizations:     Attends Club or Organization Meetings:     Marital Status:    Intimate Partner Violence:     Fear of Current or Ex-Partner:     Emotionally Abused:     Physically Abused:     Sexually Abused:         Current Outpatient Medications:     fexofenadine (ALLEGRA) 180 MG tablet, Take 1 tablet by mouth daily, Disp: , Rfl:     lisinopril (ZESTRIL) 20 mg tablet, , Disp: , Rfl:     metoprolol succinate (TOPROL-XL) 100 mg 24 hr tablet, , Disp: , Rfl:     naproxen sodium (ALEVE) 220 MG tablet, Take 1 tablet by mouth every 8 (eight) hours, Disp: , Rfl:     oxyCODONE (ROXICODONE) 5 mg immediate release tablet, Take 1 tablet (5 mg total) by mouth every 6 (six) hours as needed for severe painMax Daily Amount: 20 mg, Disp: 30 tablet, Rfl: 0    Santyl ointment, , Disp: , Rfl:     sertraline (ZOLOFT) 100 mg tablet, , Disp: , Rfl:   No current facility-administered medications for this visit  No family history on file  Review of Systems   Constitutional: Negative  Respiratory: Negative  Cardiovascular: Positive for leg swelling  Skin: Positive for wound  Objective:  BP (!) 182/111   Pulse 72   Temp 98 °F (36 7 °C)   Resp 18     Physical Exam  Vitals reviewed  Constitutional:       General: He is not in acute distress  Appearance: He is obese  Cardiovascular:      Rate and Rhythm: Normal rate  Pulmonary:      Effort: Pulmonary effort is normal    Musculoskeletal:      Left lower leg: Edema present  Skin:     Comments: See wound assessment   Neurological:      General: No focal deficit present  Mental Status: He is alert and oriented to person, place, and time  Psychiatric:         Mood and Affect: Mood normal          Behavior: Behavior normal              Wound 06/11/21 Venous Ulcer Pretibial Left (Active)   Wound Image    07/30/21 0915   Wound Description Pink;Slough; Epithelialization 07/30/21 0915   Hiu-wound Assessment Fragile; Maceration; Hyperpigmented 07/30/21 0915   Wound Length (cm) 15 8 cm 07/30/21 0915   Wound Width (cm) 30 2 cm 07/30/21 0915   Wound Depth (cm) 0 2 cm 07/30/21 0915   Wound Surface Area (cm^2) 477 16 cm^2 07/30/21 0915   Wound Volume (cm^3) 95 432 cm^3 07/30/21 0915   Calculated Wound Volume (cm^3) 95 43 cm^3 07/30/21 0915   Change in Wound Size % 13 37 07/30/21 0915   Drainage Amount Large 07/30/21 0915   Drainage Description Serosanguineous 07/30/21 0915   Non-staged Wound Description Full thickness 07/30/21 0915   Dressing Status Intact 07/09/21 0919                         Procedures     Results from last 6 Months   Lab Units 06/06/21  0849   WOUND CULTURE  3+ Growth of Escherichia coli*  3+ Growth of Proteus mirabilis*  3+ Growth of Pseudomonas aeruginosa*  1+ Growth of          Wound Instructions:  Orders Placed This Encounter   Procedures    Wound cleansing and dressings     Wound cleansing and dressings                                        Left lower leg:     Wash your hands with soap and water  Remove old dressing, discard into plastic bag and place in trash  Cleanse the wound with mild unscented dove soap and water prior to applying a clean dressing  Dakins wash done today at wound care center  Do not use tissue or cotton balls  Do not scrub the wound  Pat dry using gauze      Ok to shower on dressing change days      Apply a barrier cream around the wound, only apply to intact skin  Calmoseptine applied but desatin or anything with zinc will protect the intact skin  You can buy this over the counter      Apply hydraferra blue to the wound bed, cover with absorbant blue pad, ABD as necessary, rolled gauze and secure tape  Aply with a sure press for compression      Change dressing every 2 days (wife to do dressing changes)     This was done today at the wound care center  Walking will help  When seated keep legs elevated  Avoid standing in 1 place for long periods of time  When seated it is important to keep legs elevated (above the heart if possible)     Weight loss will also help aid in healing   Please try to eat 3-4 servings of protein per day (protein must be the main focus of your meals for weight loss and healing)               Standing Status:   Future     Standing Expiration Date:   7/30/2022    Wound compression and edema control     Wound compression and edema control      Surepress     Apply compression wrap to your affected Leg(s) from mid-foot to knee making sure to cover the heel  Apply in the morning and re-wrap as needed during the day if wrap becomes loose  Remove at bedtime and elevate legs or lie down      Avoid prolonged standing in one place      Elevate leg(s) above the level of the heart when sitting or as much as possible  Standing Status:   Future     Standing Expiration Date:   7/30/2022         Josette Cabot, DO      Portions of the record may have been created with voice recognition software  Occasional wrong word or "sound a like" substitutions may have occurred due to the inherent limitations of voice recognition software  Read the chart carefully and recognize, using context, where substitutions have occurred

## 2021-08-09 ENCOUNTER — APPOINTMENT (OUTPATIENT)
Dept: LAB | Facility: CLINIC | Age: 61
End: 2021-08-09
Payer: COMMERCIAL

## 2021-08-09 DIAGNOSIS — I83.029 VENOUS ULCER OF LEFT LOWER EXTREMITY WITH VARICOSE VEINS (HCC): ICD-10-CM

## 2021-08-09 DIAGNOSIS — L97.929 VENOUS ULCER OF LEFT LOWER EXTREMITY WITH VARICOSE VEINS (HCC): ICD-10-CM

## 2021-08-09 LAB
ANION GAP SERPL CALCULATED.3IONS-SCNC: 6 MMOL/L (ref 4–13)
BUN SERPL-MCNC: 20 MG/DL (ref 5–25)
CALCIUM SERPL-MCNC: 9.5 MG/DL (ref 8.3–10.1)
CHLORIDE SERPL-SCNC: 109 MMOL/L (ref 100–108)
CO2 SERPL-SCNC: 24 MMOL/L (ref 21–32)
CREAT SERPL-MCNC: 1.07 MG/DL (ref 0.6–1.3)
ERYTHROCYTE [DISTWIDTH] IN BLOOD BY AUTOMATED COUNT: 14.4 % (ref 11.6–15.1)
GFR SERPL CREATININE-BSD FRML MDRD: 76 ML/MIN/1.73SQ M
GLUCOSE P FAST SERPL-MCNC: 100 MG/DL (ref 65–99)
HCT VFR BLD AUTO: 38 % (ref 36.5–49.3)
HGB BLD-MCNC: 11.1 G/DL (ref 12–17)
INR PPP: 1 (ref 0.84–1.19)
MCH RBC QN AUTO: 24.6 PG (ref 26.8–34.3)
MCHC RBC AUTO-ENTMCNC: 29.2 G/DL (ref 31.4–37.4)
MCV RBC AUTO: 84 FL (ref 82–98)
PLATELET # BLD AUTO: 330 THOUSANDS/UL (ref 149–390)
PMV BLD AUTO: 10.3 FL (ref 8.9–12.7)
POTASSIUM SERPL-SCNC: 5.5 MMOL/L (ref 3.5–5.3)
PROTHROMBIN TIME: 13.2 SECONDS (ref 11.6–14.5)
RBC # BLD AUTO: 4.51 MILLION/UL (ref 3.88–5.62)
SODIUM SERPL-SCNC: 139 MMOL/L (ref 136–145)
WBC # BLD AUTO: 7.85 THOUSAND/UL (ref 4.31–10.16)

## 2021-08-09 PROCEDURE — 85610 PROTHROMBIN TIME: CPT

## 2021-08-09 PROCEDURE — 36415 COLL VENOUS BLD VENIPUNCTURE: CPT

## 2021-08-09 PROCEDURE — 85027 COMPLETE CBC AUTOMATED: CPT

## 2021-08-09 PROCEDURE — 80048 BASIC METABOLIC PNL TOTAL CA: CPT

## 2021-08-09 NOTE — PRE-PROCEDURE INSTRUCTIONS
Pre-Surgery Instructions:   Medication Instructions    fexofenadine (ALLEGRA) 180 MG tablet Take as directed    lisinopril (ZESTRIL) 20 mg tablet No hold on medications as per surgeon    metoprolol succinate (TOPROL-XL) 100 mg 24 hr tablet Take as directed    naproxen sodium (ALEVE) 220 MG tablet PRN - No hold on medications as per surgeon    oxyCODONE (ROXICODONE) 5 mg immediate release tablet PRN    sertraline (ZOLOFT) 100 mg tablet Take as directed      My Surgical Experience    The following information was developed to assist you to prepare for your operation  What do I need to do before coming to the hospital?   Arrange for a responsible person to drive you to and from the hospital    Arrange care for your children at home  Children are not allowed in the recovery areas of the hospital   Plan to wear clothing that is easy to put on and take off  If you are having shoulder surgery, wear a shirt that buttons or zippers in the front  Bathing  o Shower the evening before and the morning of your surgery with an antibacterial soap  Please refer to the Pre Op Showering Instructions for Surgery Patients Sheet   o Remove nail polish and all body piercing jewelry  o Do not shave any body part for at least 24 hours before surgery-this includes face, arms, legs and upper body  Food  o Nothing to eat or drink after midnight the night before your surgery  This includes candy and chewing gum  o Exception: If your surgery is after 12:00pm (noon), you may have clear liquids such as 7-Up®, ginger ale, apple or cranberry juice, Jell-O®, water, or clear broth until 8:00 am  o Do not drink milk or juice with pulp on the morning before surgery  o Do not drink alcohol 24 hours before surgery  Medicine  o Follow instructions you received from your surgeon about which medicines you may take on the day of surgery  o If instructed to take medicine on the morning of surgery, take pills with just a small sip of water   Call your prescribing doctor for specific infroamtion on what to do if you take insulin    What should I bring to the hospital?    Bring:  [place]les or a walker, if you have them, for foot or knee surgery   A list of the daily medicines, vitamins, minerals, herbals and nutritional supplements you take  Include the dosages of medicines and the time you take them each day   Glasses, dentures or hearing aids   Minimal clothing; you will be wearing hospital sleepwear   Photo ID; required to verify your identity   If you have a Living Will or Power of , bring a copy of the documents   If you have an ostomy, bring an extra pouch and any supplies you use    Do not bring   Medicines or inhalers   Money, valuables or jewelry    What other information should I know about the day of surgery?  Notify your surgeons if you develop a cold, sore throat, cough, fever, rash or any other illness   Report to the Ambulatory Surgical/Same Day Surgery Unit   You will be instructed to stop at Registration only if you have not been pre-registered   Inform your  fi they do not stay that they will be asked by the staff to leave a phone number where they can be reached   Be available to be reached before surgery  In the event the operating room schedule changes, you may be asked to come in earlier or later than expected    *It is important to tell your doctor and others involved in your health care if you are taking or have been taking any non-prescription drugs, vitamins, minerals, herbals or other nutritional supplements   Any of these may interact with some food or medicines and cause a reaction

## 2021-08-10 ENCOUNTER — CLINICAL SUPPORT (OUTPATIENT)
Dept: URGENT CARE | Facility: CLINIC | Age: 61
End: 2021-08-10
Payer: COMMERCIAL

## 2021-08-10 ENCOUNTER — ANESTHESIA EVENT (OUTPATIENT)
Dept: PERIOP | Facility: HOSPITAL | Age: 61
End: 2021-08-10
Payer: COMMERCIAL

## 2021-08-10 DIAGNOSIS — E66.01 CLASS 3 SEVERE OBESITY DUE TO EXCESS CALORIES WITH BODY MASS INDEX (BMI) OF 50.0 TO 59.9 IN ADULT, UNSPECIFIED WHETHER SERIOUS COMORBIDITY PRESENT (HCC): ICD-10-CM

## 2021-08-10 PROCEDURE — 93005 ELECTROCARDIOGRAM TRACING: CPT

## 2021-08-12 ENCOUNTER — HOSPITAL ENCOUNTER (OUTPATIENT)
Dept: VASCULAR ULTRASOUND | Facility: HOSPITAL | Age: 61
Discharge: HOME | End: 2021-08-12
Payer: COMMERCIAL

## 2021-08-12 ENCOUNTER — HOSPITAL ENCOUNTER (OUTPATIENT)
Facility: HOSPITAL | Age: 61
Setting detail: OUTPATIENT SURGERY
Discharge: HOME | End: 2021-08-12
Attending: SURGERY | Admitting: SURGERY
Payer: COMMERCIAL

## 2021-08-12 ENCOUNTER — ANESTHESIA (OUTPATIENT)
Dept: PERIOP | Facility: HOSPITAL | Age: 61
End: 2021-08-12
Payer: COMMERCIAL

## 2021-08-12 VITALS
RESPIRATION RATE: 20 BRPM | WEIGHT: 315 LBS | DIASTOLIC BLOOD PRESSURE: 74 MMHG | SYSTOLIC BLOOD PRESSURE: 119 MMHG | BODY MASS INDEX: 45.1 KG/M2 | TEMPERATURE: 98 F | HEIGHT: 70 IN | OXYGEN SATURATION: 95 % | HEART RATE: 74 BPM

## 2021-08-12 DIAGNOSIS — E66.01 CLASS 3 SEVERE OBESITY DUE TO EXCESS CALORIES WITH BODY MASS INDEX (BMI) OF 50.0 TO 59.9 IN ADULT, UNSPECIFIED WHETHER SERIOUS COMORBIDITY PRESENT (HCC): Primary | ICD-10-CM

## 2021-08-12 DIAGNOSIS — I83.892 VARICOSE VEINS OF LEG WITH EDEMA, LEFT: ICD-10-CM

## 2021-08-12 PROCEDURE — 93005 ELECTROCARDIOGRAM TRACING: CPT

## 2021-08-12 PROCEDURE — C1769 GUIDE WIRE: HCPCS | Performed by: SURGERY

## 2021-08-12 PROCEDURE — 36478 ENDOVENOUS LASER 1ST VEIN: CPT | Performed by: SURGERY

## 2021-08-12 PROCEDURE — 93971 EXTREMITY STUDY: CPT

## 2021-08-12 RX ORDER — ONDANSETRON 2 MG/ML
4 INJECTION INTRAMUSCULAR; INTRAVENOUS EVERY 6 HOURS PRN
Status: DISCONTINUED | OUTPATIENT
Start: 2021-08-12 | End: 2021-08-12 | Stop reason: HOSPADM

## 2021-08-12 RX ORDER — FENTANYL CITRATE/PF 50 MCG/ML
50 SYRINGE (ML) INJECTION
Status: DISCONTINUED | OUTPATIENT
Start: 2021-08-12 | End: 2021-08-12 | Stop reason: HOSPADM

## 2021-08-12 RX ORDER — FENTANYL CITRATE 50 UG/ML
INJECTION, SOLUTION INTRAMUSCULAR; INTRAVENOUS AS NEEDED
Status: DISCONTINUED | OUTPATIENT
Start: 2021-08-12 | End: 2021-08-12

## 2021-08-12 RX ORDER — MIDAZOLAM HYDROCHLORIDE 2 MG/2ML
INJECTION, SOLUTION INTRAMUSCULAR; INTRAVENOUS AS NEEDED
Status: DISCONTINUED | OUTPATIENT
Start: 2021-08-12 | End: 2021-08-12

## 2021-08-12 RX ORDER — ACETAMINOPHEN 325 MG/1
650 TABLET ORAL EVERY 4 HOURS PRN
Status: DISCONTINUED | OUTPATIENT
Start: 2021-08-12 | End: 2021-08-12 | Stop reason: HOSPADM

## 2021-08-12 RX ORDER — SODIUM CHLORIDE, SODIUM LACTATE, POTASSIUM CHLORIDE, CALCIUM CHLORIDE 600; 310; 30; 20 MG/100ML; MG/100ML; MG/100ML; MG/100ML
INJECTION, SOLUTION INTRAVENOUS CONTINUOUS PRN
Status: DISCONTINUED | OUTPATIENT
Start: 2021-08-12 | End: 2021-08-12

## 2021-08-12 RX ORDER — DEXAMETHASONE SODIUM PHOSPHATE 4 MG/ML
INJECTION, SOLUTION INTRA-ARTICULAR; INTRALESIONAL; INTRAMUSCULAR; INTRAVENOUS; SOFT TISSUE AS NEEDED
Status: DISCONTINUED | OUTPATIENT
Start: 2021-08-12 | End: 2021-08-12

## 2021-08-12 RX ORDER — SUCCINYLCHOLINE/SOD CL,ISO/PF 100 MG/5ML
SYRINGE (ML) INTRAVENOUS AS NEEDED
Status: DISCONTINUED | OUTPATIENT
Start: 2021-08-12 | End: 2021-08-12

## 2021-08-12 RX ORDER — CHLORHEXIDINE GLUCONATE 0.12 MG/ML
15 RINSE ORAL ONCE
Status: COMPLETED | OUTPATIENT
Start: 2021-08-12 | End: 2021-08-12

## 2021-08-12 RX ORDER — DIPHENHYDRAMINE HYDROCHLORIDE 50 MG/ML
12.5 INJECTION INTRAMUSCULAR; INTRAVENOUS ONCE AS NEEDED
Status: DISCONTINUED | OUTPATIENT
Start: 2021-08-12 | End: 2021-08-12 | Stop reason: HOSPADM

## 2021-08-12 RX ORDER — LIDOCAINE HYDROCHLORIDE 10 MG/ML
INJECTION, SOLUTION EPIDURAL; INFILTRATION; INTRACAUDAL; PERINEURAL AS NEEDED
Status: DISCONTINUED | OUTPATIENT
Start: 2021-08-12 | End: 2021-08-12

## 2021-08-12 RX ORDER — MORPHINE SULFATE 4 MG/ML
2 INJECTION, SOLUTION INTRAMUSCULAR; INTRAVENOUS
Status: DISCONTINUED | OUTPATIENT
Start: 2021-08-12 | End: 2021-08-12 | Stop reason: HOSPADM

## 2021-08-12 RX ORDER — PROPOFOL 10 MG/ML
INJECTION, EMULSION INTRAVENOUS AS NEEDED
Status: DISCONTINUED | OUTPATIENT
Start: 2021-08-12 | End: 2021-08-12

## 2021-08-12 RX ORDER — SODIUM CHLORIDE, SODIUM LACTATE, POTASSIUM CHLORIDE, CALCIUM CHLORIDE 600; 310; 30; 20 MG/100ML; MG/100ML; MG/100ML; MG/100ML
125 INJECTION, SOLUTION INTRAVENOUS CONTINUOUS
Status: DISCONTINUED | OUTPATIENT
Start: 2021-08-12 | End: 2021-08-12 | Stop reason: HOSPADM

## 2021-08-12 RX ORDER — GLYCOPYRROLATE 0.2 MG/ML
INJECTION INTRAMUSCULAR; INTRAVENOUS AS NEEDED
Status: DISCONTINUED | OUTPATIENT
Start: 2021-08-12 | End: 2021-08-12

## 2021-08-12 RX ORDER — ONDANSETRON 2 MG/ML
4 INJECTION INTRAMUSCULAR; INTRAVENOUS ONCE AS NEEDED
Status: DISCONTINUED | OUTPATIENT
Start: 2021-08-12 | End: 2021-08-12 | Stop reason: HOSPADM

## 2021-08-12 RX ORDER — ONDANSETRON 2 MG/ML
INJECTION INTRAMUSCULAR; INTRAVENOUS AS NEEDED
Status: DISCONTINUED | OUTPATIENT
Start: 2021-08-12 | End: 2021-08-12

## 2021-08-12 RX ORDER — LIDOCAINE HYDROCHLORIDE 10 MG/ML
INJECTION, SOLUTION EPIDURAL; INFILTRATION; INTRACAUDAL; PERINEURAL AS NEEDED
Status: DISCONTINUED | OUTPATIENT
Start: 2021-08-12 | End: 2021-08-12 | Stop reason: HOSPADM

## 2021-08-12 RX ORDER — CHLORHEXIDINE GLUCONATE 0.12 MG/ML
15 RINSE ORAL EVERY 12 HOURS SCHEDULED
Status: DISCONTINUED | OUTPATIENT
Start: 2021-08-12 | End: 2021-08-12 | Stop reason: SDUPTHER

## 2021-08-12 RX ADMIN — ONDANSETRON 4 MG: 2 INJECTION INTRAMUSCULAR; INTRAVENOUS at 12:08

## 2021-08-12 RX ADMIN — MIDAZOLAM HYDROCHLORIDE 2 MG: 1 INJECTION, SOLUTION INTRAMUSCULAR; INTRAVENOUS at 11:30

## 2021-08-12 RX ADMIN — LIDOCAINE HYDROCHLORIDE 100 MG: 10 INJECTION, SOLUTION EPIDURAL; INFILTRATION; INTRACAUDAL; PERINEURAL at 12:00

## 2021-08-12 RX ADMIN — GLYCOPYRROLATE 0.2 MG: 0.2 INJECTION, SOLUTION INTRAMUSCULAR; INTRAVENOUS at 11:30

## 2021-08-12 RX ADMIN — CEFAZOLIN SODIUM 3000 MG: 1 INJECTION, POWDER, FOR SOLUTION INTRAMUSCULAR; INTRAVENOUS at 11:07

## 2021-08-12 RX ADMIN — SODIUM CHLORIDE, SODIUM LACTATE, POTASSIUM CHLORIDE, AND CALCIUM CHLORIDE: .6; .31; .03; .02 INJECTION, SOLUTION INTRAVENOUS at 11:20

## 2021-08-12 RX ADMIN — PROPOFOL 400 MG: 10 INJECTION, EMULSION INTRAVENOUS at 12:00

## 2021-08-12 RX ADMIN — Medication 200 MG: at 12:00

## 2021-08-12 RX ADMIN — CHLORHEXIDINE GLUCONATE 15 ML: 1.2 SOLUTION ORAL at 10:49

## 2021-08-12 RX ADMIN — FENTANYL CITRATE 100 MCG: 50 INJECTION, SOLUTION INTRAMUSCULAR; INTRAVENOUS at 11:59

## 2021-08-12 RX ADMIN — DEXAMETHASONE SODIUM PHOSPHATE 4 MG: 4 INJECTION, SOLUTION INTRAMUSCULAR; INTRAVENOUS at 12:09

## 2021-08-12 RX ADMIN — SODIUM CHLORIDE, SODIUM LACTATE, POTASSIUM CHLORIDE, AND CALCIUM CHLORIDE 125 ML/HR: .6; .31; .03; .02 INJECTION, SOLUTION INTRAVENOUS at 10:51

## 2021-08-12 RX ADMIN — ACETAMINOPHEN 650 MG: 325 TABLET, FILM COATED ORAL at 14:23

## 2021-08-12 NOTE — ANESTHESIA POSTPROCEDURE EVALUATION
Post-Op Assessment Note    CV Status:  Stable  Pain Score: 0    Pain management: adequate     Mental Status:  Alert   Hydration Status:  Stable   PONV Controlled:  None   Airway Patency:  Patent   Two or more mitigation strategies used for obstructive sleep apnea   Post Op Vitals Reviewed: Yes      Staff: Anesthesiologist         No complications documented      BP (P) 133/74 (08/12/21 1300)    Temp (!) (P) 97 4 °F (36 3 °C) (08/12/21 1300)    Pulse (P) 79 (08/12/21 1300)   Resp (P) 16 (08/12/21 1300)    SpO2 (P) 98 % (08/12/21 1300)

## 2021-08-12 NOTE — DISCHARGE INSTRUCTIONS
Endovenous Ablation   WHAT YOU NEED TO KNOW:   Endovenous ablation is a procedure that uses radiofrequency or laser energy to treat varicose veins  Varicose veins are large, twisted veins in your legs that bulge out under your skin  Endovenous ablation may help treat pain, discolored skin, or ulcers in your leg that are caused by varicose veins  DISCHARGE INSTRUCTIONS:   Call your local emergency number (911 in the [address]) for any of the following:   · You feel lightheaded, short of breath, and have chest pain  · You cough up blood  · You have trouble breathing  Seek care immediately if:   · Blood soaks through your bandage  · Your leg feels warm, tender and painful  · Any part of your leg is numb or pale to the touch  Call your doctor if:   · You have a fever or chills  · Your wound is red, swollen, or draining pus  · You have nausea or are vomiting  · Your skin is itchy, swollen, or you have a rash  · You have questions or concerns about your condition or care  Medicines: You may need any of the following:  · Prescription pain medicine  may be given  Ask your healthcare provider how to take this medicine safely  Some prescription pain medicines contain acetaminophen  Do not take other medicines that contain acetaminophen without talking to your healthcare provider  Too much acetaminophen may cause liver damage  Prescription pain medicine may cause constipation  Ask your healthcare provider how to prevent or treat constipation  · NSAIDs , such as ibuprofen, help decrease swelling, pain, and fever  NSAIDs can cause stomach bleeding or kidney problems in certain people  If you take blood thinner medicine, always ask your healthcare provider if NSAIDs are safe for you  Always read the medicine label and follow directions  · Take your medicine as directed  Contact your healthcare provider if you think your medicine is not helping or if you have side effects   Tell him or her if you are allergic to any medicine  Keep a list of the medicines, vitamins, and herbs you take  Include the amounts, and when and why you take them  Bring the list or the pill bottles to follow-up visits  Carry your medicine list with you in case of an emergency  Care for your procedure area as directed:  Carefully wash the area with soap and water  Dry the area and put on new, clean bandages as directed  Change your bandages when they get wet or dirty  Check for signs of infection such as redness, swelling, or pus  Self-care:   · Stay active  Do not spend too much time sitting or standing  If you need to stand, shift your weight from leg to leg often  Take short walks throughout the day  Walking will improve blood flow and prevent blood clots in your leg  Ask your healthcare provider how much activity you need to do every day  · Do not cross your legs when you sit  This decreases blood flow to your feet and can make your symptoms worse  · Wear compression stockings or bandages as directed  The stockings and bandages are snug and put pressure on your legs  This improves blood flow and helps prevent clots  · Apply a warm compress as directed  A warm compress can be a small towel or washcloth  Moisten it in warm (not hot) water  Apply the warm compress to your leg  A warm compress may help with discomfort from injury to your vein during the procedure  · Elevate your leg  Keep your leg above the level of your heart as often as possible  This will help decrease swelling and pain  Prop your leg on pillows or blankets to keep it elevated comfortably  · Do not wear tight clothing or shoes  Do not wear high-heeled shoes  Do not wear clothes that are tight around the waist or knees  Follow up with your doctor as directed:  Write down your questions so you remember to ask them during your visits    © Copyright [employer] 2021 Information is for End User's use only and may not be sold, redistributed or otherwise used for commercial purposes  All illustrations and images included in CareNotes® are the copyrighted property of [employer]  or [employer]  The above information is an  only  It is not intended as medical advice for individual conditions or treatments  Talk to your doctor, nurse or pharmacist before following any medical regimen to see if it is safe and effective for you

## 2021-08-12 NOTE — ANESTHESIA PREPROCEDURE EVALUATION
Procedure:  ENDOVASCULAR LASER THERAPY (EVLT), Left GSV (Left Leg Upper)    Relevant Problems   CARDIO   (+) Essential hypertension      HEMATOLOGY   (+) Acute blood loss anemia        Physical Exam    Airway    Mallampati score: III  TM Distance: >3 FB  Neck ROM: full     Dental   No notable dental hx     Cardiovascular  Rhythm: regular, Rate: normal, No murmur, Cardiovascular exam normal    Pulmonary  Pulmonary exam normal Breath sounds clear to auscultation,     Other Findings        Anesthesia Plan  ASA Score- 3     Anesthesia Type- general with ASA Monitors  Additional Monitors:   Airway Plan: ETT  Plan Factors-Exercise tolerance (METS): >4 METS  Chart reviewed  Existing labs reviewed  Patient is not a current smoker  Patient instructed to abstain from smoking on day of procedure  Patient did not smoke on day of surgery  Obstructive sleep apnea risk education given perioperatively  Induction- intravenous and rapid sequence induction  Postoperative Plan- Plan for postoperative opioid use  Planned trial extubation    Informed Consent- Anesthetic plan and risks discussed with patient  I personally reviewed this patient with the CRNA  Discussed and agreed on the Anesthesia Plan with the CRNA  Jung Graff

## 2021-08-12 NOTE — INTERVAL H&P NOTE
H&P reviewed  After examining the patient I find no changes in the patients condition since the H&P had been written      Vitals:    08/12/21 1040   BP: 131/70   Pulse: 66   Resp: 21   Temp: 98 5 °F (36 9 °C)   SpO2: 98%     Plan: Left GSV EVLT

## 2021-08-12 NOTE — OP NOTE
OPERATIVE REPORT  PATIENT NAME: Thomas Sweet    :  1960  MRN: [id number]  Pt Location: [facility] OR ROOM 02    SURGERY DATE: 2021    Surgeon(s) and Role:     Joseph Vega MD - Primary    Preop Diagnosis:  Venous ulcer of left lower extremity with varicose veins ([facility] ) [I83 029, L97 929]    Post-Op Diagnosis Codes: * Venous ulcer of left lower extremity with varicose veins ([facility] ) [I83 029, L97 929]    Procedure(s) (LRB):  ENDOVASCULAR LASER THERAPY (EVLT), Left GSV (Left)    Specimen(s):  * No specimens in log *    Estimated Blood Loss:   Minimal    Drains:  * No LDAs found *    Anesthesia Type:   General    Operative Indications:  Venous ulcer of left lower extremity with varicose veins ([facility] ) [I83 029, L97 929]      Operative Findings:  none    Complications:   None    Procedure and Technique:    The patient Thomas Sweet was identified in the operating room after adequate laryngeal mask anesthesia was obtained the Left leg was prepped and draped using Betadine prep and Sterile drapes  Under ultrasound guidance saphenous vein was identified, micropuncture needle wire and catheter were placed, J-wire to the saphenofemoral junction followed by the laser sheath  The laser was engaged at 7 W of power for proximally 169 seconds ([id number])  Completion duplex imaging showed wide patency of the common femoral vein with no evidence of deep vein thrombosis  The saphenous vein was obliterated along its course  Quarter-inch Steri-Strips were placed and a sterile compressive dressing was applied Estimated blood loss was minimal  The patient was taken to the recovery room in stable condition    A qualified resident physician was not available    I was present for the entire procedure    Patient Disposition:  PACU     SIGNATURE: Maryjo Mac MD  DATE: 2021  TIME: 12:44 PM

## 2021-08-13 PROCEDURE — NC001 PR NO CHARGE: Performed by: SURGERY

## 2021-08-15 LAB
ATRIAL RATE: 67 BPM
ATRIAL RATE: 86 BPM
P AXIS: 39 DEGREES
P AXIS: 50 DEGREES
PR INTERVAL: 172 MS
PR INTERVAL: 174 MS
QRS AXIS: 30 DEGREES
QRS AXIS: 38 DEGREES
QRSD INTERVAL: 88 MS
QRSD INTERVAL: 88 MS
QT INTERVAL: 354 MS
QT INTERVAL: 392 MS
QTC INTERVAL: 414 MS
QTC INTERVAL: 423 MS
T WAVE AXIS: 33 DEGREES
T WAVE AXIS: 62 DEGREES
VENTRICULAR RATE: 67 BPM
VENTRICULAR RATE: 86 BPM

## 2021-08-15 PROCEDURE — 93010 ELECTROCARDIOGRAM REPORT: CPT | Performed by: INTERNAL MEDICINE

## 2021-08-16 ENCOUNTER — OFFICE VISIT (OUTPATIENT)
Dept: VASCULAR SURGERY | Facility: CLINIC | Age: 61
End: 2021-08-16
Payer: COMMERCIAL

## 2021-08-16 ENCOUNTER — HOSPITAL ENCOUNTER (OUTPATIENT)
Dept: VASCULAR ULTRASOUND | Facility: HOSPITAL | Age: 61
Discharge: HOME | End: 2021-08-16
Payer: COMMERCIAL

## 2021-08-16 VITALS
TEMPERATURE: 97 F | BODY MASS INDEX: 45.1 KG/M2 | HEART RATE: 80 BPM | WEIGHT: 315 LBS | HEIGHT: 70 IN | DIASTOLIC BLOOD PRESSURE: 84 MMHG | SYSTOLIC BLOOD PRESSURE: 140 MMHG

## 2021-08-16 DIAGNOSIS — I83.029 VENOUS ULCER OF LEFT LOWER EXTREMITY WITH VARICOSE VEINS (HCC): Primary | ICD-10-CM

## 2021-08-16 DIAGNOSIS — L97.929 VENOUS ULCER OF LEFT LOWER EXTREMITY WITH VARICOSE VEINS (HCC): ICD-10-CM

## 2021-08-16 DIAGNOSIS — L97.929 VENOUS ULCER OF LEFT LOWER EXTREMITY WITH VARICOSE VEINS (HCC): Primary | ICD-10-CM

## 2021-08-16 DIAGNOSIS — I83.029 VENOUS ULCER OF LEFT LOWER EXTREMITY WITH VARICOSE VEINS (HCC): ICD-10-CM

## 2021-08-16 PROCEDURE — 99212 OFFICE O/P EST SF 10 MIN: CPT | Performed by: NURSE PRACTITIONER

## 2021-08-16 PROCEDURE — 93971 EXTREMITY STUDY: CPT

## 2021-08-16 NOTE — PATIENT INSTRUCTIONS
-  Operative dressing removed in the office today without incident   -initiate postop incisional care as discussed and outlined  Clean insertions site daily with soap and water  No soaking or submerging incisions x 4 week  No lotions, ointments or creams to incisions x 4 weeks  Leave Steri-Strips in place, until fall off on own   -resume use of compression stockings when tolerated  Compression and elevation PRN for symptomatic relief and edema control  - venous duplex today  -return to office in 4-6 week with surgeon  - continue with wound care as planned  -instructed to contact the office w/new symptoms or changes in incisions

## 2021-08-16 NOTE — PROGRESS NOTES
Assessment/Plan:    Venous ulcer of left lower extremity with varicose veins (HCC)  Patient is a 64-year-old morbidly obese male with history of HTN, LLE chronic venous stasis with venous hypertension and ulceration who presents the office s/p E [facility] 08/12/2021 Renetta Ritchie for postop follow-up  Patient is without complaints  Post op dressings removed prior to appointment  Left medial knee knee/thigh insertion site CDI, no bleeding drainage or hematoma  Minimal ecchymosis noted to left thigh  LLE lower leg venous ulcer wound care dressing in place  + PP    Plan:  - post EVLT LEV today  - clean insertion site daily with soap and water  Steri-Strips in place, allowed to fall off naturally  - daily use of medical grade compression for symptom management  - leg elevation at rest   - return to office 4-6 weeks with surgeon  Evaluate venous ulceration progression   - continue wound care as ordered  - call or return to the office with new or worsening symptoms  Diagnoses and all orders for this visit:    Venous ulcer of left lower extremity with varicose veins (HCC)        Subjective:      Patient ID: Lori Rodrigues is a 62 y o  male  Patient is s/p L GSV EVLT on 8/12/21, Dr Ritesh Gonzalez, presents today for post-op visit and dressing removal      Patient is a 64-year-old morbidly obese male with history of HTN, LLE chronic venous stasis with venous hypertension and ulceration who presents the office s/p [facility] 08/12/2021 Renetta Ritchie for postop follow-up  Patient is without complaints  Post op dressings removed prior to appointment  Left medial knee knee/thigh insertion site CDI, no bleeding drainage or hematoma  Minimal ecchymosis noted to left thigh  Patient denies pain, fever, or chills  LLE lower leg venous ulcer wound care dressing in place  + PP left DP  Patient will follow-up with post EVLT limited LEV today    Instructed to keep insertion site clean, wash daily with soap and water, leave Steri-Strips in place allow to follow-up naturally  Continue with compression as tolerated for symptom management  Patient continues with wound care, and q 3 days dressing changes at home  Patient and wife report wound bed is improving, pink/red  No improvement in size of ulceration  Plan to return to the office in 4-6 weeks with surgeon to evaluate progression of venous ulceration healing and make further recommendations  The following portions of the patient's history were reviewed and updated as appropriate: allergies, current medications, past family history, past medical history, past social history, past surgical history and problem list     Review of Systems   Constitutional: Negative  HENT: Negative  Eyes: Negative  Respiratory: Positive for apnea and shortness of breath  Cardiovascular: Positive for leg swelling  Gastrointestinal: Negative  Endocrine: Negative  Genitourinary: Negative  Musculoskeletal: Positive for gait problem  Skin: Positive for color change and wound  Allergic/Immunologic: Negative  Hematological: Negative  Psychiatric/Behavioral: Negative  I have reviewed and made appropriate changes to the review of systems input by the medical assistant  Objective:      /84 (BP Location: Left arm, Patient Position: Sitting)   Pulse 80   Temp (!) 97 °F (36 1 °C) (Tympanic)   Ht 5' 10" (1 778 m)   Wt (!) 168 kg (370 lb)   BMI 53 09 kg/m²        Physical Exam  Vitals reviewed  Constitutional:       Appearance: Normal appearance  He is obese  Cardiovascular:      Rate and Rhythm: Normal rate and regular rhythm  Pulses: Normal pulses  Femoral pulses are 2+ on the left side  Dorsalis pedis pulses are 2+ on the right side and 2+ on the left side  Heart sounds: Normal heart sounds  Pulmonary:      Effort: Pulmonary effort is normal       Breath sounds: Normal breath sounds     Musculoskeletal:         General: Normal range of motion  Right lower leg: Edema present  Left lower leg: Edema present  Skin:     General: Skin is warm and dry  Comments: S/p EVLT LLE medial knee/ thigh insertion site CDI with steri stips  No bleeding, drainage or hematoma  Minimal ecchymosis noted    LLE wrapped with dressing intact from wound care (venous ulcer)   Neurological:      General: No focal deficit present  Mental Status: He is alert and oriented to person, place, and time  Psychiatric:         Mood and Affect: Mood normal          Behavior: Behavior normal              Vitals:    08/16/21 0930   BP: 140/84   BP Location: Left arm   Patient Position: Sitting   Pulse: 80   Temp: (!) 97 °F (36 1 °C)   TempSrc: Tympanic   Weight: (!) 168 kg (370 lb)   Height: 5' 10" (1 778 m)       Patient Active Problem List   Diagnosis    Venous ulcer of left lower extremity with varicose veins (HCC)    Acute blood loss anemia    Bleeding from varicose vein    Obesity    Essential hypertension    Chronic venous hypertension (idiopathic) with ulcer and inflammation of left lower extremity (CODE) (HCC)       Past Surgical History:   Procedure Laterality Date    HERNIA REPAIR      umbilical    [place] ENDOVENOUS LASER, 1ST VEIN Left 8/12/2021    Procedure: ENDOVASCULAR LASER THERAPY (EVLT), Left GSV;  Surgeon: Tyesha Sylvester MD;  Location: [facility];  Service: Vascular    TONSILLECTOMY         History reviewed  No pertinent family history  Social History     Socioeconomic History    Marital status: /Civil Union     Spouse name: Not on file    Number of children: Not on file    Years of education: Not on file    Highest education level: Not on file   Occupational History    Not on file   Tobacco Use    Smoking status: Never Smoker    Smokeless tobacco: Never Used   Vaping Use    Vaping Use: Never used   Substance and Sexual Activity    Alcohol use:  Yes     Alcohol/week: 3 0 standard drinks     Types: 2 Cans of beer, 1 Shots of liquor per week     Comment: daily    Drug use: Never    Sexual activity: Not on file   Other Topics Concern    Not on file   Social History Narrative    Not on file     Social Determinants of Health     Financial Resource Strain:     Difficulty of Paying Living Expenses:    Food Insecurity:     Worried About Running Out of Food in the Last Year:     [address] in the Last Year:    Transportation Needs:     Lack of Transportation (Medical):  Lack of Transportation (Non-Medical):    Physical Activity:     Days of Exercise per Week:     Minutes of Exercise per Session:    Stress:     Feeling of Stress :    Social Connections:     Frequency of Communication with Friends and Family:     Frequency of Social Gatherings with Friends and Family:     Attends [employer] Services:     Active Member of Clubs or Organizations:     Attends Club or Organization Meetings:     Marital Status:    Intimate Partner Violence:     Fear of Current or Ex-Partner:     Emotionally Abused:     Physically Abused:     Sexually Abused:         Allergies   Allergen Reactions    Dilaudid [Hydromorphone] Other (See Comments)     hypotension         Current Outpatient Medications:     fexofenadine (ALLEGRA) 180 MG tablet, Take 1 tablet by mouth daily, Disp: , Rfl:     lisinopril (ZESTRIL) 20 mg tablet, , Disp: , Rfl:     metoprolol succinate (TOPROL-XL) 100 mg 24 hr tablet, , Disp: , Rfl:     naproxen sodium (ALEVE) 220 MG tablet, Take 1 tablet by mouth every 8 (eight) hours, Disp: , Rfl:     oxyCODONE (ROXICODONE) 5 mg immediate release tablet, Take 1 tablet (5 mg total) by mouth every 6 (six) hours as needed for severe painMax Daily Amount: 20 mg, Disp: 30 tablet, Rfl: 0    sertraline (ZOLOFT) 100 mg tablet, , Disp: , Rfl:

## 2021-08-16 NOTE — ASSESSMENT & PLAN NOTE
Patient is a 61-year-old morbidly obese male with history of HTN, LLE chronic venous stasis with venous hypertension and ulceration who presents the office s/p LLE [facility] 08/12/2021 Arsenio Mejia) for postop follow-up  Patient is without complaints  Post op dressings removed prior to appointment  Left medial knee knee/thigh insertion site CDI, no bleeding drainage or hematoma  Minimal ecchymosis noted to left thigh  LLE lower leg venous ulcer wound care dressing in place  + PP    Plan:  - post EVLT LEV today  - clean insertion site daily with soap and water  Steri-Strips in place, allowed to fall off naturally  - daily use of medical grade compression for symptom management  - leg elevation at rest   - return to office 4-6 weeks with surgeon  Evaluate venous ulceration progression   - continue wound care as ordered  - call or return to the office with new or worsening symptoms

## 2021-08-20 ENCOUNTER — OFFICE VISIT (OUTPATIENT)
Dept: WOUND CARE | Facility: CLINIC | Age: 61
End: 2021-08-20
Payer: COMMERCIAL

## 2021-08-20 VITALS
HEART RATE: 77 BPM | RESPIRATION RATE: 18 BRPM | DIASTOLIC BLOOD PRESSURE: 92 MMHG | TEMPERATURE: 96.9 F | SYSTOLIC BLOOD PRESSURE: 188 MMHG

## 2021-08-20 DIAGNOSIS — L97.228 NON-PRESSURE CHRONIC ULCER OF LEFT CALF WITH OTHER SPECIFIED SEVERITY (HCC): ICD-10-CM

## 2021-08-20 DIAGNOSIS — E66.01 MORBID OBESITY WITH BMI OF 50.0-59.9, ADULT (HCC): ICD-10-CM

## 2021-08-20 DIAGNOSIS — I87.332 CHRONIC VENOUS HYPERTENSION (IDIOPATHIC) WITH ULCER AND INFLAMMATION OF LEFT LOWER EXTREMITY (CODE) (HCC): Primary | ICD-10-CM

## 2021-08-20 PROCEDURE — 99213 OFFICE O/P EST LOW 20 MIN: CPT | Performed by: PREVENTIVE MEDICINE

## 2021-08-20 PROCEDURE — 99214 OFFICE O/P EST MOD 30 MIN: CPT | Performed by: PREVENTIVE MEDICINE

## 2021-08-20 RX ORDER — LEVOFLOXACIN 500 MG/1
500 TABLET, FILM COATED ORAL EVERY 24 HOURS
Qty: 10 TABLET | Refills: 0 | Status: SHIPPED | OUTPATIENT
Start: 2021-08-20 | End: 2021-08-30

## 2021-08-20 RX ORDER — LIDOCAINE HYDROCHLORIDE 40 MG/ML
5 SOLUTION TOPICAL ONCE
Status: COMPLETED | OUTPATIENT
Start: 2021-08-20 | End: 2021-08-20

## 2021-08-20 RX ADMIN — LIDOCAINE HYDROCHLORIDE 5 ML: 40 SOLUTION TOPICAL at 09:21

## 2021-08-20 NOTE — PROGRESS NOTES
Patient ID: Marion Irwin is a 62 y o  male Date of Birth 1960       Chief Complaint   Patient presents with    Follow Up Wound Care Visit     dressing intact       Allergies:  Dilaudid [hydromorphone]    Diagnosis:  1  Chronic venous hypertension (idiopathic) with ulcer and inflammation of left lower extremity (CODE) ([facility])  -     Wound cleansing and dressings; Future  -     Wound compression and edema control; Future  -     lidocaine (XYLOCAINE) 4 % topical solution 5 mL  -     levofloxacin (LEVAQUIN) 500 mg tablet; Take 1 tablet (500 mg total) by mouth every 24 hours for 10 days    2  Non-pressure chronic ulcer of left calf with other specified severity ([facility] )  -     Wound cleansing and dressings; Future  -     Wound compression and edema control; Future  -     lidocaine (XYLOCAINE) 4 % topical solution 5 mL  -     levofloxacin (LEVAQUIN) 500 mg tablet; Take 1 tablet (500 mg total) by mouth every 24 hours for 10 days    3  Morbid obesity with BMI of 50 0-59 9, adult ([place] [address] )       Diagnosis ICD-10-CM Associated Orders   1  Chronic venous hypertension (idiopathic) with ulcer and inflammation of left lower extremity (CODE) ([facility])  I87 332 Wound cleansing and dressings     Wound compression and edema control     lidocaine (XYLOCAINE) 4 % topical solution 5 mL     levofloxacin (LEVAQUIN) 500 mg tablet   2  Non-pressure chronic ulcer of left calf with other specified severity ([facility])  L97 228 Wound cleansing and dressings     Wound compression and edema control     lidocaine (XYLOCAINE) 4 % topical solution 5 mL     levofloxacin (LEVAQUIN) 500 mg tablet   3  Morbid obesity with BMI of 50 0-59 9, adult ([place] [address] )  E66 01     Z68 43         Assessment & Plan:  See wound orders  S/p EVLT on 8/12  Based off of prior culture, start levaquin  Switch to polymem to ulcer, otherwise continue current care and recs    Subjective:   F/u LLE ulcer   Feels like pain is slightly improved      The following portions of the patient's history were reviewed and updated as appropriate:   Patient Active Problem List   Diagnosis    Venous ulcer of left lower extremity with varicose veins (HCC)    Acute blood loss anemia    Bleeding from varicose vein    Obesity    Essential hypertension    Chronic venous hypertension (idiopathic) with ulcer and inflammation of left lower extremity (CODE) (HCC)     Past Medical History:   Diagnosis Date    Anxiety     CPAP (continuous positive airway pressure) dependence     Hypertension     Sleep apnea     Venous stasis ulcers of both lower extremities (HCC)      Past Surgical History:   Procedure Laterality Date    HERNIA REPAIR      umbilical    [place] ENDOVENOUS LASER, 1ST VEIN Left 8/12/2021    Procedure: ENDOVASCULAR LASER THERAPY (EVLT), Left GSV;  Surgeon: Hussain Tavarez MD;  Location: [facility];  Service: Vascular    TONSILLECTOMY       Social History     Socioeconomic History    Marital status: /Civil Union     Spouse name: None    Number of children: None    Years of education: None    Highest education level: None   Occupational History    None   Tobacco Use    Smoking status: Never Smoker    Smokeless tobacco: Never Used   Vaping Use    Vaping Use: Never used   Substance and Sexual Activity    Alcohol use: Yes     Alcohol/week: 3 0 standard drinks     Types: 2 Cans of beer, 1 Shots of liquor per week     Comment: daily    Drug use: Never    Sexual activity: None   Other Topics Concern    None   Social History Narrative    None     Social Determinants of Health     Financial Resource Strain:     Difficulty of Paying Living Expenses:    Food Insecurity:     Worried About Running Out of Food in the Last Year:     Ran Out of Food in the Last Year:    Transportation Needs:     Lack of Transportation (Medical):      Lack of Transportation (Non-Medical):    Physical Activity:     Days of Exercise per Week:     Minutes of Exercise per Session:    Stress:     Feeling of Stress :    Social Connections:     Frequency of Communication with Friends and Family:     Frequency of Social Gatherings with Friends and Family:     Attends [employer] Services:     Active Member of Clubs or Organizations:     Attends Club or Organization Meetings:     Marital Status:    Intimate Partner Violence:     Fear of Current or Ex-Partner:     Emotionally Abused:     Physically Abused:     Sexually Abused:         Current Outpatient Medications:     fexofenadine (ALLEGRA) 180 MG tablet, Take 1 tablet by mouth daily, Disp: , Rfl:     levofloxacin (LEVAQUIN) 500 mg tablet, Take 1 tablet (500 mg total) by mouth every 24 hours for 10 days, Disp: 10 tablet, Rfl: 0    lisinopril (ZESTRIL) 20 mg tablet, , Disp: , Rfl:     metoprolol succinate (TOPROL-XL) 100 mg 24 hr tablet, , Disp: , Rfl:     naproxen sodium (ALEVE) 220 MG tablet, Take 1 tablet by mouth every 8 (eight) hours, Disp: , Rfl:     oxyCODONE (ROXICODONE) 5 mg immediate release tablet, Take 1 tablet (5 mg total) by mouth every 6 (six) hours as needed for severe painMax Daily Amount: 20 mg, Disp: 30 tablet, Rfl: 0    sertraline (ZOLOFT) 100 mg tablet, , Disp: , Rfl:   No current facility-administered medications for this visit  No family history on file  Review of Systems   Constitutional: Negative  Respiratory: Negative  Cardiovascular: Positive for leg swelling  Skin: Positive for wound  Objective:  BP (!) 188/92   Pulse 77   Temp (!) 96 9 °F (36 1 °C)   Resp 18     Physical Exam  Vitals reviewed  Constitutional:       General: He is not in acute distress  Appearance: He is obese  Cardiovascular:      Rate and Rhythm: Normal rate  Pulmonary:      Effort: Pulmonary effort is normal    Musculoskeletal:      Left lower leg: Edema present  Skin:     Comments: See wound assessment   Neurological:      General: No focal deficit present        Mental Status: He is alert and oriented to person, place, and time    Psychiatric:         Mood and Affect: Mood normal          Behavior: Behavior normal              Wound 06/11/21 Venous Ulcer Pretibial Left (Active)   Wound Image    08/20/21 0914   Wound Description Pink;Slough; Epithelialization 08/20/21 0914   Hui-wound Assessment Fragile; Maceration; Hyperpigmented 08/20/21 0914   Wound Length (cm) 15 cm 08/20/21 0914   Wound Width (cm) 30 cm 08/20/21 0914   Wound Depth (cm) 0 2 cm 08/20/21 0914   Wound Surface Area (cm^2) 450 cm^2 08/20/21 0914   Wound Volume (cm^3) 90 cm^3 08/20/21 0914   Calculated Wound Volume (cm^3) 90 cm^3 08/20/21 0914   Change in Wound Size % 18 3 08/20/21 0914   Drainage Amount Large 08/20/21 0914   Drainage Description Serosanguineous 08/20/21 0914   Non-staged Wound Description Full thickness 08/20/21 0914   Treatments Irrigation with NSS 08/20/21 0914   Dressing Status Intact 07/09/21 0919                         Procedures     Results from last 6 Months   Lab Units 06/06/21  0849   WOUND CULTURE  3+ Growth of Escherichia coli*  3+ Growth of Proteus mirabilis*  3+ Growth of Pseudomonas aeruginosa*  1+ Growth of          Wound Instructions:  Orders Placed This Encounter   Procedures    Wound cleansing and dressings     Wound cleansing and dressings                                                                   Left lower leg:     Wash your hands with soap and water  Remove old dressing, discard into plastic bag and place in trash  Cleanse the wound with mild unscented dove soap and water prior to applying a clean dressing  Dakins wash done today at wound care center  Do not use tissue or cotton balls  Do not scrub the wound  Pat dry using gauze      Ok to shower on dressing change days      Apply a barrier cream around the wound, only apply to intact skin  Calmoseptine applied but desatin or anything with zinc will protect the intact skin   You can buy this over the counter      Apply Polymem to the wound bed, cover with absorbant blue pad, ABD as necessary, rolled gauze and secure tape  Aply with a sure press for compression      Change dressing every 2 days (wife to do dressing changes)     This was done today at the wound care center      Oral Antibiotic ordered           Walking will help  When seated keep legs elevated  Avoid standing in 1 place for long periods of time  When seated it is important to keep legs elevated (above the heart if possible)     Weight loss will also help aid in healing  Please try to eat 3-4 servings of protein per day (protein must be the main focus of your meals for weight loss and healing)        Standing Status:   Future     Standing Expiration Date:   8/20/2022    Wound compression and edema control     Wound compression and edema control                          Surepress     Apply compression wrap to your affected Leg(s) from mid-foot to knee making sure to cover the heel  Apply in the morning and re-wrap as needed during the day if wrap becomes loose  Remove at bedtime and elevate legs or lie down      Avoid prolonged standing in one place      Elevate leg(s) above the level of the heart when sitting or as much as possible     Standing Status:   Future     Standing Expiration Date:   8/20/2022         Quang Sanders DO      Portions of the record may have been created with voice recognition software  Occasional wrong word or "sound a like" substitutions may have occurred due to the inherent limitations of voice recognition software  Read the chart carefully and recognize, using context, where substitutions have occurred

## 2021-09-03 ENCOUNTER — OFFICE VISIT (OUTPATIENT)
Dept: WOUND CARE | Facility: CLINIC | Age: 61
End: 2021-09-03
Payer: COMMERCIAL

## 2021-09-03 VITALS
DIASTOLIC BLOOD PRESSURE: 77 MMHG | HEART RATE: 73 BPM | RESPIRATION RATE: 18 BRPM | SYSTOLIC BLOOD PRESSURE: 170 MMHG | TEMPERATURE: 97.1 F

## 2021-09-03 DIAGNOSIS — L97.228 NON-PRESSURE CHRONIC ULCER OF LEFT CALF WITH OTHER SPECIFIED SEVERITY (HCC): ICD-10-CM

## 2021-09-03 DIAGNOSIS — I87.332 CHRONIC VENOUS HYPERTENSION (IDIOPATHIC) WITH ULCER AND INFLAMMATION OF LEFT LOWER EXTREMITY (CODE) (HCC): Primary | ICD-10-CM

## 2021-09-03 DIAGNOSIS — E66.01 MORBID OBESITY WITH BMI OF 50.0-59.9, ADULT (HCC): ICD-10-CM

## 2021-09-03 PROCEDURE — 99214 OFFICE O/P EST MOD 30 MIN: CPT | Performed by: PREVENTIVE MEDICINE

## 2021-09-03 PROCEDURE — 99213 OFFICE O/P EST LOW 20 MIN: CPT | Performed by: PREVENTIVE MEDICINE

## 2021-09-03 RX ORDER — OXYCODONE HYDROCHLORIDE 5 MG/1
5 TABLET ORAL EVERY 6 HOURS PRN
Qty: 30 TABLET | Refills: 0 | Status: SHIPPED | OUTPATIENT
Start: 2021-09-03 | End: 2021-10-08 | Stop reason: SDUPTHER

## 2021-09-03 RX ORDER — LIDOCAINE HYDROCHLORIDE 40 MG/ML
5 SOLUTION TOPICAL ONCE
Status: COMPLETED | OUTPATIENT
Start: 2021-09-03 | End: 2021-09-03

## 2021-09-03 RX ADMIN — LIDOCAINE HYDROCHLORIDE 5 ML: 40 SOLUTION TOPICAL at 09:22

## 2021-09-03 NOTE — PROGRESS NOTES
Patient ID: Renetta Torres is a 62 y o  male Date of Birth 1960       Chief Complaint   Patient presents with    Follow Up Wound Care Visit     LLE wound       Allergies:  Dilaudid [hydromorphone]    Diagnosis:  1  Chronic venous hypertension (idiopathic) with ulcer and inflammation of left lower extremity (CODE) (Formerly [facility])  -     lidocaine (XYLOCAINE) 4 % topical solution 5 mL  -     Wound cleansing and dressings; Future  -     Wound compression and edema control; Future  -     oxyCODONE (ROXICODONE) 5 mg immediate release tablet; Take 1 tablet (5 mg total) by mouth every 6 (six) hours as needed for severe painMax Daily Amount: 20 mg    2  Non-pressure chronic ulcer of left calf with other specified severity ([facility] [address] )    3  Morbid obesity with BMI of 50 0-59 9, adult ([facility] )       Diagnosis ICD-10-CM Associated Orders   1  Chronic venous hypertension (idiopathic) with ulcer and inflammation of left lower extremity (CODE) (Formerly [facility])  I87 [id number] lidocaine (XYLOCAINE) 4 % topical solution 5 mL     Wound cleansing and dressings     Wound compression and edema control     oxyCODONE (ROXICODONE) 5 mg immediate release tablet   2  Non-pressure chronic ulcer of left calf with other specified severity ([facility] [address] )  L97 [id number]    3  Morbid obesity with BMI of 50 0-59 9, adult ([facility] )  E66 01     Z68 43         Assessment & Plan:  See wound orders  Much improved after levaquin  Continue current care and recs  Refilled oxycodone for limited use for severe pain  Subjective:   F/u LLE ulcer   Pain improved, less drainage      The following portions of the patient's history were reviewed and updated as appropriate:   Patient Active Problem List   Diagnosis    Venous ulcer of left lower extremity with varicose veins (HCC)    Acute blood loss anemia    Bleeding from varicose vein    Obesity    Essential hypertension    Chronic venous hypertension (idiopathic) with ulcer and inflammation of left lower extremity (CODE) ([facility] )     Past Medical History:   Diagnosis Date    Anxiety     CPAP (continuous positive airway pressure) dependence     Hypertension     Sleep apnea     Venous stasis ulcers of both lower extremities (HCC)      Past Surgical History:   Procedure Laterality Date    HERNIA REPAIR      umbilical    [place] ENDOVENOUS LASER, 1ST VEIN Left 8/12/2021    Procedure: ENDOVASCULAR LASER THERAPY (EVLT), Left GSV;  Surgeon: Joyce Gomez MD;  Location: [facility];  Service: Vascular    TONSILLECTOMY       Social History     Socioeconomic History    Marital status: /Civil Union     Spouse name: Not on file    Number of children: Not on file    Years of education: Not on file    Highest education level: Not on file   Occupational History    Not on file   Tobacco Use    Smoking status: Never Smoker    Smokeless tobacco: Never Used   Vaping Use    Vaping Use: Never used   Substance and Sexual Activity    Alcohol use: Yes     Alcohol/week: 3 0 standard drinks     Types: 2 Cans of beer, 1 Shots of liquor per week     Comment: daily    Drug use: Never    Sexual activity: Not on file   Other Topics Concern    Not on file   Social History Narrative    Not on file     Social Determinants of Health     Financial Resource Strain:     Difficulty of Paying Living Expenses:    Food Insecurity:     Worried About Running Out of Food in the Last Year:     [address] in the Last Year:    Transportation Needs:     Lack of Transportation (Medical):      Lack of Transportation (Non-Medical):    Physical Activity:     Days of Exercise per Week:     Minutes of Exercise per Session:    Stress:     Feeling of Stress :    Social Connections:     Frequency of Communication with Friends and Family:     Frequency of Social Gatherings with Friends and Family:     Attends Sikh Services:     Active Member of Clubs or Organizations:     Attends Club or Organization Meetings:     Marital Status:    Intimate Partner Violence:     Fear of Current or Ex-Partner:     Emotionally Abused:     Physically Abused:     Sexually Abused:         Current Outpatient Medications:     fexofenadine (ALLEGRA) 180 MG tablet, Take 1 tablet by mouth daily, Disp: , Rfl:     lisinopril (ZESTRIL) 20 mg tablet, , Disp: , Rfl:     metoprolol succinate (TOPROL-XL) 100 mg 24 hr tablet, , Disp: , Rfl:     naproxen sodium (ALEVE) 220 MG tablet, Take 1 tablet by mouth every 8 (eight) hours, Disp: , Rfl:     oxyCODONE (ROXICODONE) 5 mg immediate release tablet, Take 1 tablet (5 mg total) by mouth every 6 (six) hours as needed for severe painMax Daily Amount: 20 mg, Disp: 30 tablet, Rfl: 0    sertraline (ZOLOFT) 100 mg tablet, , Disp: , Rfl:   No current facility-administered medications for this visit  No family history on file  Review of Systems   Constitutional: Negative  Respiratory: Negative  Cardiovascular: Positive for leg swelling  Skin: Positive for wound  Objective:  /77   Pulse 73   Temp (!) 97 1 °F (36 2 °C)   Resp 18     Physical Exam  Vitals reviewed  Constitutional:       General: He is not in acute distress  Appearance: He is obese  Cardiovascular:      Rate and Rhythm: Normal rate  Pulmonary:      Effort: Pulmonary effort is normal    Musculoskeletal:      Left lower leg: Edema present  Skin:     Comments: See wound assessment   Neurological:      General: No focal deficit present  Mental Status: He is alert and oriented to person, place, and time  Psychiatric:         Mood and Affect: Mood normal          Behavior: Behavior normal              Wound 06/11/21 Venous Ulcer Pretibial Left (Active)   Wound Image    09/03/21 0919   Wound Description Pink;Slough; Epithelialization 09/03/21 0919   Hui-wound Assessment Fragile; Maceration; Hyperpigmented 09/03/21 0919   Wound Length (cm) 14 5 cm 09/03/21 0919   Wound Width (cm) 28 cm 09/03/21 0919   Wound Depth (cm) 0 2 cm 09/03/21 0919   Wound Surface Area (cm^2) 406 cm^2 09/03/21 0919   Wound Volume (cm^3) 81 2 cm^3 09/03/21 0919   Calculated Wound Volume (cm^3) 81 2 cm^3 09/03/21 0919   Change in Wound Size % 26 29 09/03/21 0919   Drainage Amount Large 09/03/21 0919   Drainage Description Serosanguineous 09/03/21 0919   Non-staged Wound Description Full thickness 09/03/21 0919   Treatments Irrigation with NSS 08/20/21 0914   Dressing Status Intact 09/03/21 0919                         Procedures     Results from last 6 Months   Lab Units 06/06/21  0849   WOUND CULTURE  3+ Growth of Escherichia coli*  3+ Growth of Proteus mirabilis*  3+ Growth of Pseudomonas aeruginosa*  1+ Growth of          Wound Instructions:  Orders Placed This Encounter   Procedures    Wound cleansing and dressings     Left lower leg:     Wash your hands with soap and water  Remove old dressing, discard into plastic bag and place in trash  Cleanse the wound with mild unscented dove soap and water prior to applying a clean dressing  Dakins wash done today at wound care center  Do not use tissue or cotton balls  Do not scrub the wound  Pat dry using gauze      Ok to shower on dressing change days      Apply a barrier cream around the wound, only apply to intact skin  Calmoseptine applied but desatin or anything with zinc will protect the intact skin  You can buy this over the counter      Apply Polymem (cut slits into polymem) to the wound bed, cover with absorbant blue pad, ABD as necessary, rolled gauze and secure tape  Apply with a sure press for compression      Change dressing every 2 days (wife to do dressing changes)     This was done today at the wound care center         Walking will help  When seated keep legs elevated  Avoid standing in 1 place for long periods of time  When seated it is important to keep legs elevated (above the heart if possible)     Weight loss will also help aid in healing   Please try to eat 3-4 servings of protein per day (protein must be the main focus of your meals for weight loss and healing)                             Standing Status:   Future     Standing Expiration Date:   9/3/2022    Wound compression and edema control     Surepress     Apply compression wrap to your affected Leg(s) from mid-foot to knee making sure to cover the heel  Apply in the morning and re-wrap as needed during the day if wrap becomes loose  Remove at bedtime and elevate legs or lie down      Avoid prolonged standing in one place      Elevate leg(s) above the level of the heart when sitting or as much as possible     Standing Status:   Future     Standing Expiration Date:   9/3/2022         Cherri Phoenix, DO      Portions of the record may have been created with voice recognition software  Occasional wrong word or "sound a like" substitutions may have occurred due to the inherent limitations of voice recognition software  Read the chart carefully and recognize, using context, where substitutions have occurred

## 2021-09-03 NOTE — PATIENT INSTRUCTIONS
Orders Placed This Encounter   Procedures    Wound cleansing and dressings     Left lower leg:     Wash your hands with soap and water  Remove old dressing, discard into plastic bag and place in trash  Cleanse the wound with mild unscented dove soap and water prior to applying a clean dressing  Dakins wash done today at wound care center  Do not use tissue or cotton balls  Do not scrub the wound  Pat dry using gauze      Ok to shower on dressing change days      Apply a barrier cream around the wound, only apply to intact skin  Calmoseptine applied but desatin or anything with zinc will protect the intact skin  You can buy this over the counter      Apply Polymem (cut slits into polymem) to the wound bed, cover with absorbant blue pad, ABD as necessary, rolled gauze and secure tape  Apply with a sure press for compression      Change dressing every 2 days (wife to do dressing changes)     This was done today at the wound care center         Walking will help  When seated keep legs elevated  Avoid standing in 1 place for long periods of time  When seated it is important to keep legs elevated (above the heart if possible)     Weight loss will also help aid in healing  Please try to eat 3-4 servings of protein per day (protein must be the main focus of your meals for weight loss and healing)                             Standing Status:   Future     Standing Expiration Date:   9/3/2022    Wound compression and edema control     Surepress     Apply compression wrap to your affected Leg(s) from mid-foot to knee making sure to cover the heel  Apply in the morning and re-wrap as needed during the day if wrap becomes loose   Remove at bedtime and elevate legs or lie down      Avoid prolonged standing in one place      Elevate leg(s) above the level of the heart when sitting or as much as possible     Standing Status:   Future     Standing Expiration Date:   9/3/2022

## 2021-09-13 ENCOUNTER — OFFICE VISIT (OUTPATIENT)
Dept: VASCULAR SURGERY | Facility: CLINIC | Age: 61
End: 2021-09-13
Payer: COMMERCIAL

## 2021-09-13 VITALS
HEIGHT: 70 IN | DIASTOLIC BLOOD PRESSURE: 94 MMHG | BODY MASS INDEX: 45.1 KG/M2 | WEIGHT: 315 LBS | SYSTOLIC BLOOD PRESSURE: 152 MMHG | RESPIRATION RATE: 18 BRPM | HEART RATE: 77 BPM

## 2021-09-13 DIAGNOSIS — L97.929 VENOUS ULCER OF LEFT LOWER EXTREMITY WITH VARICOSE VEINS (HCC): Primary | ICD-10-CM

## 2021-09-13 DIAGNOSIS — I87.332 CHRONIC VENOUS HYPERTENSION (IDIOPATHIC) WITH ULCER AND INFLAMMATION OF LEFT LOWER EXTREMITY (CODE) (HCC): ICD-10-CM

## 2021-09-13 DIAGNOSIS — I83.029 VENOUS ULCER OF LEFT LOWER EXTREMITY WITH VARICOSE VEINS (HCC): Primary | ICD-10-CM

## 2021-09-13 PROCEDURE — 99212 OFFICE O/P EST SF 10 MIN: CPT | Performed by: SURGERY

## 2021-09-13 NOTE — ASSESSMENT & PLAN NOTE
Mr Yue Olmedo is a 64yo male with morbid obesity, HTN, depression, left leg chronic venous stasis with venous HTN and chronic venous ulcers with worsening wounds with associated cellulitis and bleeding from varicosities  Has been undergoing wound care with mild improvement for the past 6 weeks       Underwent reflux study which demonstrates superficial and deep venous insufficiency of the Left Lower extremity  Given extent and duration of wounds I feel he would benefit from Left GSV EVLT (ablation) to decrease underlying venous reflux to assist with wound healing  He will need ongoing wound care and compression therapy long term given underlying deep venous insufficiency       Now s/p Left GSV EVLT ~4weeks ago, presents for follow-up  Reports positive improvement in the wound with decreased, pain and drainage, swelling and with active signs of healing  Continues with Dr Simone Farias for wound care  Recommend ongoing wound and compression therapy  Recommend compression stockings of the Right lower extremity given underlying reflux  Encourage weight loss     PRN clinic f/u

## 2021-09-13 NOTE — PROGRESS NOTES
Assessment/Plan:    Venous ulcer of left lower extremity with varicose veins [facility])  Mr Leon Gillespie is a 66yo male with morbid obesity, HTN, depression, left leg chronic venous stasis with venous HTN and chronic venous ulcers with worsening wounds with associated cellulitis and bleeding from varicosities  Has been undergoing wound care with mild improvement for the past 6 weeks       Underwent reflux study which demonstrates superficial and deep venous insufficiency of the Left Lower extremity  Given extent and duration of wounds I feel he would benefit from Left GSV EVLT (ablation) to decrease underlying venous reflux to assist with wound healing  He will need ongoing wound care and compression therapy long term given underlying deep venous insufficiency       Now s/p Left GSV EVLT ~4weeks ago, presents for follow-up  Reports positive improvement in the wound with decreased, pain and drainage, swelling and with active signs of healing  Continues with Dr Na Sellers for wound care  Recommend ongoing wound and compression therapy  Recommend compression stockings of the Right lower extremity given underlying reflux  Encourage weight loss  PRN clinic f/u          Problem List Items Addressed This Visit        Cardiovascular and Mediastinum    Venous ulcer of left lower extremity with varicose veins ([address] ) - Primary     Mr Leon Gillespie is a 66yo male with morbid obesity, HTN, depression, left leg chronic venous stasis with venous HTN and chronic venous ulcers with worsening wounds with associated cellulitis and bleeding from varicosities  Has been undergoing wound care with mild improvement for the past 6 weeks       Underwent reflux study which demonstrates superficial and deep venous insufficiency of the Left Lower extremity  Given extent and duration of wounds I feel he would benefit from Left GSV EVLT (ablation) to decrease underlying venous reflux to assist with wound healing   He will need ongoing wound care and compression therapy long term given underlying deep venous insufficiency       Now s/p Left GSV EVLT ~4weeks ago, presents for follow-up  Reports positive improvement in the wound with decreased, pain and drainage, swelling and with active signs of healing  Continues with Dr Marcie Staton for wound care  Recommend ongoing wound and compression therapy  Recommend compression stockings of the Right lower extremity given underlying reflux  Encourage weight loss  PRN clinic f/u          Chronic venous hypertension (idiopathic) with ulcer and inflammation of left lower extremity (CODE) (HCC)            Subjective:      Patient ID: Renetta Torres is a 62 y o  male  Patient is s/p Lt EVLT on 8/12/21 by Dr Socorro Davila  Pt has lessening in swelling and pain in the Lt leg  Pt has a LLE draining wound  Pt states that the draining has decreased and the wound is healing  Pt wears an ace wrap for compression and sees wound care every other week  Pt changes the dressing once to twice a day at home  Pt states the wound is getting better  The following portions of the patient's history were reviewed and updated as appropriate: allergies, current medications, past family history, past medical history, past social history, past surgical history and problem list     Review of Systems   Constitutional: Negative  HENT: Negative  Eyes: Negative  Respiratory: Negative  Cardiovascular: Positive for leg swelling  Gastrointestinal: Negative  Endocrine: Negative  Genitourinary: Negative  Musculoskeletal: Positive for arthralgias, back pain and gait problem  Skin: Positive for color change and wound  Allergic/Immunologic: Negative  Neurological: Positive for numbness  Hematological: Negative  Psychiatric/Behavioral: Negative  I have reviewed and updated the ROS as appropriate          Objective:      /94 (BP Location: Right arm, Patient Position: Sitting)   Pulse 77   Resp 18   Ht 5' 10" (1 778 m)   Wt (!) 170 kg (375 lb)   BMI 53 81 kg/m²          Physical Exam    Doing well  Alert and oriented  LE dressing not removed

## 2021-09-24 ENCOUNTER — OFFICE VISIT (OUTPATIENT)
Dept: WOUND CARE | Facility: CLINIC | Age: 61
End: 2021-09-24
Payer: COMMERCIAL

## 2021-09-24 VITALS
SYSTOLIC BLOOD PRESSURE: 127 MMHG | RESPIRATION RATE: 18 BRPM | DIASTOLIC BLOOD PRESSURE: 72 MMHG | TEMPERATURE: 97.4 F | HEART RATE: 74 BPM

## 2021-09-24 DIAGNOSIS — I87.332 CHRONIC VENOUS HYPERTENSION (IDIOPATHIC) WITH ULCER AND INFLAMMATION OF LEFT LOWER EXTREMITY (CODE) (HCC): Primary | ICD-10-CM

## 2021-09-24 DIAGNOSIS — E66.01 MORBID OBESITY WITH BMI OF 50.0-59.9, ADULT (HCC): ICD-10-CM

## 2021-09-24 PROBLEM — E66.9 OBESITY: Status: RESOLVED | Noted: 2021-06-05 | Resolved: 2021-09-24

## 2021-09-24 PROCEDURE — 99212 OFFICE O/P EST SF 10 MIN: CPT | Performed by: PREVENTIVE MEDICINE

## 2021-09-24 PROCEDURE — 99214 OFFICE O/P EST MOD 30 MIN: CPT | Performed by: PREVENTIVE MEDICINE

## 2021-09-24 RX ORDER — LIDOCAINE HYDROCHLORIDE 40 MG/ML
5 SOLUTION TOPICAL ONCE
Status: COMPLETED | OUTPATIENT
Start: 2021-09-24 | End: 2021-09-24

## 2021-09-24 RX ORDER — LEVOFLOXACIN 500 MG/1
500 TABLET, FILM COATED ORAL EVERY 24 HOURS
Qty: 10 TABLET | Refills: 0 | Status: SHIPPED | OUTPATIENT
Start: 2021-09-24 | End: 2021-10-04

## 2021-09-24 RX ADMIN — LIDOCAINE HYDROCHLORIDE 5 ML: 40 SOLUTION TOPICAL at 10:07

## 2021-09-24 NOTE — PATIENT INSTRUCTIONS
Orders Placed This Encounter   Procedures    Wound compression and edema control      Wound compression and edema control      Surepress     Apply compression wrap to your affected Leg(s) from mid-foot to knee making sure to cover the heel  Apply in the morning and re-wrap as needed during the day if wrap becomes loose  Remove at bedtime and elevate legs or lie down      Avoid prolonged standing in one place      Elevate leg(s) above the level of the heart when sitting or as much as possible     Standing Status:   Future     Standing Expiration Date:   9/24/2022    Wound cleansing and dressings     Wound cleansing and dressings       Left lower leg:     Wash your hands with soap and water  Remove old dressing, discard into plastic bag and place in trash  Cleanse the wound with the prophase  prior to applying a clean dressing  Do not use tissue or cotton balls  Do not scrub the wound  Pat dry using gauze      Ok to shower on dressing change days      Apply a barrier cream around the wound, only apply to intact skin  Calmoseptine applied but desatin or anything with zinc will protect the intact skin  You can buy this over the counter      Apply Polymem (cut slits into polymem) to the wound bed, cover with absorbant blue pad, ABD as necessary, rolled gauze and secure tape  Apply with a sure press for compression      Change dressing every 2 days (wife to do dressing changes)     This was done today at the wound care center      Antibiotics ordered today, please  at your pharmacy     Walking will help  When seated keep legs elevated  Avoid standing in 1 place for long periods of time  When seated it is important to keep legs elevated (above the heart if possible)     Weight loss will also help aid in healing   Please try to eat 3-4 servings of protein per day (protein must be the main focus of your meals for weight loss and healing)                                                            Standing Status:   Future     Standing Expiration Date:   9/24/2022

## 2021-09-24 NOTE — PROGRESS NOTES
Patient ID: Yenifer Langley is a 62 y o  male Date of Birth 1960       Chief Complaint   Patient presents with    Follow Up Wound Care Visit     left lower extremity leg       Allergies:  Dilaudid [hydromorphone]    Diagnosis:  1  Chronic venous hypertension (idiopathic) with ulcer and inflammation of left lower extremity (CODE) ([facility])  -     lidocaine (XYLOCAINE) 4 % topical solution 5 mL  -     Wound compression and edema control; Future  -     Wound cleansing and dressings; Future  -     levofloxacin (LEVAQUIN) 500 mg tablet; Take 1 tablet (500 mg total) by mouth every 24 hours for 10 days    2  Morbid obesity with BMI of 50 0-59 9, adult ([facility] )       Diagnosis ICD-10-CM Associated Orders   1  Chronic venous hypertension (idiopathic) with ulcer and inflammation of left lower extremity (CODE) ([facility])  I87 332 lidocaine (XYLOCAINE) 4 % topical solution 5 mL     Wound compression and edema control     Wound cleansing and dressings     levofloxacin (LEVAQUIN) 500 mg tablet   2  Morbid obesity with BMI of 50 0-59 9, adult ([facility])  E66 01     Z68 43         Assessment & Plan:  Increased pain, drainage and malodor  Repeat course of levaquin ordered, (completed prior course on 8/30/21)  Continue current care and recs  See wound orders  F/u in 2 weeks  Subjective:   F/u LLE ulcer  Pt still reports pain and drainage         The following portions of the patient's history were reviewed and updated as appropriate:   Patient Active Problem List   Diagnosis    Venous ulcer of left lower extremity with varicose veins ([facility])    Acute blood loss anemia    Bleeding from varicose vein    Essential hypertension    Chronic venous hypertension (idiopathic) with ulcer and inflammation of left lower extremity (CODE) ([facility] )    Morbid obesity with BMI of 50 0-59 9, adult ([facility] )     Past Medical History:   Diagnosis Date    Anxiety     CPAP (continuous positive airway pressure) dependence     Hypertension     Sleep apnea     Venous stasis ulcers of both lower extremities (HCC)      Past Surgical History:   Procedure Laterality Date    HERNIA REPAIR      umbilical    [facility] ENDOVENOUS LASER, 1ST VEIN Left 8/12/2021    Procedure: ENDOVASCULAR LASER THERAPY (EVLT), Left GSV;  Surgeon: Florecita Chung MD;  Location: [facility];  Service: Vascular    TONSILLECTOMY       Social History     Socioeconomic History    Marital status: /Civil Union     Spouse name: None    Number of children: None    Years of education: None    Highest education level: None   Occupational History    None   Tobacco Use    Smoking status: Never Smoker    Smokeless tobacco: Never Used   Vaping Use    Vaping Use: Never used   Substance and Sexual Activity    Alcohol use: Yes     Alcohol/week: 3 0 standard drinks     Types: 2 Cans of beer, 1 Shots of liquor per week     Comment: daily    Drug use: Never    Sexual activity: None   Other Topics Concern    None   Social History Narrative    None     Social Determinants of Health     Financial Resource Strain:     Difficulty of Paying Living Expenses:    Food Insecurity:     Worried About Running Out of Food in the Last Year:     Ran Out of Food in the Last Year:    Transportation Needs:     Lack of Transportation (Medical):      Lack of Transportation (Non-Medical):    Physical Activity:     Days of Exercise per Week:     Minutes of Exercise per Session:    Stress:     Feeling of Stress :    Social Connections:     Frequency of Communication with Friends and Family:     Frequency of Social Gatherings with Friends and Family:     Attends [employer] Services:     Active Member of Clubs or Organizations:     Attends Club or Organization Meetings:     Marital Status:    Intimate Partner Violence:     Fear of Current or Ex-Partner:     Emotionally Abused:     Physically Abused:     Sexually Abused:         Current Outpatient Medications:     fexofenadine (ALLEGRA) 180 MG tablet, Take 1 tablet by mouth daily, Disp: , Rfl:     levofloxacin (LEVAQUIN) 500 mg tablet, Take 1 tablet (500 mg total) by mouth every 24 hours for 10 days, Disp: 10 tablet, Rfl: 0    lisinopril (ZESTRIL) 20 mg tablet, , Disp: , Rfl:     metoprolol succinate (TOPROL-XL) 100 mg 24 hr tablet, , Disp: , Rfl:     naproxen sodium (ALEVE) 220 MG tablet, Take 1 tablet by mouth every 8 (eight) hours, Disp: , Rfl:     oxyCODONE (ROXICODONE) 5 mg immediate release tablet, Take 1 tablet (5 mg total) by mouth every 6 (six) hours as needed for severe painMax Daily Amount: 20 mg, Disp: 30 tablet, Rfl: 0    sertraline (ZOLOFT) 100 mg tablet, , Disp: , Rfl:   No current facility-administered medications for this visit  No family history on file  Review of Systems   Constitutional: Negative  Respiratory: Negative  Cardiovascular: Positive for leg swelling  Skin: Positive for wound  Objective:  /72   Pulse 74   Temp (!) 97 4 °F (36 3 °C)   Resp 18     Physical Exam  Vitals reviewed  Constitutional:       General: He is not in acute distress  Appearance: He is obese  Cardiovascular:      Rate and Rhythm: Normal rate  Pulmonary:      Effort: Pulmonary effort is normal    Musculoskeletal:      Left lower leg: Edema present  Skin:     Comments: See wound assessment   Neurological:      General: No focal deficit present  Mental Status: He is alert and oriented to person, place, and time  Psychiatric:         Mood and Affect: Mood normal          Behavior: Behavior normal          Wound 06/11/21 Venous Ulcer Pretibial Left (Active)   Wound Image    09/24/21 1033   Wound Description Pink;Slough; Epithelialization 09/24/21 1003   Hui-wound Assessment Fragile; Maceration; Hyperpigmented 09/24/21 1003   Wound Length (cm) 14 9 cm 09/24/21 1003   Wound Width (cm) 28 7 cm 09/24/21 1003   Wound Depth (cm) 0 1 cm 09/24/21 1003   Wound Surface Area (cm^2) 427 63 cm^2 09/24/21 1003   Wound Volume (cm^3) 42 763 cm^3 09/24/21 1003   Calculated Wound Volume (cm^3) 42 76 cm^3 09/24/21 1003   Change in Wound Size % 61 18 09/24/21 1003   Drainage Amount Large 09/24/21 1003   Drainage Description Serosanguineous; Bloody 09/24/21 1003   Non-staged Wound Description Full thickness 09/24/21 1003   Treatments Irrigation with NSS 08/20/21 0914   Dressing Status Intact 09/24/21 1003                         Procedures     Results from last 6 Months   Lab Units 06/06/21  0849   WOUND CULTURE  3+ Growth of Escherichia coli*  3+ Growth of Proteus mirabilis*  3+ Growth of Pseudomonas aeruginosa*  1+ Growth of          Wound Instructions:  Orders Placed This Encounter   Procedures    Wound compression and edema control      Wound compression and edema control      Surepress     Apply compression wrap to your affected Leg(s) from mid-foot to knee making sure to cover the heel  Apply in the morning and re-wrap as needed during the day if wrap becomes loose  Remove at bedtime and elevate legs or lie down      Avoid prolonged standing in one place      Elevate leg(s) above the level of the heart when sitting or as much as possible     Standing Status:   Future     Standing Expiration Date:   9/24/2022    Wound cleansing and dressings     Wound cleansing and dressings       Left lower leg:     Wash your hands with soap and water  Remove old dressing, discard into plastic bag and place in trash  Cleanse the wound with the prophase  prior to applying a clean dressing  Do not use tissue or cotton balls  Do not scrub the wound  Pat dry using gauze      Ok to shower on dressing change days      Apply a barrier cream around the wound, only apply to intact skin  Calmoseptine applied but desatin or anything with zinc will protect the intact skin  You can buy this over the counter      Apply Polymem (cut slits into polymem) to the wound bed, cover with absorbant blue pad, ABD as necessary, rolled gauze and secure tape   Apply with a sure press for compression      Change dressing every 2 days (wife to do dressing changes)     This was done today at the wound care center      Antibiotics ordered today, please  at your pharmacy     Walking will help  When seated keep legs elevated  Avoid standing in 1 place for long periods of time  When seated it is important to keep legs elevated (above the heart if possible)     Weight loss will also help aid in healing  Please try to eat 3-4 servings of protein per day (protein must be the main focus of your meals for weight loss and healing)                                                            Standing Status:   Future     Standing Expiration Date:   9/24/2022         Carolina Navas DO      Portions of the record may have been created with voice recognition software  Occasional wrong word or "sound a like" substitutions may have occurred due to the inherent limitations of voice recognition software  Read the chart carefully and recognize, using context, where substitutions have occurred

## 2021-10-08 DIAGNOSIS — I87.332 CHRONIC VENOUS HYPERTENSION (IDIOPATHIC) WITH ULCER AND INFLAMMATION OF LEFT LOWER EXTREMITY (CODE) (HCC): ICD-10-CM

## 2021-10-08 RX ORDER — OXYCODONE HYDROCHLORIDE 5 MG/1
5 TABLET ORAL EVERY 6 HOURS PRN
Qty: 30 TABLET | Refills: 0 | Status: SHIPPED | OUTPATIENT
Start: 2021-10-08 | End: 2021-11-05 | Stop reason: SDUPTHER

## 2021-10-15 ENCOUNTER — OFFICE VISIT (OUTPATIENT)
Dept: WOUND CARE | Facility: CLINIC | Age: 61
End: 2021-10-15
Payer: COMMERCIAL

## 2021-10-15 VITALS — DIASTOLIC BLOOD PRESSURE: 76 MMHG | SYSTOLIC BLOOD PRESSURE: 158 MMHG | TEMPERATURE: 97.6 F | HEART RATE: 75 BPM

## 2021-10-15 DIAGNOSIS — I83.029 VENOUS ULCER OF LEFT LOWER EXTREMITY WITH VARICOSE VEINS (HCC): ICD-10-CM

## 2021-10-15 DIAGNOSIS — L97.228 NON-PRESSURE CHRONIC ULCER OF LEFT CALF WITH OTHER SPECIFIED SEVERITY (HCC): ICD-10-CM

## 2021-10-15 DIAGNOSIS — L97.919 CHRONIC VENOUS HYPERTENSION (IDIOPATHIC) WITH ULCER AND INFLAMMATION OF RIGHT LOWER EXTREMITY (HCC): ICD-10-CM

## 2021-10-15 DIAGNOSIS — I87.332 CHRONIC VENOUS HYPERTENSION (IDIOPATHIC) WITH ULCER AND INFLAMMATION OF LEFT LOWER EXTREMITY (CODE) (HCC): Primary | ICD-10-CM

## 2021-10-15 DIAGNOSIS — L97.929 VENOUS ULCER OF LEFT LOWER EXTREMITY WITH VARICOSE VEINS (HCC): ICD-10-CM

## 2021-10-15 DIAGNOSIS — I87.331 CHRONIC VENOUS HYPERTENSION (IDIOPATHIC) WITH ULCER AND INFLAMMATION OF RIGHT LOWER EXTREMITY (HCC): ICD-10-CM

## 2021-10-15 DIAGNOSIS — E66.01 MORBID OBESITY WITH BMI OF 50.0-59.9, ADULT (HCC): ICD-10-CM

## 2021-10-15 PROCEDURE — 99213 OFFICE O/P EST LOW 20 MIN: CPT | Performed by: PREVENTIVE MEDICINE

## 2021-10-15 RX ORDER — LIDOCAINE HYDROCHLORIDE 40 MG/ML
5 SOLUTION TOPICAL ONCE
Status: COMPLETED | OUTPATIENT
Start: 2021-10-15 | End: 2021-10-15

## 2021-10-15 RX ADMIN — LIDOCAINE HYDROCHLORIDE 5 ML: 40 SOLUTION TOPICAL at 09:54

## 2021-11-05 ENCOUNTER — OFFICE VISIT (OUTPATIENT)
Dept: WOUND CARE | Facility: CLINIC | Age: 61
End: 2021-11-05
Payer: COMMERCIAL

## 2021-11-05 VITALS
TEMPERATURE: 97.1 F | RESPIRATION RATE: 20 BRPM | DIASTOLIC BLOOD PRESSURE: 92 MMHG | HEART RATE: 78 BPM | SYSTOLIC BLOOD PRESSURE: 165 MMHG

## 2021-11-05 DIAGNOSIS — L97.919 CHRONIC VENOUS HYPERTENSION (IDIOPATHIC) WITH ULCER AND INFLAMMATION OF RIGHT LOWER EXTREMITY (HCC): ICD-10-CM

## 2021-11-05 DIAGNOSIS — I87.331 CHRONIC VENOUS HYPERTENSION (IDIOPATHIC) WITH ULCER AND INFLAMMATION OF RIGHT LOWER EXTREMITY (HCC): ICD-10-CM

## 2021-11-05 DIAGNOSIS — I87.332 CHRONIC VENOUS HYPERTENSION (IDIOPATHIC) WITH ULCER AND INFLAMMATION OF LEFT LOWER EXTREMITY (CODE) (HCC): Primary | ICD-10-CM

## 2021-11-05 PROCEDURE — 99214 OFFICE O/P EST MOD 30 MIN: CPT | Performed by: PREVENTIVE MEDICINE

## 2021-11-05 PROCEDURE — 99212 OFFICE O/P EST SF 10 MIN: CPT | Performed by: PREVENTIVE MEDICINE

## 2021-11-05 RX ORDER — LIDOCAINE HYDROCHLORIDE 40 MG/ML
5 SOLUTION TOPICAL ONCE
Status: COMPLETED | OUTPATIENT
Start: 2021-11-05 | End: 2021-11-05

## 2021-11-05 RX ORDER — OXYCODONE HYDROCHLORIDE 5 MG/1
5 TABLET ORAL EVERY 6 HOURS PRN
Qty: 30 TABLET | Refills: 0 | Status: SHIPPED | OUTPATIENT
Start: 2021-11-05 | End: 2021-12-13 | Stop reason: SDUPTHER

## 2021-11-05 RX ADMIN — LIDOCAINE HYDROCHLORIDE 5 ML: 40 SOLUTION TOPICAL at 09:22

## 2021-11-29 ENCOUNTER — OFFICE VISIT (OUTPATIENT)
Dept: WOUND CARE | Facility: CLINIC | Age: 61
End: 2021-11-29
Payer: COMMERCIAL

## 2021-11-29 VITALS
RESPIRATION RATE: 20 BRPM | DIASTOLIC BLOOD PRESSURE: 91 MMHG | HEART RATE: 79 BPM | TEMPERATURE: 96.6 F | SYSTOLIC BLOOD PRESSURE: 167 MMHG

## 2021-11-29 DIAGNOSIS — E66.01 MORBID OBESITY WITH BMI OF 50.0-59.9, ADULT (HCC): ICD-10-CM

## 2021-11-29 DIAGNOSIS — I87.331 CHRONIC VENOUS HYPERTENSION (IDIOPATHIC) WITH ULCER AND INFLAMMATION OF RIGHT LOWER EXTREMITY (HCC): ICD-10-CM

## 2021-11-29 DIAGNOSIS — L97.919 CHRONIC VENOUS HYPERTENSION (IDIOPATHIC) WITH ULCER AND INFLAMMATION OF RIGHT LOWER EXTREMITY (HCC): ICD-10-CM

## 2021-11-29 DIAGNOSIS — I87.332 CHRONIC VENOUS HYPERTENSION (IDIOPATHIC) WITH ULCER AND INFLAMMATION OF LEFT LOWER EXTREMITY (CODE) (HCC): Primary | ICD-10-CM

## 2021-11-29 PROCEDURE — 17250 CHEM CAUT OF GRANLTJ TISSUE: CPT | Performed by: ORTHOPAEDIC SURGERY

## 2021-11-29 RX ORDER — LIDOCAINE HYDROCHLORIDE 40 MG/ML
5 SOLUTION TOPICAL ONCE
Status: COMPLETED | OUTPATIENT
Start: 2021-11-29 | End: 2021-11-29

## 2021-11-29 RX ADMIN — LIDOCAINE HYDROCHLORIDE 5 ML: 40 SOLUTION TOPICAL at 09:18

## 2021-12-13 ENCOUNTER — OFFICE VISIT (OUTPATIENT)
Dept: WOUND CARE | Facility: CLINIC | Age: 61
End: 2021-12-13
Payer: COMMERCIAL

## 2021-12-13 VITALS
DIASTOLIC BLOOD PRESSURE: 85 MMHG | TEMPERATURE: 97.3 F | SYSTOLIC BLOOD PRESSURE: 168 MMHG | RESPIRATION RATE: 20 BRPM | HEART RATE: 77 BPM

## 2021-12-13 DIAGNOSIS — I87.332 CHRONIC VENOUS HYPERTENSION (IDIOPATHIC) WITH ULCER AND INFLAMMATION OF LEFT LOWER EXTREMITY (CODE) (HCC): Primary | ICD-10-CM

## 2021-12-13 DIAGNOSIS — E66.01 MORBID OBESITY WITH BMI OF 50.0-59.9, ADULT (HCC): ICD-10-CM

## 2021-12-13 DIAGNOSIS — L97.228 NON-PRESSURE CHRONIC ULCER OF LEFT CALF WITH OTHER SPECIFIED SEVERITY (HCC): ICD-10-CM

## 2021-12-13 PROCEDURE — 17250 CHEM CAUT OF GRANLTJ TISSUE: CPT | Performed by: ORTHOPAEDIC SURGERY

## 2021-12-13 PROCEDURE — 99214 OFFICE O/P EST MOD 30 MIN: CPT | Performed by: ORTHOPAEDIC SURGERY

## 2021-12-13 RX ORDER — LIDOCAINE HYDROCHLORIDE 40 MG/ML
5 SOLUTION TOPICAL ONCE
Status: COMPLETED | OUTPATIENT
Start: 2021-12-13 | End: 2021-12-13

## 2021-12-13 RX ORDER — OXYCODONE HYDROCHLORIDE 5 MG/1
5 TABLET ORAL EVERY 6 HOURS PRN
Qty: 30 TABLET | Refills: 0 | Status: SHIPPED | OUTPATIENT
Start: 2021-12-13 | End: 2022-02-22 | Stop reason: ALTCHOICE

## 2021-12-13 RX ADMIN — LIDOCAINE HYDROCHLORIDE 5 ML: 40 SOLUTION TOPICAL at 09:15

## 2021-12-16 ENCOUNTER — OFFICE VISIT (OUTPATIENT)
Dept: SLEEP CENTER | Facility: CLINIC | Age: 61
End: 2021-12-16
Payer: COMMERCIAL

## 2021-12-16 ENCOUNTER — HOSPITAL ENCOUNTER (OUTPATIENT)
Dept: SLEEP CENTER | Facility: HOSPITAL | Age: 61
Discharge: HOME | End: 2021-12-16
Payer: COMMERCIAL

## 2021-12-16 VITALS
WEIGHT: 315 LBS | BODY MASS INDEX: 45.1 KG/M2 | SYSTOLIC BLOOD PRESSURE: 158 MMHG | DIASTOLIC BLOOD PRESSURE: 90 MMHG | HEIGHT: 70 IN | HEART RATE: 75 BPM

## 2021-12-16 DIAGNOSIS — G47.33 OBSTRUCTIVE SLEEP APNEA SYNDROME: Primary | ICD-10-CM

## 2021-12-16 DIAGNOSIS — I10 ESSENTIAL HYPERTENSION: ICD-10-CM

## 2021-12-16 DIAGNOSIS — E66.01 MORBID OBESITY WITH BMI OF 50.0-59.9, ADULT (HCC): ICD-10-CM

## 2021-12-16 DIAGNOSIS — G47.33 OBSTRUCTIVE SLEEP APNEA SYNDROME: ICD-10-CM

## 2021-12-16 DIAGNOSIS — Z91.89 RISK FACTORS FOR OBSTRUCTIVE SLEEP APNEA: ICD-10-CM

## 2021-12-16 PROCEDURE — G0399 HOME SLEEP TEST/TYPE 3 PORTA: HCPCS

## 2021-12-16 PROCEDURE — 99244 OFF/OP CNSLTJ NEW/EST MOD 40: CPT | Performed by: NURSE PRACTITIONER

## 2021-12-16 RX ORDER — LISINOPRIL 20 MG/1
20 TABLET ORAL 2 TIMES DAILY
COMMUNITY
Start: 2021-09-15 | End: 2022-02-22

## 2021-12-19 PROCEDURE — 95806 SLEEP STUDY UNATT&RESP EFFT: CPT | Performed by: INTERNAL MEDICINE

## 2021-12-20 ENCOUNTER — TELEPHONE (OUTPATIENT)
Dept: SLEEP CENTER | Facility: CLINIC | Age: 61
End: 2021-12-20

## 2021-12-20 ENCOUNTER — OFFICE VISIT (OUTPATIENT)
Dept: WOUND CARE | Facility: CLINIC | Age: 61
End: 2021-12-20
Payer: COMMERCIAL

## 2021-12-20 VITALS
RESPIRATION RATE: 20 BRPM | DIASTOLIC BLOOD PRESSURE: 92 MMHG | HEART RATE: 73 BPM | SYSTOLIC BLOOD PRESSURE: 168 MMHG | TEMPERATURE: 97 F

## 2021-12-20 DIAGNOSIS — L97.228 NON-PRESSURE CHRONIC ULCER OF LEFT CALF WITH OTHER SPECIFIED SEVERITY (HCC): ICD-10-CM

## 2021-12-20 DIAGNOSIS — E66.01 MORBID OBESITY WITH BMI OF 50.0-59.9, ADULT (HCC): ICD-10-CM

## 2021-12-20 DIAGNOSIS — G47.33 OBSTRUCTIVE SLEEP APNEA SYNDROME: Primary | ICD-10-CM

## 2021-12-20 DIAGNOSIS — I87.332 CHRONIC VENOUS HYPERTENSION (IDIOPATHIC) WITH ULCER AND INFLAMMATION OF LEFT LOWER EXTREMITY (CODE) (HCC): Primary | ICD-10-CM

## 2021-12-20 PROCEDURE — 17250 CHEM CAUT OF GRANLTJ TISSUE: CPT | Performed by: ORTHOPAEDIC SURGERY

## 2021-12-20 RX ORDER — LIDOCAINE HYDROCHLORIDE 40 MG/ML
5 SOLUTION TOPICAL ONCE
Status: COMPLETED | OUTPATIENT
Start: 2021-12-20 | End: 2021-12-20

## 2021-12-20 RX ADMIN — LIDOCAINE HYDROCHLORIDE 5 ML: 40 SOLUTION TOPICAL at 09:22

## 2021-12-21 ENCOUNTER — TELEPHONE (OUTPATIENT)
Dept: SLEEP CENTER | Facility: CLINIC | Age: 61
End: 2021-12-21

## 2021-12-27 ENCOUNTER — OFFICE VISIT (OUTPATIENT)
Dept: WOUND CARE | Facility: CLINIC | Age: 61
End: 2021-12-27
Payer: COMMERCIAL

## 2021-12-27 VITALS
RESPIRATION RATE: 20 BRPM | DIASTOLIC BLOOD PRESSURE: 103 MMHG | HEART RATE: 74 BPM | TEMPERATURE: 97.6 F | SYSTOLIC BLOOD PRESSURE: 187 MMHG

## 2021-12-27 DIAGNOSIS — E66.01 MORBID OBESITY WITH BMI OF 50.0-59.9, ADULT (HCC): ICD-10-CM

## 2021-12-27 DIAGNOSIS — L97.228 NON-PRESSURE CHRONIC ULCER OF LEFT CALF WITH OTHER SPECIFIED SEVERITY (HCC): ICD-10-CM

## 2021-12-27 DIAGNOSIS — I87.332 CHRONIC VENOUS HYPERTENSION (IDIOPATHIC) WITH ULCER AND INFLAMMATION OF LEFT LOWER EXTREMITY (CODE) (HCC): Primary | ICD-10-CM

## 2021-12-27 PROCEDURE — 17250 CHEM CAUT OF GRANLTJ TISSUE: CPT | Performed by: ORTHOPAEDIC SURGERY

## 2021-12-27 RX ORDER — LIDOCAINE HYDROCHLORIDE 40 MG/ML
5 SOLUTION TOPICAL ONCE
Status: COMPLETED | OUTPATIENT
Start: 2021-12-27 | End: 2021-12-27

## 2021-12-27 RX ADMIN — LIDOCAINE HYDROCHLORIDE 5 ML: 40 SOLUTION TOPICAL at 09:40

## 2022-01-03 ENCOUNTER — OFFICE VISIT (OUTPATIENT)
Dept: WOUND CARE | Facility: CLINIC | Age: 62
End: 2022-01-03
Payer: COMMERCIAL

## 2022-01-03 VITALS
DIASTOLIC BLOOD PRESSURE: 107 MMHG | TEMPERATURE: 96.4 F | HEART RATE: 84 BPM | SYSTOLIC BLOOD PRESSURE: 157 MMHG | RESPIRATION RATE: 18 BRPM

## 2022-01-03 DIAGNOSIS — E66.01 MORBID OBESITY WITH BMI OF 50.0-59.9, ADULT (HCC): ICD-10-CM

## 2022-01-03 DIAGNOSIS — L97.228 NON-PRESSURE CHRONIC ULCER OF LEFT CALF WITH OTHER SPECIFIED SEVERITY (HCC): ICD-10-CM

## 2022-01-03 DIAGNOSIS — I87.332 CHRONIC VENOUS HYPERTENSION (IDIOPATHIC) WITH ULCER AND INFLAMMATION OF LEFT LOWER EXTREMITY (CODE) (HCC): Primary | ICD-10-CM

## 2022-01-03 PROCEDURE — 17250 CHEM CAUT OF GRANLTJ TISSUE: CPT | Performed by: ORTHOPAEDIC SURGERY

## 2022-01-03 RX ORDER — LIDOCAINE HYDROCHLORIDE 40 MG/ML
5 SOLUTION TOPICAL ONCE
Status: COMPLETED | OUTPATIENT
Start: 2022-01-03 | End: 2022-01-03

## 2022-01-03 RX ADMIN — LIDOCAINE HYDROCHLORIDE 5 ML: 40 SOLUTION TOPICAL at 09:14

## 2022-01-03 NOTE — PROGRESS NOTES
Patient ID: Festus Begum is a 62 y o  male Date of Birth 1960       Chief Complaint   Patient presents with    Follow Up Wound Care Visit     LLE wound       Allergies:  Hydromorphone    Diagnosis:   Diagnosis ICD-10-CM Associated Orders   1  Chronic venous hypertension (idiopathic) with ulcer and inflammation of left lower extremity (CODE) ([facility])  I87 332 lidocaine (XYLOCAINE) 4 % topical solution 5 mL     Wound cleansing and dressings     Wound compression and edema control     Chemical Caut Of A Wound   2  Morbid obesity with BMI of 50 0-59 9, adult ([place] [address] )  E66 01     Z68 43    3  Non-pressure chronic ulcer of left calf with other specified severity ([place] [address] )  L97 228         Assessment and Plan :  · RLE venous ulcer healed : Continue compression with Circaids  · LLE venous ulcer continues to progressively heal   · Chemically cauterized as below  · Continue wound management with Calmoseptine to periwound and Polymem to wound bed, see wound orders below  · Continue compression with Surepress  · Continue frequent elevation of leg and increase exercise/walking  · Followup in 1 week or call sooner with questions or concerns      Subjective:   Patient presents for followup of LLE venous ulcer  No new complaints or issues  Pain in under control  Pt has been applying Calmoseptine on LLE with Polymem to wound bed and compression with Surepress  Denies fevers or chills        The following portions of the patient's history were reviewed and updated as appropriate:   Patient Active Problem List   Diagnosis    Venous ulcer of left lower extremity with varicose veins (HCC)    Acute blood loss anemia    Bleeding from varicose vein    Essential hypertension    Chronic venous hypertension (idiopathic) with ulcer and inflammation of left lower extremity (CODE) ([place] [address] )    Morbid obesity with BMI of 50 0-59 9, adult ([facility] )    obstructive sleep apnea      Past Medical History:   Diagnosis Date    Anxiety     CPAP (continuous positive airway pressure) dependence     Hypertension     Sleep apnea     Venous stasis ulcers of both lower extremities (HCC)      Past Surgical History:   Procedure Laterality Date    HERNIA REPAIR      umbilical    [facility] ENDOVENOUS LASER, 1ST VEIN Left 8/12/2021    Procedure: ENDOVASCULAR LASER THERAPY (EVLT), Left GSV;  Surgeon: Sherren Reams, MD;  Location: [facility];  Service: Vascular    TONSILLECTOMY       No family history on file  Social History     Socioeconomic History    Marital status: /Civil Union     Spouse name: Not on file    Number of children: Not on file    Years of education: Not on file    Highest education level: Not on file   Occupational History    Not on file   Tobacco Use    Smoking status: Never Smoker    Smokeless tobacco: Never Used   Vaping Use    Vaping Use: Never used   Substance and Sexual Activity    Alcohol use:  Yes     Alcohol/week: 3 0 standard drinks     Types: 2 Cans of beer, 1 Shots of liquor per week     Comment: daily    Drug use: Never    Sexual activity: Not on file   Other Topics Concern    Not on file   Social History Narrative    Not on file     Social Determinants of Health     Financial Resource Strain: Not on file   Food Insecurity: Not on file   Transportation Needs: Not on file   Physical Activity: Not on file   Stress: Not on file   Social Connections: Not on file   Intimate Partner Violence: Not on file   Housing Stability: Not on file       Current Outpatient Medications:     fexofenadine (ALLEGRA) 180 MG tablet, Take 1 tablet by mouth daily, Disp: , Rfl:     lisinopril (ZESTRIL) 20 mg tablet, , Disp: , Rfl:     lisinopril (ZESTRIL) 20 mg tablet, Take 20 mg by mouth 2 (two) times a day, Disp: , Rfl:     metoprolol succinate (TOPROL-XL) 100 mg 24 hr tablet, , Disp: , Rfl:     naproxen sodium (ALEVE) 220 MG tablet, Take 1 tablet by mouth every 8 (eight) hours, Disp: , Rfl:     oxyCODONE (ROXICODONE) 5 immediate release tablet, Take 1 tablet (5 mg total) by mouth every 6 (six) hours as needed for severe pain Max Daily Amount: 20 mg, Disp: 30 tablet, Rfl: 0    sertraline (ZOLOFT) 100 mg tablet, , Disp: , Rfl:   No current facility-administered medications for this visit  Review of Systems   Constitutional: Negative for appetite change, chills, fatigue, fever and unexpected weight change  HENT: Negative for congestion, hearing loss and postnasal drip  Respiratory: Negative for cough and shortness of breath  Cardiovascular: Positive for leg swelling  Musculoskeletal: Positive for gait problem  Skin: Positive for wound (LLE )  Negative for rash  Neurological: Negative for numbness  Hematological: Does not bruise/bleed easily  Psychiatric/Behavioral: Negative  Objective:  BP (!) 157/107   Pulse 84   Temp (!) 96 4 °F (35 8 °C)   Resp 18   Pain Score: 0-No pain     Physical Exam  Vitals reviewed  Constitutional:       General: He is not in acute distress  Appearance: Normal appearance  He is well-developed  He is obese  HENT:      Head: Normocephalic and atraumatic  Cardiovascular:      Rate and Rhythm: Normal rate  Pulmonary:      Effort: Pulmonary effort is normal    Musculoskeletal:         General: No deformity  Right lower leg: Edema present  Left lower leg: Edema present  Skin:     General: Skin is warm and dry  Findings: Wound present  Comments: See wound assessment   Neurological:      General: No focal deficit present  Mental Status: He is alert and oriented to person, place, and time  Gait: Gait abnormal    Psychiatric:         Mood and Affect: Mood and affect normal          Behavior: Behavior normal  Behavior is cooperative  Wound 06/11/21 Venous Ulcer Pretibial Left (Active)   Wound Image Images linked 01/03/22 0910   Wound Description Pink;Epithelialization; Beefy red;Hypergranulation 01/03/22 0910   Hui-wound Assessment Fragile; Hyperpigmented 01/03/22 0910   Wound Length (cm) 10 4 cm 01/03/22 0910   Wound Width (cm) 22 cm 01/03/22 0910   Wound Depth (cm) 0 1 cm 01/03/22 0910   Wound Surface Area (cm^2) 228 8 cm^2 01/03/22 0910   Wound Volume (cm^3) 22 88 cm^3 01/03/22 0910   Calculated Wound Volume (cm^3) 22 88 cm^3 01/03/22 0910   Change in Wound Size % 79 23 01/03/22 0910   Drainage Amount Moderate 01/03/22 0910   Drainage Description Serosanguineous 01/03/22 0910   Non-staged Wound Description Full thickness 01/03/22 0910   Dressing Status Intact 01/03/22 0910           Chemical Caut Of A Wound     Date/Time 1/3/2022 9:51 AM     Performed by  Dallas Ma PA-C     Authorized by Dallas Ma PA-C       Associated Wounds:   Wound 06/11/21 Venous Ulcer Pretibial Left     Universal Protocol   Consent: Verbal consent obtained  Written consent obtained  Risks and benefits: risks, benefits and alternatives were discussed  Consent given by: patient  Time out: Immediately prior to procedure a "time out" was called to verify the correct patient, procedure, equipment, support staff and site/side marked as required  Patient understanding: patient states understanding of the procedure being performed  Patient identity confirmed: verbally with patient        Local anesthesia used: yes     Anesthesia   Local anesthesia used: yes  Local Anesthetic: topical anesthetic     Sedation   Patient sedated: no        Specimen: no    Culture: no   Procedure Details   Procedure Notes: After permission and placement of topical local anesthetic, 3 Silver nitrate stick(s) were used to cauterize the hypergranular tissue of the open wound  Normal saline was used to neutralize the reaction  A dressing was applied, see orders  Patient tolerated procedure well        Patient tolerance: Patient tolerated the procedure well with no immediate complications                Wound Instructions:  Orders Placed This Encounter   Procedures    Wound cleansing and dressings     Left lower leg:     Wash your hands with soap and water  Remove old dressing, discard into plastic bag and place in trash  Cleanse the wound with the prophase  prior to applying a clean dressing  Do not use tissue or cotton balls  Do not scrub the wound  Pat dry using gauze      Ok to shower on dressing change days      Apply a barrier cream around the wound, only apply to intact skin  Calmoseptine applied today at wound center or anything with zinc will protect the intact skin  Apply Polymem (cut slits into polymem) to the wound bed, cover with absorbant blue pad, ABD as necessary, rolled gauze and secure tape  Apply with a sure press for compression  Change dressing every 2 days (wife to do dressing changes)     This was done today at the wound care center                            Standing Status:   Future     Standing Expiration Date:   1/3/2023    Wound compression and edema control     Surepress     Apply compression wrap to your affected Leg(s) from mid-foot to knee making sure to cover the heel  Apply in the morning and re-wrap as needed during the day if wrap becomes loose  Remove at bedtime and elevate legs or lie down      Avoid prolonged standing in one place      Elevate leg(s) above the level of the heart when sitting or as much as possible     Standing Status:   Future     Standing Expiration Date:   1/3/2023    Chemical Caut Of A Wound     This order was created via procedure documentation       Jyoti Freeman PA-C, [facility]      Portions of the record may have been created with voice recognition software  Occasional wrong word or "sound alike" substitutions may have occurred due to the inherent limitations of voice recognition software  Read the chart carefully and recognize, using context, where substitutions have occurred

## 2022-01-03 NOTE — PATIENT INSTRUCTIONS
Orders Placed This Encounter   Procedures    Wound cleansing and dressings     Left lower leg:     Wash your hands with soap and water  Remove old dressing, discard into plastic bag and place in trash  Cleanse the wound with the prophase  prior to applying a clean dressing  Do not use tissue or cotton balls  Do not scrub the wound  Pat dry using gauze      Ok to shower on dressing change days      Apply a barrier cream around the wound, only apply to intact skin  Calmoseptine applied today at wound center or anything with zinc will protect the intact skin  Apply Polymem (cut slits into polymem) to the wound bed, cover with absorbant blue pad, ABD as necessary, rolled gauze and secure tape  Apply with a sure press for compression  Change dressing every 2 days (wife to do dressing changes)     This was done today at the wound care center                            Standing Status:   Future     Standing Expiration Date:   1/3/2023    Wound compression and edema control     Surepress     Apply compression wrap to your affected Leg(s) from mid-foot to knee making sure to cover the heel  Apply in the morning and re-wrap as needed during the day if wrap becomes loose   Remove at bedtime and elevate legs or lie down      Avoid prolonged standing in one place      Elevate leg(s) above the level of the heart when sitting or as much as possible     Standing Status:   Future     Standing Expiration Date:   1/3/2023

## 2022-01-10 ENCOUNTER — OFFICE VISIT (OUTPATIENT)
Dept: WOUND CARE | Facility: CLINIC | Age: 62
End: 2022-01-10
Payer: COMMERCIAL

## 2022-01-10 VITALS
HEART RATE: 75 BPM | DIASTOLIC BLOOD PRESSURE: 100 MMHG | TEMPERATURE: 97.1 F | SYSTOLIC BLOOD PRESSURE: 190 MMHG | RESPIRATION RATE: 18 BRPM

## 2022-01-10 DIAGNOSIS — E66.01 MORBID OBESITY WITH BMI OF 50.0-59.9, ADULT (HCC): ICD-10-CM

## 2022-01-10 DIAGNOSIS — I87.332 CHRONIC VENOUS HYPERTENSION (IDIOPATHIC) WITH ULCER AND INFLAMMATION OF LEFT LOWER EXTREMITY (CODE) (HCC): Primary | ICD-10-CM

## 2022-01-10 DIAGNOSIS — L97.228 NON-PRESSURE CHRONIC ULCER OF LEFT CALF WITH OTHER SPECIFIED SEVERITY (HCC): ICD-10-CM

## 2022-01-10 PROCEDURE — 17250 CHEM CAUT OF GRANLTJ TISSUE: CPT | Performed by: ORTHOPAEDIC SURGERY

## 2022-01-10 RX ORDER — LIDOCAINE HYDROCHLORIDE 40 MG/ML
5 SOLUTION TOPICAL ONCE
Status: COMPLETED | OUTPATIENT
Start: 2022-01-10 | End: 2022-01-10

## 2022-01-10 RX ADMIN — LIDOCAINE HYDROCHLORIDE 5 ML: 40 SOLUTION TOPICAL at 09:32

## 2022-01-10 NOTE — PATIENT INSTRUCTIONS
Orders Placed This Encounter   Procedures    Wound cleansing and dressings     Wound cleansing and dressings         Left lower leg:     Wash your hands with soap and water  Remove old dressing, discard into plastic bag and place in trash  Cleanse the wound with the prophase  prior to applying a clean dressing  Do not use tissue or cotton balls  Do not scrub the wound  Pat dry using gauze      Ok to shower on dressing change days      Apply a barrier cream around the wound, only apply to intact skin  Calmoseptine applied today at wound center or anything with zinc will protect the intact skin  Apply Polymem (cut slits into polymem) to the wound bed, cover with absorbant blue pad, ABD as necessary, rolled gauze and secure tape  Apply with a sure press for compression  Change dressing every 2 days (wife to do dressing changes)     This was done today at the wound care center                                     Wound compression and edema control      Surepress     Apply compression wrap to your affected Leg(s) from mid-foot to knee making sure to cover the heel  Apply in the morning and re-wrap as needed during the day if wrap becomes loose   Remove at bedtime and elevate legs or lie down      Avoid prolonged standing in one place      Elevate leg(s) above the level of the heart when sitting or as much as possible     Standing Status:   Future     Standing Expiration Date:   1/10/2023

## 2022-01-10 NOTE — PROGRESS NOTES
Patient ID: Festus Begum is a 62 y o  male Date of Birth 1960       Chief Complaint   Patient presents with    Follow Up Wound Care Visit     left lower leg ulcer       Allergies:  Hydromorphone    Diagnosis:   Diagnosis ICD-10-CM Associated Orders   1  Chronic venous hypertension (idiopathic) with ulcer and inflammation of left lower extremity (CODE) ([facility])  I87 332 lidocaine (XYLOCAINE) 4 % topical solution 5 mL     Wound cleansing and dressings   2  Morbid obesity with BMI of 50 0-59 9, adult ([place] [address] )  E66 01     Z68 43    3  Non-pressure chronic ulcer of left calf with other specified severity ([place] [address] )  L97 228         Assessment and Plan :  · LLE venous ulcer continues to progressively heal   · Chemically cauterized as below  · Continue wound management with Calmoseptine to periwound and Polymem to wound bed, see wound orders below  · Continue compression with Surepress  · Continue frequent elevation of leg and increase exercise/walking  · Followup in 2 weeks or call sooner with questions or concerns      Subjective:   Patient presents for followup of LLE venous ulcer  No new complaints or issues    Denies fevers or chills          The following portions of the patient's history were reviewed and updated as appropriate:   Patient Active Problem List   Diagnosis    Venous ulcer of left lower extremity with varicose veins (HCC)    Acute blood loss anemia    Bleeding from varicose vein    Essential hypertension    Chronic venous hypertension (idiopathic) with ulcer and inflammation of left lower extremity (CODE) ([place] [address] )    Morbid obesity with BMI of 50 0-59 9, adult ([place] [address] )    obstructive sleep apnea      Past Medical History:   Diagnosis Date    Anxiety     CPAP (continuous positive airway pressure) dependence     Hypertension     Sleep apnea     Venous stasis ulcers of both lower extremities ([facility] )      Past Surgical History:   Procedure Laterality Date    HERNIA REPAIR      umbilical    [place] ENDOVENOUS LASER, 1ST VEIN Left 8/12/2021    Procedure: ENDOVASCULAR LASER THERAPY (EVLT), Left GSV;  Surgeon: Sherren Reams, MD;  Location: [facility];  Service: Vascular    TONSILLECTOMY       No family history on file  Social History     Socioeconomic History    Marital status: /Civil Union     Spouse name: None    Number of children: None    Years of education: None    Highest education level: None   Occupational History    None   Tobacco Use    Smoking status: Never Smoker    Smokeless tobacco: Never Used   Vaping Use    Vaping Use: Never used   Substance and Sexual Activity    Alcohol use: Yes     Alcohol/week: 3 0 standard drinks     Types: 2 Cans of beer, 1 Shots of liquor per week     Comment: daily    Drug use: Never    Sexual activity: None   Other Topics Concern    None   Social History Narrative    None     Social Determinants of Health     Financial Resource Strain: Not on file   Food Insecurity: Not on file   Transportation Needs: Not on file   Physical Activity: Not on file   Stress: Not on file   Social Connections: Not on file   Intimate Partner Violence: Not on file   Housing Stability: Not on file       Current Outpatient Medications:     fexofenadine (ALLEGRA) 180 MG tablet, Take 1 tablet by mouth daily, Disp: , Rfl:     lisinopril (ZESTRIL) 20 mg tablet, , Disp: , Rfl:     lisinopril (ZESTRIL) 20 mg tablet, Take 20 mg by mouth 2 (two) times a day, Disp: , Rfl:     metoprolol succinate (TOPROL-XL) 100 mg 24 hr tablet, , Disp: , Rfl:     naproxen sodium (ALEVE) 220 MG tablet, Take 1 tablet by mouth every 8 (eight) hours, Disp: , Rfl:     oxyCODONE (ROXICODONE) 5 immediate release tablet, Take 1 tablet (5 mg total) by mouth every 6 (six) hours as needed for severe pain Max Daily Amount: 20 mg, Disp: 30 tablet, Rfl: 0    sertraline (ZOLOFT) 100 mg tablet, , Disp: , Rfl:   No current facility-administered medications for this visit      Review of Systems Constitutional: Negative for appetite change, chills, fatigue, fever and unexpected weight change  HENT: Negative for congestion, hearing loss and postnasal drip  Respiratory: Negative for cough and shortness of breath  Cardiovascular: Positive for leg swelling  Musculoskeletal: Positive for gait problem  Skin: Positive for wound (LLE )  Negative for rash  Neurological: Negative for numbness  Hematological: Does not bruise/bleed easily  Psychiatric/Behavioral: Negative  Objective:  BP (!) 190/100   Pulse 75   Temp (!) 97 1 °F (36 2 °C)   Resp 18   Pain Score:   2     Physical Exam  Vitals reviewed  Constitutional:       General: He is not in acute distress  Appearance: Normal appearance  He is well-developed  He is morbidly obese  HENT:      Head: Normocephalic and atraumatic  Cardiovascular:      Rate and Rhythm: Normal rate  Pulmonary:      Effort: Pulmonary effort is normal    Musculoskeletal:         General: No deformity  Right lower leg: Edema present  Left lower leg: Edema present  Skin:     General: Skin is warm and dry  Findings: Wound present  Comments: See wound assessment   Neurological:      General: No focal deficit present  Mental Status: He is alert and oriented to person, place, and time  Gait: Gait abnormal    Psychiatric:         Mood and Affect: Mood and affect normal          Behavior: Behavior normal  Behavior is cooperative  Wound 06/11/21 Venous Ulcer Pretibial Left (Active)   Wound Image Images linked 01/10/22 0931   Hui-wound Assessment Fragile; Hyperpigmented 01/10/22 0929   Wound Length (cm) 9 3 cm 01/10/22 0929   Wound Width (cm) 22 5 cm 01/10/22 0929   Wound Depth (cm) 0 1 cm 01/10/22 0929   Wound Surface Area (cm^2) 209 25 cm^2 01/10/22 0929   Wound Volume (cm^3) 20 925 cm^3 01/10/22 0929   Calculated Wound Volume (cm^3) 20 93 cm^3 01/10/22 0929   Change in Wound Size % 81 01/10/22 0929   Drainage Amount Moderate 01/10/22 0929   Drainage Description Serosanguineous 01/10/22 0929   Non-staged Wound Description Full thickness 01/10/22 0929   Dressing Status Intact 01/10/22 0929           Chemical Caut Of A Wound     Date/Time 1/10/2022 9:53 AM     Performed by  Jesi Chau PA-C     Authorized by Jesi Chau PA-C       Associated Wounds:   Wound 06/11/21 Venous Ulcer Pretibial Left     Universal Protocol   Consent: Verbal consent obtained  Written consent obtained  Risks and benefits: risks, benefits and alternatives were discussed  Consent given by: patient  Time out: Immediately prior to procedure a "time out" was called to verify the correct patient, procedure, equipment, support staff and site/side marked as required  Patient understanding: patient states understanding of the procedure being performed  Patient identity confirmed: verbally with patient        Local anesthesia used: yes     Anesthesia   Local anesthesia used: yes  Local Anesthetic: topical anesthetic     Sedation   Patient sedated: no        Specimen: no    Culture: no   Procedure Details   Procedure Notes: After permission and placement of topical local anesthetic, 2 Silver nitrate stick(s) were used to cauterize the hypergranular tissue of the open wound  Normal saline was used to neutralize the reaction  A dressing was applied, see orders  Patient tolerated procedure well  Patient tolerance: Patient tolerated the procedure well with no immediate complications                   Wound Instructions:  Orders Placed This Encounter   Procedures    Wound cleansing and dressings     Wound cleansing and dressings         Left lower leg:     Wash your hands with soap and water  Remove old dressing, discard into plastic bag and place in trash  Cleanse the wound with the prophase  prior to applying a clean dressing  Do not use tissue or cotton balls  Do not scrub the wound   Pat dry using gauze      Ok to shower on dressing change days      Apply a barrier cream around the wound, only apply to intact skin  Calmoseptine applied today at wound center or anything with zinc will protect the intact skin  Apply Polymem (cut slits into polymem) to the wound bed, cover with absorbant blue pad, ABD as necessary, rolled gauze and secure tape  Apply with a sure press for compression  Change dressing every 2 days (wife to do dressing changes)     This was done today at the wound care center                                     Wound compression and edema control      Surepress     Apply compression wrap to your affected Leg(s) from mid-foot to knee making sure to cover the heel  Apply in the morning and re-wrap as needed during the day if wrap becomes loose  Remove at bedtime and elevate legs or lie down      Avoid prolonged standing in one place      Elevate leg(s) above the level of the heart when sitting or as much as possible     Standing Status:   Future     Standing Expiration Date:   1/10/2023       Naveen Renner PA-C, [facility]      Portions of the record may have been created with voice recognition software  Occasional wrong word or "sound alike" substitutions may have occurred due to the inherent limitations of voice recognition software  Read the chart carefully and recognize, using context, where substitutions have occurred

## 2022-01-13 ENCOUNTER — TELEPHONE (OUTPATIENT)
Dept: SLEEP CENTER | Facility: CLINIC | Age: 62
End: 2022-01-13

## 2022-01-24 ENCOUNTER — OFFICE VISIT (OUTPATIENT)
Dept: WOUND CARE | Facility: CLINIC | Age: 62
End: 2022-01-24
Payer: COMMERCIAL

## 2022-01-24 VITALS
SYSTOLIC BLOOD PRESSURE: 170 MMHG | RESPIRATION RATE: 20 BRPM | HEART RATE: 73 BPM | TEMPERATURE: 98.3 F | DIASTOLIC BLOOD PRESSURE: 93 MMHG

## 2022-01-24 DIAGNOSIS — L97.228 NON-PRESSURE CHRONIC ULCER OF LEFT CALF WITH OTHER SPECIFIED SEVERITY (HCC): ICD-10-CM

## 2022-01-24 DIAGNOSIS — E66.01 MORBID OBESITY WITH BMI OF 50.0-59.9, ADULT (HCC): ICD-10-CM

## 2022-01-24 DIAGNOSIS — I87.332 CHRONIC VENOUS HYPERTENSION (IDIOPATHIC) WITH ULCER AND INFLAMMATION OF LEFT LOWER EXTREMITY (CODE) (HCC): Primary | ICD-10-CM

## 2022-01-24 PROCEDURE — 97597 DBRDMT OPN WND 1ST 20 CM/<: CPT | Performed by: ORTHOPAEDIC SURGERY

## 2022-01-24 PROCEDURE — 97598 DBRDMT OPN WND ADDL 20CM/<: CPT | Performed by: ORTHOPAEDIC SURGERY

## 2022-01-24 RX ORDER — LIDOCAINE HYDROCHLORIDE 40 MG/ML
5 SOLUTION TOPICAL ONCE
Status: COMPLETED | OUTPATIENT
Start: 2022-01-24 | End: 2022-01-24

## 2022-01-24 RX ADMIN — LIDOCAINE HYDROCHLORIDE 5 ML: 40 SOLUTION TOPICAL at 09:04

## 2022-01-24 NOTE — PROGRESS NOTES
Patient ID: Keaton Hammond is a 62 y o  male Date of Birth 1960       Chief Complaint   Patient presents with    Follow Up Wound Care Visit     left leg       Allergies:  Hydromorphone    Diagnosis:   Diagnosis ICD-10-CM Associated Orders   1  Chronic venous hypertension (idiopathic) with ulcer and inflammation of left lower extremity (CODE) ([facility])  I87 332 Wound cleansing and dressings     Wound compression and edema control     lidocaine (XYLOCAINE) 4 % topical solution 5 mL     Debridement   2  Non-pressure chronic ulcer of left calf with other specified severity ([place] [address] )  L97 228    3  Morbid obesity with BMI of 50 0-59 9, adult ([facility])  E66 01     Z68 43         Assessment and Plan :  · Continue compression with Circaids on RLE   · LLE wound progressively healing  · Selectively debrided as below  · Continue wound management with Calmoseptine to periwound and Polymem to wound bed, see wound orders below  · Continue compression with Surepress  · No harsh cleansers such as alcohol, peroxide, or antibacterial soap, do not submerge in water  · Continue frequent elevation of leg and increase exercise/walking  · Encouraged to continue eating protein and weight loss  · Followup in 2 week or call sooner with questions or concerns    Subjective:   Patient presents for followup of LLE venous ulcer  No new complaints or issues       The following portions of the patient's history were reviewed and updated as appropriate:   Patient Active Problem List   Diagnosis    Venous ulcer of left lower extremity with varicose veins ([facility])    Acute blood loss anemia    Bleeding from varicose vein    Essential hypertension    Chronic venous hypertension (idiopathic) with ulcer and inflammation of left lower extremity (CODE) ([place] [address] )    Morbid obesity with BMI of 50 0-59 9, adult ([facility] )    obstructive sleep apnea      Past Medical History:   Diagnosis Date    Anxiety     CPAP (continuous positive airway pressure) dependence     Hypertension     Sleep apnea     Venous stasis ulcers of both lower extremities (HCC)      Past Surgical History:   Procedure Laterality Date    HERNIA REPAIR      umbilical    [place] ENDOVENOUS LASER, 1ST VEIN Left 8/12/2021    Procedure: ENDOVASCULAR LASER THERAPY (EVLT), Left GSV;  Surgeon: Purvi Vines MD;  Location: [facility];  Service: Vascular    TONSILLECTOMY       No family history on file  Social History     Socioeconomic History    Marital status: /Civil Union     Spouse name: None    Number of children: None    Years of education: None    Highest education level: None   Occupational History    None   Tobacco Use    Smoking status: Never Smoker    Smokeless tobacco: Never Used   Vaping Use    Vaping Use: Never used   Substance and Sexual Activity    Alcohol use:  Yes     Alcohol/week: 3 0 standard drinks     Types: 2 Cans of beer, 1 Shots of liquor per week     Comment: daily    Drug use: Never    Sexual activity: None   Other Topics Concern    None   Social History Narrative    None     Social Determinants of Health     Financial Resource Strain: Not on file   Food Insecurity: Not on file   Transportation Needs: Not on file   Physical Activity: Not on file   Stress: Not on file   Social Connections: Not on file   Intimate Partner Violence: Not on file   Housing Stability: Not on file       Current Outpatient Medications:     fexofenadine (ALLEGRA) 180 MG tablet, Take 1 tablet by mouth daily, Disp: , Rfl:     lisinopril (ZESTRIL) 20 mg tablet, , Disp: , Rfl:     lisinopril (ZESTRIL) 20 mg tablet, Take 20 mg by mouth 2 (two) times a day, Disp: , Rfl:     metoprolol succinate (TOPROL-XL) 100 mg 24 hr tablet, , Disp: , Rfl:     naproxen sodium (ALEVE) 220 MG tablet, Take 1 tablet by mouth every 8 (eight) hours, Disp: , Rfl:     oxyCODONE (ROXICODONE) 5 immediate release tablet, Take 1 tablet (5 mg total) by mouth every 6 (six) hours as needed for severe pain Max Daily Amount: 20 mg, Disp: 30 tablet, Rfl: 0    sertraline (ZOLOFT) 100 mg tablet, , Disp: , Rfl:   No current facility-administered medications for this visit  Review of Systems   Constitutional: Negative for appetite change, chills, fatigue, fever and unexpected weight change  HENT: Negative for congestion, hearing loss and postnasal drip  Respiratory: Negative for cough and shortness of breath  Cardiovascular: Positive for leg swelling  Musculoskeletal: Positive for gait problem  Skin: Positive for wound (LLE )  Negative for rash  Neurological: Negative for numbness  Hematological: Does not bruise/bleed easily  Psychiatric/Behavioral: Negative  Objective:  /93   Pulse 73   Temp 98 3 °F (36 8 °C)   Resp 20   Pain Score: 0-No pain     Physical Exam  Vitals reviewed  Constitutional:       General: He is not in acute distress  Appearance: Normal appearance  He is well-developed  He is morbidly obese  HENT:      Head: Normocephalic and atraumatic  Cardiovascular:      Rate and Rhythm: Normal rate  Pulmonary:      Effort: Pulmonary effort is normal    Musculoskeletal:         General: No deformity  Right lower leg: Edema present  Left lower leg: Edema present  Skin:     General: Skin is warm and dry  Findings: Wound (LLE) present  Comments: Beefy red granulation tissue  See wound assessment   Neurological:      General: No focal deficit present  Mental Status: He is alert and oriented to person, place, and time  Gait: Gait abnormal    Psychiatric:         Mood and Affect: Mood and affect normal          Behavior: Behavior normal  Behavior is cooperative  Wound 06/11/21 Venous Ulcer Pretibial Left (Active)   Wound Description Epithelialization; Beefy red;Hypergranulation 01/24/22 0853   Hui-wound Assessment Fragile; Hyperpigmented 01/24/22 0853   Wound Length (cm) 9 3 cm 01/24/22 0853   Wound Width (cm) 20 8 cm 01/24/22 0853   Wound Depth (cm) 0 1 cm 01/24/22 0853   Wound Surface Area (cm^2) 193 44 cm^2 01/24/22 0853   Wound Volume (cm^3) 19 344 cm^3 01/24/22 0853   Calculated Wound Volume (cm^3) 19 34 cm^3 01/24/22 0853   Change in Wound Size % 82 44 01/24/22 0853   Drainage Amount Moderate 01/24/22 0853   Drainage Description Serosanguineous 01/24/22 0853   Non-staged Wound Description Full thickness 01/24/22 0853   Treatments Irrigation with NSS 08/20/21 0914   Dressing Status Intact 01/24/22 0853        Debridement   Wound 06/11/21 Venous Ulcer Pretibial Left    Universal Protocol:  Consent: Verbal consent obtained  Written consent obtained  Risks and benefits: risks, benefits and alternatives were discussed  Consent given by: patient  Time out: Immediately prior to procedure a "time out" was called to verify the correct patient, procedure, equipment, support staff and site/side marked as required  Patient understanding: patient states understanding of the procedure being performed  Patient identity confirmed: verbally with patient      Performed by: PA  Debridement type: selective  Pain control: lidocaine 4%  Post-debridement measurements  Length (cm): 9 3  Width (cm): 20 8  Depth (cm): 0 1  Percent debrided: 50%  Surface Area (cm^2): 193 44  Area debrided (cm^2): 96 72  Volume (cm^3): 19 34  Devitalized tissue debrided: exudate and fibrin  Instrument(s) utilized: curette  Bleeding: small  Hemostasis obtained with: pressure  Procedural pain (0-10): 0  Post-procedural pain: 0   Response to treatment: procedure was tolerated well             Wound Instructions:  Orders Placed This Encounter   Procedures    Wound cleansing and dressings     Left lower leg:     Wash your hands with soap and water  Remove old dressing, discard into plastic bag and place in trash  Cleanse the wound with the prophase  prior to applying a clean dressing  Do not use tissue or cotton balls  Do not scrub the wound   Pat dry using gauze      Ok to shower on dressing change days      Apply a barrier cream around the wound, only apply to intact skin  Calmoseptine applied today at wound center or anything with zinc will protect the intact skin  Apply Polymem (cut slits into polymem) to the wound bed, cover with ABD , rolled gauze and secure tape  Apply with a sure press for compression  Change dressing every 2 days (wife to do dressing changes)  Apply ABD to posterior ankle prior to surepress      This was done today at the wound care center                                                    Standing Status:   Future     Standing Expiration Date:   1/24/2023    Wound compression and edema control     Surepress left leg     Apply compression wrap to your affected Leg(s) from mid-foot to knee making sure to cover the heel  Apply in the morning and re-wrap as needed during the day if wrap becomes loose  Remove at bedtime and elevate legs or lie down      Avoid prolonged standing in one place      Elevate leg(s) above the level of the heart when sitting or as much as possible     Standing Status:   Future     Standing Expiration Date:   1/24/2023    Debridement     This order was created via procedure documentation       Tone Estes PA-C, [facility] [place]      Portions of the record may have been created with voice recognition software  Occasional wrong word or "sound alike" substitutions may have occurred due to the inherent limitations of voice recognition software  Read the chart carefully and recognize, using context, where substitutions have occurred

## 2022-01-24 NOTE — PATIENT INSTRUCTIONS
Orders Placed This Encounter   Procedures    Wound cleansing and dressings     Left lower leg:     Wash your hands with soap and water  Remove old dressing, discard into plastic bag and place in trash  Cleanse the wound with the prophase  prior to applying a clean dressing  Do not use tissue or cotton balls  Do not scrub the wound  Pat dry using gauze      Ok to shower on dressing change days      Apply a barrier cream around the wound, only apply to intact skin  Calmoseptine applied today at wound center or anything with zinc will protect the intact skin  Apply Polymem (cut slits into polymem) to the wound bed, cover with ABD , rolled gauze and secure tape  Apply with a sure press for compression  Change dressing every 2 days (wife to do dressing changes)  Apply ABD to posterior ankle prior to surepress      This was done today at the wound care center                                                    Standing Status:   Future     Standing Expiration Date:   1/24/2023    Wound compression and edema control     Surepress left leg     Apply compression wrap to your affected Leg(s) from mid-foot to knee making sure to cover the heel  Apply in the morning and re-wrap as needed during the day if wrap becomes loose   Remove at bedtime and elevate legs or lie down      Avoid prolonged standing in one place      Elevate leg(s) above the level of the heart when sitting or as much as possible     Standing Status:   Future     Standing Expiration Date:   1/24/2023

## 2022-02-07 ENCOUNTER — OFFICE VISIT (OUTPATIENT)
Dept: WOUND CARE | Facility: CLINIC | Age: 62
End: 2022-02-07
Payer: COMMERCIAL

## 2022-02-07 VITALS
DIASTOLIC BLOOD PRESSURE: 79 MMHG | HEART RATE: 78 BPM | RESPIRATION RATE: 18 BRPM | SYSTOLIC BLOOD PRESSURE: 154 MMHG | TEMPERATURE: 97.9 F

## 2022-02-07 DIAGNOSIS — E66.01 MORBID OBESITY WITH BMI OF 50.0-59.9, ADULT (HCC): ICD-10-CM

## 2022-02-07 DIAGNOSIS — L97.228 NON-PRESSURE CHRONIC ULCER OF LEFT CALF WITH OTHER SPECIFIED SEVERITY (HCC): ICD-10-CM

## 2022-02-07 DIAGNOSIS — I87.332 CHRONIC VENOUS HYPERTENSION (IDIOPATHIC) WITH ULCER AND INFLAMMATION OF LEFT LOWER EXTREMITY (CODE) (HCC): Primary | ICD-10-CM

## 2022-02-07 PROCEDURE — 17250 CHEM CAUT OF GRANLTJ TISSUE: CPT | Performed by: ORTHOPAEDIC SURGERY

## 2022-02-07 RX ORDER — LIDOCAINE HYDROCHLORIDE 40 MG/ML
5 SOLUTION TOPICAL ONCE
Status: COMPLETED | OUTPATIENT
Start: 2022-02-07 | End: 2022-02-07

## 2022-02-07 RX ADMIN — LIDOCAINE HYDROCHLORIDE 5 ML: 40 SOLUTION TOPICAL at 09:30

## 2022-02-07 NOTE — PATIENT INSTRUCTIONS
Orders Placed This Encounter   Procedures    Wound cleansing and dressings     Wound cleansing and dressings         Left lower leg:     Wash your hands with soap and water  Remove old dressing, discard into plastic bag and place in trash  Cleanse the wound with the prophase  prior to applying a clean dressing  Do not use tissue or cotton balls  Do not scrub the wound  Pat dry using gauze      Ok to shower on dressing change days      Apply a barrier cream around the wound, only apply to intact skin  Calmoseptine applied today at wound center or anything with zinc will protect the intact skin  Apply Polymem (cut slits into polymem) to the wound bed, cover with ABD , rolled gauze and secure tape  Apply with a sure press for compression  Change dressing every 2 days (wife to do dressing changes)       This was done today at the wound care center                                                    Wound compression and edema control      Surepress left leg     Apply compression wrap to your affected Leg(s) from mid-foot to knee making sure to cover the heel  Apply in the morning and re-wrap as needed during the day if wrap becomes loose   Remove at bedtime and elevate legs or lie down      Avoid prolonged standing in one place      Elevate leg(s) above the level of the heart when sitting or as much as possible          Standing Status:   Future     Standing Expiration Date:   2/7/2023

## 2022-02-07 NOTE — PROGRESS NOTES
Patient ID: Pato Poon is a 62 y o  male Date of Birth 1960       Chief Complaint   Patient presents with    Follow Up Wound Care Visit     left lower extremity ulcer       Allergies:  Hydromorphone    Diagnosis:   Diagnosis ICD-10-CM Associated Orders   1  Chronic venous hypertension (idiopathic) with ulcer and inflammation of left lower extremity (CODE) ([facility])  I87 332 Wound cleansing and dressings     lidocaine (XYLOCAINE) 4 % topical solution 5 mL     Chemical Caut Of A Wound   2  Non-pressure chronic ulcer of left calf with other specified severity ([place] [address] )  L97 228    3  Morbid obesity with BMI of 50 0-59 9, adult ([place] [address] )  E66 01     Z68 43         Assessment and Plan :  · Continue compression with Circaids on RLE   · LLE wound continues to improve  · Chemically cauterized as below  · Continue wound management with Calmoseptine to periwound and Polymem to wound bed, see wound orders below  · Continue compression with Surepress  · No harsh cleansers such as alcohol, peroxide, or antibacterial soap, do not submerge in water  · Continue frequent elevation of leg and increase exercise/walking  · Continue eating protein and weight loss  · Followup in 2 week or call sooner with questions or concerns    Subjective:   Patient presents for followup of LLE venous ulcer  No new complaints or issues  Denies fevers or chills      The following portions of the patient's history were reviewed and updated as appropriate:   Patient Active Problem List   Diagnosis    Venous ulcer of left lower extremity with varicose veins (HCC)    Acute blood loss anemia    Bleeding from varicose vein    Essential hypertension    Chronic venous hypertension (idiopathic) with ulcer and inflammation of left lower extremity (CODE) ([facility] )    Morbid obesity with BMI of 50 0-59 9, adult ([place] [address] )    obstructive sleep apnea      Past Medical History:   Diagnosis Date    Anxiety     CPAP (continuous positive airway pressure) dependence     Hypertension     Sleep apnea     Venous stasis ulcers of both lower extremities (HCC)      Past Surgical History:   Procedure Laterality Date    HERNIA REPAIR      umbilical    MT ENDOVENOUS LASER, 1ST VEIN Left 8/12/2021    Procedure: ENDOVASCULAR LASER THERAPY (EVLT), Left GSV;  Surgeon: Zeeshan Looney MD;  Location: [facility];  Service: Vascular    TONSILLECTOMY       No family history on file  Social History     Socioeconomic History    Marital status: /Civil Union     Spouse name: None    Number of children: None    Years of education: None    Highest education level: None   Occupational History    None   Tobacco Use    Smoking status: Never Smoker    Smokeless tobacco: Never Used   Vaping Use    Vaping Use: Never used   Substance and Sexual Activity    Alcohol use:  Yes     Alcohol/week: 3 0 standard drinks     Types: 2 Cans of beer, 1 Shots of liquor per week     Comment: daily    Drug use: Never    Sexual activity: None   Other Topics Concern    None   Social History Narrative    None     Social Determinants of Health     Financial Resource Strain: Not on file   Food Insecurity: Not on file   Transportation Needs: Not on file   Physical Activity: Not on file   Stress: Not on file   Social Connections: Not on file   Intimate Partner Violence: Not on file   Housing Stability: Not on file       Current Outpatient Medications:     fexofenadine (ALLEGRA) 180 MG tablet, Take 1 tablet by mouth daily, Disp: , Rfl:     lisinopril (ZESTRIL) 20 mg tablet, , Disp: , Rfl:     lisinopril (ZESTRIL) 20 mg tablet, Take 20 mg by mouth 2 (two) times a day, Disp: , Rfl:     metoprolol succinate (TOPROL-XL) 100 mg 24 hr tablet, , Disp: , Rfl:     naproxen sodium (ALEVE) 220 MG tablet, Take 1 tablet by mouth every 8 (eight) hours, Disp: , Rfl:     oxyCODONE (ROXICODONE) 5 immediate release tablet, Take 1 tablet (5 mg total) by mouth every 6 (six) hours as needed for severe pain Max Daily Amount: 20 mg, Disp: 30 tablet, Rfl: 0    sertraline (ZOLOFT) 100 mg tablet, , Disp: , Rfl:   No current facility-administered medications for this visit  Review of Systems   Constitutional: Negative for appetite change, chills, fatigue, fever and unexpected weight change  HENT: Negative for congestion, hearing loss and postnasal drip  Respiratory: Negative for cough and shortness of breath  Cardiovascular: Positive for leg swelling  Musculoskeletal: Positive for gait problem  Skin: Positive for wound (LLE )  Negative for rash  Neurological: Negative for numbness  Hematological: Does not bruise/bleed easily  Psychiatric/Behavioral: Negative  Objective:  /79   Pulse 78   Temp 97 9 °F (36 6 °C)   Resp 18   Pain Score: 0-No pain     Physical Exam  Vitals reviewed  Constitutional:       General: He is not in acute distress  Appearance: Normal appearance  He is well-developed  He is morbidly obese  HENT:      Head: Normocephalic and atraumatic  Cardiovascular:      Rate and Rhythm: Normal rate  Pulmonary:      Effort: Pulmonary effort is normal    Musculoskeletal:         General: No deformity  Right lower leg: Edema present  Left lower leg: Edema present  Skin:     General: Skin is warm and dry  Findings: Wound (LLE) present  Comments: Beefy red granulation tissue  See wound assessment   Neurological:      General: No focal deficit present  Mental Status: He is alert and oriented to person, place, and time  Gait: Gait abnormal    Psychiatric:         Mood and Affect: Mood and affect normal          Behavior: Behavior normal  Behavior is cooperative  Wound 06/11/21 Venous Ulcer Pretibial Left (Active)   Wound Description Epithelialization; Beefy red;Hypergranulation 02/07/22 0917   Hui-wound Assessment Fragile; Hyperpigmented 02/07/22 0917   Wound Length (cm) 9 cm 02/07/22 0917   Wound Width (cm) 20 cm 02/07/22 4804   Wound Depth (cm) 0 1 cm 02/07/22 0917   Wound Surface Area (cm^2) 180 cm^2 02/07/22 0917   Wound Volume (cm^3) 18 cm^3 02/07/22 0917   Calculated Wound Volume (cm^3) 18 cm^3 02/07/22 0917   Change in Wound Size % 83 66 02/07/22 0917   Drainage Amount Small 02/07/22 0917   Drainage Description Serosanguineous 02/07/22 0917   Non-staged Wound Description Full thickness 02/07/22 0917   Treatments Irrigation with NSS 08/20/21 0914   Dressing Status Intact 02/07/22 0917                Chemical Caut Of A Wound     Date/Time 2/7/2022 11:53 AM     Performed by  John Simmons PA-C     Authorized by John Simmons PA-C       Associated Wounds:   Wound 06/11/21 Venous Ulcer Pretibial Left     Universal Protocol   Consent: Verbal consent obtained  Written consent obtained  Risks and benefits: risks, benefits and alternatives were discussed  Consent given by: patient  Time out: Immediately prior to procedure a "time out" was called to verify the correct patient, procedure, equipment, support staff and site/side marked as required  Patient understanding: patient states understanding of the procedure being performed  Patient identity confirmed: verbally with patient        Local anesthesia used: yes     Anesthesia   Local anesthesia used: yes  Local Anesthetic: topical anesthetic     Sedation   Patient sedated: no        Specimen: no    Culture: no   Procedure Details   Procedure Notes: After permission and placement of topical local anesthetic, 2 Silver nitrate stick(s) were used to cauterize the hypergranular tissue of the open wound  Normal saline was used to neutralize the reaction  A dressing was applied, see orders  Patient tolerated procedure well        Patient tolerance: Patient tolerated the procedure well with no immediate complications                      Wound Instructions:  Orders Placed This Encounter   Procedures    Wound cleansing and dressings     Wound cleansing and dressings         Left lower leg:     Wash your hands with soap and water  Remove old dressing, discard into plastic bag and place in trash  Cleanse the wound with the prophase  prior to applying a clean dressing  Do not use tissue or cotton balls  Do not scrub the wound  Pat dry using gauze      Ok to shower on dressing change days      Apply a barrier cream around the wound, only apply to intact skin  Calmoseptine applied today at wound center or anything with zinc will protect the intact skin  Apply Polymem (cut slits into polymem) to the wound bed, cover with ABD , rolled gauze and secure tape  Apply with a sure press for compression  Change dressing every 2 days (wife to do dressing changes)       This was done today at the wound care center                                                    Wound compression and edema control      Surepress left leg     Apply compression wrap to your affected Leg(s) from mid-foot to knee making sure to cover the heel  Apply in the morning and re-wrap as needed during the day if wrap becomes loose  Remove at bedtime and elevate legs or lie down      Avoid prolonged standing in one place      Elevate leg(s) above the level of the heart when sitting or as much as possible          Standing Status:   Future     Standing Expiration Date:   2/7/2023    Chemical Caut Of A Wound     This order was created via procedure documentation       Olinda Maki PA-C, [facility]      Portions of the record may have been created with voice recognition software  Occasional wrong word or "sound alike" substitutions may have occurred due to the inherent limitations of voice recognition software  Read the chart carefully and recognize, using context, where substitutions have occurred

## 2022-02-17 ENCOUNTER — OFFICE VISIT (OUTPATIENT)
Dept: SLEEP CENTER | Facility: CLINIC | Age: 62
End: 2022-02-17
Payer: COMMERCIAL

## 2022-02-17 VITALS
BODY MASS INDEX: 45.1 KG/M2 | WEIGHT: 315 LBS | HEIGHT: 70 IN | SYSTOLIC BLOOD PRESSURE: 170 MMHG | DIASTOLIC BLOOD PRESSURE: 96 MMHG

## 2022-02-17 DIAGNOSIS — Z99.89 OBSTRUCTIVE SLEEP APNEA TREATED WITH CONTINUOUS POSITIVE AIRWAY PRESSURE (CPAP): Primary | ICD-10-CM

## 2022-02-17 DIAGNOSIS — G47.33 OBSTRUCTIVE SLEEP APNEA TREATED WITH CONTINUOUS POSITIVE AIRWAY PRESSURE (CPAP): Primary | ICD-10-CM

## 2022-02-17 PROCEDURE — 99214 OFFICE O/P EST MOD 30 MIN: CPT | Performed by: NURSE PRACTITIONER

## 2022-02-17 NOTE — PATIENT INSTRUCTIONS
1   Continue use of CPAP equipment nightly  2  Continue to clean your equipment, as discussed  3  Contact the Sleep [address] [place] [address] with any questions or concerns prior to your next visit, as needed  4  Schedule visit for follow-up in 1 year      Nursing Support:  When: Monday through Friday 7A-5PM except holidays  Where: Our direct line is [phone]  If you are having a true emergency please call 911  In the event that the line is busy or it is after hours please leave a voice message and we will return your call  Please speak clearly, leaving your full name, birth date, best number to reach you and the reason for your call  Medication refills: We will need the name of the medication, the dosage, the ordering provider, whether you get a 30 or 90 day refill, and the pharmacy name and address  Medications will be ordered by the provider only  Nurses cannot call in prescriptions  Please allow 7 days for medication refills  Physician requested updates: If your provider requested that you call with an update after starting medication, please be ready to provide us the medication and dosage, what time you take your medication, the time you attempt to fall asleep, time you fall asleep, when you wake up, and what time you get out of bed  Sleep Study Results: We will contact you with sleep study results and/or next steps after the physician has reviewed your testing

## 2022-02-17 NOTE — PROGRESS NOTES
Progress Note - Candie Bermeo [age] y o  male   :1960, MRN: [id number]  2022          Follow Up Evaluation / Problem:  Severe Obstructive Sleep Apnea  Morbid Obesity  HTN      Thank you for the opportunity of participating in the evaluation and care of this patient in the Sleep Clinic at [facility]  HPI: Nino Kanner is a 62y o  year old male  The patient presents for follow up of severe obstructive sleep apnea  He was diagnosed with MARILIN approximately 10 years ago and has used CPAP since diagnosis  His CPAP equipment stopped working  He completed a home sleep study after his last visit and presents to review results and compliance with new CPAP equipment  Review of Systems      Genitourinary none   Cardiology none   Gastrointestinal none   Neurology none   Constitutional none   Integumentary none   Psychiatry none   Musculoskeletal none   Pulmonary snoring   ENT none   Endocrine none   Hematological none       Current Outpatient Medications:     fexofenadine (ALLEGRA) 180 MG tablet, Take 1 tablet by mouth daily, Disp: , Rfl:     lisinopril (ZESTRIL) 20 mg tablet, , Disp: , Rfl:     lisinopril (ZESTRIL) 20 mg tablet, Take 20 mg by mouth 2 (two) times a day, Disp: , Rfl:     metoprolol succinate (TOPROL-XL) 100 mg 24 hr tablet, , Disp: , Rfl:     naproxen sodium (ALEVE) 220 MG tablet, Take 1 tablet by mouth every 8 (eight) hours, Disp: , Rfl:     oxyCODONE (ROXICODONE) 5 immediate release tablet, Take 1 tablet (5 mg total) by mouth every 6 (six) hours as needed for severe pain Max Daily Amount: 20 mg, Disp: 30 tablet, Rfl: 0    sertraline (ZOLOFT) 100 mg tablet, , Disp: , Rfl:     [place] Sleepiness Scale  Sitting and reading: Would never doze  Watching TV: Would never doze  Sitting, inactive in a public place (e g  a theatre or a meeting):  Would never doze  As a passenger in a car for an hour without a break: Would never doze  Lying down to rest in the afternoon when circumstances permit: Moderate chance of dozing  Sitting and talking to someone: Would never doze  Sitting quietly after a lunch without alcohol: Would never doze  In a car, while stopped for a few minutes in traffic: Would never doze  Total score: 2              Vitals:    02/17/22 1326   BP: 170/96   BP Location: Left arm   Patient Position: Sitting   Cuff Size: Large   Weight: (!) 178 kg (393 lb)   Height: 5' 10" (1 778 m)       Body mass index is 56 39 kg/m²  EPWORTH SLEEPINESS SCORE  Total score: 2      Past History Since Last Sleep Center Visit:   He denies any changes to his health since his last visit  He received his new CPAP equipment and reports that he uses it every night  He uses a nasal pillow mask and feel it is comfortable  He wakes up at times feeling like he is not getting enough air through the mask and feels the starting pressure could be a little stronger  Results of the home sleep study, completed on 12/19/21, are as follows: The patient had a total of 228 respiratory events made up of 52 obstructive apneas, 18 central apneas, 0 mixed apneas and 158 hypopneas resulting in a respiratory event index (GLENYS) of 85 8  The lowest SpO2 recorded is 82%  IMPRESSION:   Mr Fredy Fernández had an increased number of sleep related respiratory events (GLENYS - 85 8) which is consistent with severe obstructive sleep apnea  Oxygenation was relatively well controlled during this study  Therefore, I recommend a trial of auto-titrating CPAP 5-20 cc of H2O pressure with heated humidification  Compliance should be evaluated in 1-2 months       The review of systems and following portions of the patient's history were reviewed and updated as appropriate: allergies, current medications, past family history, past medical history, past social history, past surgical history, and problem list         OBJECTIVE  Equipment set up date:  1/13/22  PAP Pressure: Nasal AutoPAP using a lower limit of 14 cm and an upper limit of 20 cm of water pressure  Type of mask used: nasal pillows  DME Provider: [employer]    Physical Exam:     General Appearance:   Alert, cooperative, no distress, appears stated age, morbidly obese     Head:   Normocephalic, without obvious abnormality, atraumatic     Eyes:   PERRL, conjunctiva/corneas clear, EOM's intact          Nose:  Nares normal, septum midline, no drainage or sinus tenderness           Throat:  Lips, teeth and gums normal; tongue large in size and midline mucosa moist with low-lying soft palatal tissue, uvula barely visualized, tonsils not visualized, Mallampati class 4       Neck:  Supple, symmetrical, trachea midline, no adenopathy; Thyroid: No enlargement, tenderness or nodules; no carotid bruit or JVD     Lungs:      Clear to auscultation bilaterally, respirations unlabored     Heart:   Regular rate and rhythm, S1 and S2 normal, no murmur, rub or gallop       Extremities:  Extremities normal, atraumatic, no cyanosis, wearing compression sleeve on right lower extremity and compression bandage on left LE       Skin:  Skin color, texture, turgor normal, no rashes or lesions       Neurologic:  No focal deficits noted       ASSESSMENT / PLAN    1  Obstructive sleep apnea treated with continuous positive airway pressure (CPAP)  PAP DME Resupply/Reorder    Resmed DME Pressure Change           Counseling / Coordination of Care  Total clinic time spent today 27 minutes  Greater than 50% of total time was spent with the patient and / or family counseling and / or coordination of care  A description of the counseling / coordination of care:     Impressions, Diagnostic results, Prognosis, Instructions for management, Risks and benefits of treatment, Patient and family education, Risk factor reductions and Importance of compliance with treatment    Today I reviewed the patient's compliance data  he has been able to use the equipment 100% of all days recorded  Average usage was 4 or more hours 100% of all days recorded  The estimated AHI is 4 4 abnormal breathing events per hour, including 2 1 OA and 1 3 CA  The patient feels they benefit from the use of PAP equipment and would like to continue PAP therapy  Response to treatment has been excellent  A prescription for supplies has been provided for the next year  He reports that he is using the [employer] mary grace and views his compliance and nightly events  He will continue using this equipment at the settings noted above for the next 12 months  At that timehe will then return for a routine follow-up evaluation  I have asked the patient to contact the Sleep [address] [facility] if he encounters any difficulties prior to that time  The following instructions have been given to the patient today:    Patient Instructions   1  Continue use of CPAP equipment nightly  2  Continue to clean your equipment, as discussed  3  Contact the Sleep [address] [place] [address] with any questions or concerns prior to your next visit, as needed  4  Schedule visit for follow-up in 1 year      Nursing Support:  When: Monday through Friday 7A-5PM except holidays  Where: Our direct line is [phone]  If you are having a true emergency please call 911  In the event that the line is busy or it is after hours please leave a voice message and we will return your call  Please speak clearly, leaving your full name, birth date, best number to reach you and the reason for your call  Medication refills: We will need the name of the medication, the dosage, the ordering provider, whether you get a 30 or 90 day refill, and the pharmacy name and address  Medications will be ordered by the provider only  Nurses cannot call in prescriptions  Please allow 7 days for medication refills  Physician requested updates:  If your provider requested that you call with an update after starting medication, please be ready to provide us the medication and dosage, what time you take your medication, the time you attempt to fall asleep, time you fall asleep, when you wake up, and what time you get out of bed  Sleep Study Results: We will contact you with sleep study results and/or next steps after the physician has reviewed your testing        Ivis Edmonds, [address] [facility]

## 2022-02-18 ENCOUNTER — TELEPHONE (OUTPATIENT)
Dept: SLEEP CENTER | Facility: CLINIC | Age: 62
End: 2022-02-18

## 2022-02-22 ENCOUNTER — OFFICE VISIT (OUTPATIENT)
Dept: INTERNAL MEDICINE CLINIC | Facility: CLINIC | Age: 62
End: 2022-02-22
Payer: COMMERCIAL

## 2022-02-22 VITALS
WEIGHT: 315 LBS | HEART RATE: 73 BPM | SYSTOLIC BLOOD PRESSURE: 142 MMHG | TEMPERATURE: 98 F | HEIGHT: 70 IN | OXYGEN SATURATION: 96 % | DIASTOLIC BLOOD PRESSURE: 86 MMHG | BODY MASS INDEX: 45.1 KG/M2

## 2022-02-22 DIAGNOSIS — G47.33 OBSTRUCTIVE SLEEP APNEA TREATED WITH CONTINUOUS POSITIVE AIRWAY PRESSURE (CPAP): ICD-10-CM

## 2022-02-22 DIAGNOSIS — Z13.6 ENCOUNTER FOR LIPID SCREENING FOR CARDIOVASCULAR DISEASE: ICD-10-CM

## 2022-02-22 DIAGNOSIS — Z13.220 ENCOUNTER FOR LIPID SCREENING FOR CARDIOVASCULAR DISEASE: ICD-10-CM

## 2022-02-22 DIAGNOSIS — Z99.89 OBSTRUCTIVE SLEEP APNEA TREATED WITH CONTINUOUS POSITIVE AIRWAY PRESSURE (CPAP): ICD-10-CM

## 2022-02-22 DIAGNOSIS — I10 ESSENTIAL HYPERTENSION: Primary | ICD-10-CM

## 2022-02-22 DIAGNOSIS — E66.01 MORBID OBESITY WITH BMI OF 50.0-59.9, ADULT (HCC): ICD-10-CM

## 2022-02-22 DIAGNOSIS — R73.01 ABNORMAL FASTING GLUCOSE: ICD-10-CM

## 2022-02-22 DIAGNOSIS — F41.9 ANXIETY: ICD-10-CM

## 2022-02-22 DIAGNOSIS — I87.332 CHRONIC VENOUS HYPERTENSION (IDIOPATHIC) WITH ULCER AND INFLAMMATION OF LEFT LOWER EXTREMITY (CODE) (HCC): ICD-10-CM

## 2022-02-22 DIAGNOSIS — Z11.4 ENCOUNTER FOR SCREENING FOR HIV: ICD-10-CM

## 2022-02-22 DIAGNOSIS — Z12.5 PROSTATE CANCER SCREENING: ICD-10-CM

## 2022-02-22 DIAGNOSIS — Z11.59 ENCOUNTER FOR HEPATITIS C SCREENING TEST FOR LOW RISK PATIENT: ICD-10-CM

## 2022-02-22 PROCEDURE — 1036F TOBACCO NON-USER: CPT | Performed by: INTERNAL MEDICINE

## 2022-02-22 PROCEDURE — 3725F SCREEN DEPRESSION PERFORMED: CPT | Performed by: INTERNAL MEDICINE

## 2022-02-22 PROCEDURE — 99214 OFFICE O/P EST MOD 30 MIN: CPT | Performed by: INTERNAL MEDICINE

## 2022-02-22 PROCEDURE — 3008F BODY MASS INDEX DOCD: CPT | Performed by: INTERNAL MEDICINE

## 2022-02-22 RX ORDER — LISINOPRIL AND HYDROCHLOROTHIAZIDE 20; 12.5 MG/1; MG/1
2 TABLET ORAL DAILY
Qty: 90 TABLET | Refills: 3 | Status: SHIPPED | OUTPATIENT
Start: 2022-02-22 | End: 2022-08-21

## 2022-02-22 RX ORDER — SERTRALINE HYDROCHLORIDE 100 MG/1
100 TABLET, FILM COATED ORAL DAILY
Qty: 90 TABLET | Refills: 5 | Status: SHIPPED | OUTPATIENT
Start: 2022-02-22 | End: 2022-05-23

## 2022-02-22 RX ORDER — METOPROLOL SUCCINATE 100 MG/1
100 TABLET, EXTENDED RELEASE ORAL DAILY
Qty: 90 TABLET | Refills: 5 | Status: SHIPPED | OUTPATIENT
Start: 2022-02-22 | End: 2022-05-23

## 2022-02-28 ENCOUNTER — OFFICE VISIT (OUTPATIENT)
Dept: WOUND CARE | Facility: CLINIC | Age: 62
End: 2022-02-28
Payer: COMMERCIAL

## 2022-02-28 VITALS
HEART RATE: 77 BPM | DIASTOLIC BLOOD PRESSURE: 77 MMHG | TEMPERATURE: 96.9 F | SYSTOLIC BLOOD PRESSURE: 160 MMHG | RESPIRATION RATE: 20 BRPM

## 2022-02-28 DIAGNOSIS — E66.01 MORBID OBESITY WITH BMI OF 50.0-59.9, ADULT (HCC): ICD-10-CM

## 2022-02-28 DIAGNOSIS — L97.228 NON-PRESSURE CHRONIC ULCER OF LEFT CALF WITH OTHER SPECIFIED SEVERITY (HCC): ICD-10-CM

## 2022-02-28 DIAGNOSIS — I87.332 CHRONIC VENOUS HYPERTENSION (IDIOPATHIC) WITH ULCER AND INFLAMMATION OF LEFT LOWER EXTREMITY (CODE) (HCC): Primary | ICD-10-CM

## 2022-02-28 PROCEDURE — 11042 DBRDMT SUBQ TIS 1ST 20SQCM/<: CPT | Performed by: ORTHOPAEDIC SURGERY

## 2022-02-28 RX ORDER — LIDOCAINE HYDROCHLORIDE 40 MG/ML
5 SOLUTION TOPICAL ONCE
Status: COMPLETED | OUTPATIENT
Start: 2022-02-28 | End: 2022-02-28

## 2022-02-28 RX ADMIN — LIDOCAINE HYDROCHLORIDE 5 ML: 40 SOLUTION TOPICAL at 09:22

## 2022-02-28 NOTE — PATIENT INSTRUCTIONS
Orders Placed This Encounter   Procedures    Wound cleansing and dressings     Left lower leg:     Wash your hands with soap and water  Remove old dressing, discard into plastic bag and place in trash  Cleanse the wound with the prophase  prior to applying a clean dressing  Do not use tissue or cotton balls  Do not scrub the wound  Pat dry using gauze      Ok to shower on dressing change days      Apply a barrier cream around the wound, only apply to intact skin  Calmoseptine applied today at wound center or anything with zinc will protect the intact skin  Apply Polymem (cut slits into polymem) to the wound bed, cover with ABD , rolled gauze and secure tape  Apply with a spandagrip size F for compression  Change dressing every 2 days (wife to do dressing changes)        This was done today at the wound care center                               Standing Status:   Future     Standing Expiration Date:   2/28/2023    Wound compression and edema control     Spandagrip F left leg     Apply compression wrap to your affected Leg(s) from mid-foot to knee making sure to cover the heel  Apply in the morning and re-wrap as needed during the day if wrap becomes loose   Remove at bedtime and elevate legs or lie down      Avoid prolonged standing in one place      Elevate leg(s) above the level of the heart when sitting or as much as possible     Standing Status:   Future     Standing Expiration Date:   2/28/2023

## 2022-03-01 NOTE — PROGRESS NOTES
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 ([place] [address] )  L97 228    3  Morbid obesity with BMI of 50 0-59 9, adult ([place] [address] )  E66 01     Z68 43         Assessment and Plan :  · Continue compression with Circaids on RLE   · LLE wound continues to decrease in size   · Surgically debrided as below  · Continue wound management with Calmoseptine to periwound and Polymem to wound bed, see wound orders below  · Change compression to Tubigrip  · No harsh cleansers such as alcohol, peroxide, or antibacterial soap, do not submerge in water  · Continue frequent elevation of leg and increase exercise/walking  · Continue to consume protein   · Followup in 2 week or call sooner with questions or concerns    Subjective:   Patient presents for followup of LLE venous ulcer  Pt c/o surepress embedding into skin  Pt has been applying Polymem to wound bed        The following portions of the patient's history were reviewed and updated as appropriate:   Patient Active Problem List   Diagnosis    Venous ulcer of left lower extremity with varicose veins (HCC)    Acute blood loss anemia    Bleeding from varicose vein    Essential hypertension    Chronic venous hypertension (idiopathic) with ulcer and inflammation of left lower extremity (CODE) ([place] [address] )    Morbid obesity with BMI of 50 0-59 9, adult ([place] [address] )    Obstructive sleep apnea treated with continuous positive airway pressure (CPAP)     Past Medical History:   Diagnosis Date  Anxiety     CPAP (continuous positive airway pressure) dependence     Hypertension     Sleep apnea     Venous stasis ulcers of both lower extremities (HCC)      Past Surgical History:   Procedure Laterality Date    HERNIA REPAIR      umbilical    [place] ENDOVENOUS LASER, 1ST VEIN Left 8/12/2021    Procedure: ENDOVASCULAR LASER THERAPY (EVLT), Left GSV;  Surgeon: Waleska Lake MD;  Location: [facility];  Service: Vascular    TONSILLECTOMY       No family history on file  Social History     Socioeconomic History    Marital status: /Civil Union     Spouse name: None    Number of children: None    Years of education: None    Highest education level: None   Occupational History    None   Tobacco Use    Smoking status: Never Smoker    Smokeless tobacco: Never Used   Vaping Use    Vaping Use: Never used   Substance and Sexual Activity    Alcohol use:  Yes     Alcohol/week: 3 0 standard drinks     Types: 2 Cans of beer, 1 Shots of liquor per week     Comment: daily    Drug use: Never    Sexual activity: None   Other Topics Concern    None   Social History Narrative    None     Social Determinants of Health     Financial Resource Strain: Not on file   Food Insecurity: Not on file   Transportation Needs: Not on file   Physical Activity: Not on file   Stress: Not on file   Social Connections: Not on file   Intimate Partner Violence: Not on file   Housing Stability: Not on file       Current Outpatient Medications:     fexofenadine (ALLEGRA) 180 MG tablet, Take 1 tablet by mouth daily, Disp: , Rfl:     lisinopril-hydrochlorothiazide (PRINZIDE,ZESTORETIC) 20-12 5 MG per tablet, Take 2 tablets by mouth daily, Disp: 90 tablet, Rfl: 3    metoprolol succinate (TOPROL-XL) 100 mg 24 hr tablet, Take 1 tablet (100 mg total) by mouth daily, Disp: 90 tablet, Rfl: 5    naproxen sodium (ALEVE) 220 MG tablet, Take 1 tablet by mouth every 8 (eight) hours, Disp: , Rfl:     sertraline (ZOLOFT) 100 mg tablet, Take 1 tablet (100 mg total) by mouth daily, Disp: 90 tablet, Rfl: 5  No current facility-administered medications for this visit  Review of Systems   Constitutional: Negative for appetite change, chills, fatigue, fever and unexpected weight change  HENT: Negative for congestion, hearing loss and postnasal drip  Respiratory: Negative for cough and shortness of breath  Cardiovascular: Positive for leg swelling  Musculoskeletal: Positive for gait problem  Skin: Positive for wound (LLE )  Negative for rash  Neurological: Negative for numbness  Hematological: Does not bruise/bleed easily  Psychiatric/Behavioral: Negative  Objective:  /77   Pulse 77   Temp (!) 96 9 °F (36 1 °C)   Resp 20   Pain Score: 0-No pain     Physical Exam  Vitals reviewed  Constitutional:       General: He is not in acute distress  Appearance: Normal appearance  He is well-developed  He is morbidly obese  HENT:      Head: Normocephalic and atraumatic  Cardiovascular:      Rate and Rhythm: Normal rate  Pulmonary:      Effort: Pulmonary effort is normal    Musculoskeletal:         General: No deformity  Right lower leg: Edema present  Left lower leg: Edema present  Skin:     General: Skin is warm and dry  Findings: Wound (LLE) present  Comments: Beefy red granulation tissue with biofilm, macerated wound edges, ecchymosis on periwound from wrap  See wound assessment   Neurological:      General: No focal deficit present  Mental Status: He is alert and oriented to person, place, and time  Gait: Gait abnormal    Psychiatric:         Mood and Affect: Mood and affect normal          Behavior: Behavior normal  Behavior is cooperative  Wound 06/11/21 Venous Ulcer Pretibial Left (Active)   Wound Description Epithelialization; Beefy red;Hypergranulation 02/28/22 0921   Hui-wound Assessment Fragile; Hyperpigmented 02/28/22 0921   Wound Length (cm) 0 7 cm 02/28/22 4604 Wound Width (cm) 1 cm 02/28/22 0921   Wound Depth (cm) 0 1 cm 02/28/22 0921   Wound Surface Area (cm^2) 0 7 cm^2 02/28/22 0921   Wound Volume (cm^3) 0 07 cm^3 02/28/22 0921   Calculated Wound Volume (cm^3) 0 07 cm^3 02/28/22 0921   Change in Wound Size % 99 94 02/28/22 0921   Drainage Amount Small 02/07/22 0917   Drainage Description Serosanguineous 02/28/22 0921   Non-staged Wound Description Full thickness 02/28/22 0921   Treatments Irrigation with NSS 08/20/21 0914   Dressing Status Intact 02/28/22 0921        Debridement   Wound 06/11/21 Venous Ulcer Pretibial Left    Universal Protocol:  Consent: Verbal consent obtained  Written consent obtained  Risks and benefits: risks, benefits and alternatives were discussed  Consent given by: patient  Time out: Immediately prior to procedure a "time out" was called to verify the correct patient, procedure, equipment, support staff and site/side marked as required  Patient understanding: patient states understanding of the procedure being performed  Patient identity confirmed: verbally with patient      Performed by: PA  Debridement type: surgical  Level of debridement: subcutaneous tissue  Pain control: lidocaine 4%  Post-debridement measurements  Length (cm): 0 7  Width (cm): 1  Depth (cm): 0 2  Percent debrided: 100%  Surface Area (cm^2): 0 7  Area debrided (cm^2): 0 7  Volume (cm^3): 0 14  Tissue and other material debrided: hypergranulation and subcutaneous tissue  Devitalized tissue debrided: fibrin  Instrument(s) utilized: curette  Bleeding: small  Hemostasis obtained with: pressure and silver nitrate  Procedural pain (0-10): 0  Post-procedural pain: 0   Response to treatment: procedure was tolerated well                     Wound Instructions:  Orders Placed This Encounter   Procedures    Wound cleansing and dressings     Left lower leg:     Wash your hands with soap and water  Remove old dressing, discard into plastic bag and place in trash    Cleanse the wound with the prophase  prior to applying a clean dressing  Do not use tissue or cotton balls  Do not scrub the wound  Pat dry using gauze      Ok to shower on dressing change days      Apply a barrier cream around the wound, only apply to intact skin  Calmoseptine applied today at wound center or anything with zinc will protect the intact skin  Apply Polymem (cut slits into polymem) to the wound bed, cover with ABD , rolled gauze and secure tape  Apply with a spandagrip size F for compression  Change dressing every 2 days (wife to do dressing changes)        This was done today at the wound care center                               Standing Status:   Future     Standing Expiration Date:   2/28/2023    Wound compression and edema control     Spandagrip F left leg     Apply compression wrap to your affected Leg(s) from mid-foot to knee making sure to cover the heel  Apply in the morning and re-wrap as needed during the day if wrap becomes loose  Remove at bedtime and elevate legs or lie down      Avoid prolonged standing in one place      Elevate leg(s) above the level of the heart when sitting or as much as possible     Standing Status:   Future     Standing Expiration Date:   2/28/2023       Zach Gomez PA-C, [facility]      Portions of the record may have been created with voice recognition software  Occasional wrong word or "sound alike" substitutions may have occurred due to the inherent limitations of voice recognition software  Read the chart carefully and recognize, using context, where substitutions have occurred

## 2022-03-14 ENCOUNTER — OFFICE VISIT (OUTPATIENT)
Dept: WOUND CARE | Facility: CLINIC | Age: 62
End: 2022-03-14
Payer: COMMERCIAL

## 2022-03-14 VITALS
RESPIRATION RATE: 18 BRPM | TEMPERATURE: 98.9 F | SYSTOLIC BLOOD PRESSURE: 171 MMHG | HEART RATE: 87 BPM | DIASTOLIC BLOOD PRESSURE: 95 MMHG

## 2022-03-14 DIAGNOSIS — E66.01 MORBID OBESITY WITH BMI OF 50.0-59.9, ADULT (HCC): ICD-10-CM

## 2022-03-14 DIAGNOSIS — L97.228 NON-PRESSURE CHRONIC ULCER OF LEFT CALF WITH OTHER SPECIFIED SEVERITY (HCC): ICD-10-CM

## 2022-03-14 DIAGNOSIS — I87.332 CHRONIC VENOUS HYPERTENSION (IDIOPATHIC) WITH ULCER AND INFLAMMATION OF LEFT LOWER EXTREMITY (CODE) (HCC): Primary | ICD-10-CM

## 2022-03-14 PROCEDURE — 17250 CHEM CAUT OF GRANLTJ TISSUE: CPT | Performed by: ORTHOPAEDIC SURGERY

## 2022-03-14 RX ORDER — LIDOCAINE HYDROCHLORIDE 40 MG/ML
5 SOLUTION TOPICAL ONCE
Status: COMPLETED | OUTPATIENT
Start: 2022-03-14 | End: 2022-03-14

## 2022-03-14 RX ADMIN — LIDOCAINE HYDROCHLORIDE 5 ML: 40 SOLUTION TOPICAL at 09:20

## 2022-03-14 NOTE — PATIENT INSTRUCTIONS
Orders Placed This Encounter   Procedures    Wound cleansing and dressings     Left lower leg:     Wash your hands with soap and water  Remove old dressing, discard into plastic bag and place in trash  Cleanse the wound with the prophase  prior to applying a clean dressing  Do not use tissue or cotton balls  Do not scrub the wound  Pat dry using gauze      Ok to shower on dressing change days      Apply a barrier cream around the wound, only apply to intact skin  Calmoseptine applied today at wound center or anything with zinc will protect the intact skin  Apply Polymem (cut slits into polymem) to the wound bed, cover with ABD , rolled gauze and secure tape  Apply sureprep for compression  Change dressing every 2 days (wife to do dressing changes)        This was done today at the wound care center                                     Standing Status:   Future     Standing Expiration Date:   3/14/2023    Wound compression and edema control     Surepress    Apply compression wrap to your affected Leg(s) from mid-foot to knee making sure to cover the heel  Apply in the morning and re-wrap as needed during the day if wrap becomes loose  Remove at bedtime and elevate legs or lie down  Avoid prolonged standing in one place  Elevate leg(s) above the level of the heart when sitting or as much as possible       Standing Status:   Future     Standing Expiration Date:   3/14/2023

## 2022-03-14 NOTE — PROGRESS NOTES
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 ([facility] [address] )  L97 228    3  Morbid obesity with BMI of 50 0-59 9, adult [facility] )  E66 01     Z68 43         Assessment and Plan :  · Continue compression with Circaids on RLE   · LLE wound continues to decrease in size   · Chemically Cauterized as below  · Continue wound management with Calmoseptine to periwound and Polymem to wound bed, see wound orders below  · Change compression to Surepress  · No harsh cleansers such as alcohol, peroxide, or antibacterial soap, do not submerge in water  · Continue frequent elevation of leg and increase exercise/walking  · Continue to consume protein and loose  · Followup in 2 week or call sooner with questions or concerns    Subjective:   Patient presents for followup of LLE venous ulcer  Pt c/o increased edema and size of wounds due to less compression with Tubi  Pt was changed to Tubi from Surepress due to wrap being imbedded into skin  Pt has been applying Polymem to wound bed          The following portions of the patient's history were reviewed and updated as appropriate:   Patient Active Problem List   Diagnosis    Venous ulcer of left lower extremity with varicose veins (HCC)    Acute blood loss anemia    Bleeding from varicose vein    Essential hypertension    Chronic venous hypertension (idiopathic) with ulcer and inflammation of left lower extremity (CODE) ([facility] [address] )    Morbid obesity with BMI of 50 0-59 9, adult ([facility] [address] )    Obstructive sleep apnea treated with continuous positive airway pressure (CPAP)     Past Medical History:   Diagnosis Date    Anxiety     CPAP (continuous positive airway pressure) dependence     Hypertension     Sleep apnea     Venous stasis ulcers of both lower extremities (HCC)      Past Surgical History:   Procedure Laterality Date    HERNIA REPAIR      umbilical    [place] ENDOVENOUS LASER, 1ST VEIN Left 8/12/2021    Procedure: ENDOVASCULAR LASER THERAPY (EVLT), Left GSV;  Surgeon: Waleska Lake MD;  Location: [facility];  Service: Vascular    TONSILLECTOMY       No family history on file  Social History     Socioeconomic History    Marital status: /Civil Union     Spouse name: None    Number of children: None    Years of education: None    Highest education level: None   Occupational History    None   Tobacco Use    Smoking status: Never Smoker    Smokeless tobacco: Never Used   Vaping Use    Vaping Use: Never used   Substance and Sexual Activity    Alcohol use:  Yes     Alcohol/week: 3 0 standard drinks     Types: 2 Cans of beer, 1 Shots of liquor per week     Comment: daily    Drug use: Never    Sexual activity: None   Other Topics Concern    None   Social History Narrative    None     Social Determinants of Health     Financial Resource Strain: Not on file   Food Insecurity: Not on file   Transportation Needs: Not on file   Physical Activity: Not on file   Stress: Not on file   Social Connections: Not on file   Intimate Partner Violence: Not on file   Housing Stability: Not on file       Current Outpatient Medications:     fexofenadine (ALLEGRA) 180 MG tablet, Take 1 tablet by mouth daily, Disp: , Rfl:     lisinopril-hydrochlorothiazide (PRINZIDE,ZESTORETIC) 20-12 5 MG per tablet, Take 2 tablets by mouth daily, Disp: 90 tablet, Rfl: 3    metoprolol succinate (TOPROL-XL) 100 mg 24 hr tablet, Take 1 tablet (100 mg total) by mouth daily, Disp: 90 tablet, Rfl: 5    naproxen sodium (ALEVE) 220 MG tablet, Take 1 tablet by mouth every 8 (eight) hours, Disp: , Rfl:     sertraline (ZOLOFT) 100 mg tablet, Take 1 tablet (100 mg total) by mouth daily, Disp: 90 tablet, Rfl: 5  No current facility-administered medications for this visit  Review of Systems   Constitutional: Negative for appetite change, chills, fatigue, fever and unexpected weight change  HENT: Negative for congestion, hearing loss and postnasal drip  Respiratory: Negative for cough and shortness of breath  Cardiovascular: Positive for leg swelling  Musculoskeletal: Positive for gait problem  Skin: Positive for wound (LLE )  Negative for rash  Neurological: Negative for numbness  Hematological: Does not bruise/bleed easily  Psychiatric/Behavioral: Negative  Objective:  BP (!) 171/95   Pulse 87   Temp 98 9 °F (37 2 °C)   Resp 18   Pain Score: 0-No pain     Physical Exam  Vitals reviewed  Constitutional:       General: He is not in acute distress  Appearance: Normal appearance  He is well-developed  He is morbidly obese  HENT:      Head: Normocephalic and atraumatic  Cardiovascular:      Rate and Rhythm: Normal rate  Pulmonary:      Effort: Pulmonary effort is normal    Musculoskeletal:         General: No deformity  Right lower leg: Edema present  Left lower leg: Edema present  Skin:     General: Skin is warm and dry  Findings: Wound (LLE) present  Comments: Beefy red hypergranulated tissue with biofilm, macerated wound edges  See wound assessment   Neurological:      General: No focal deficit present  Mental Status: He is alert and oriented to person, place, and time  Gait: Gait abnormal    Psychiatric:         Mood and Affect: Mood and affect normal          Behavior: Behavior normal  Behavior is cooperative               Wound 06/11/21 Venous Ulcer Pretibial Left (Active)   Wound Image Images linked 03/14/22 0916   Wound Description Beefy red;Epithelialization 03/14/22 0916   Hui-wound Assessment Fragile; Hyperpigmented 03/14/22 0916   Wound Length (cm) 7 6 cm 03/14/22 0916   Wound Width (cm) 12 4 cm 03/14/22 0916   Wound Depth (cm) 0 1 cm 03/14/22 0916   Wound Surface Area (cm^2) 94 24 cm^2 03/14/22 0916   Wound Volume (cm^3) 9 424 cm^3 03/14/22 0916   Calculated Wound Volume (cm^3) 9 42 cm^3 03/14/22 0916   Change in Wound Size % 91 45 03/14/22 0916   Drainage Amount Moderate 03/14/22 0916   Drainage Description Serosanguineous 03/14/22 0916   Non-staged Wound Description Full thickness 03/14/22 0916   Dressing Status Intact 03/14/22 0916             Chemical Caut Of A Wound     Date/Time 3/14/2022 10:03 AM     Performed by  Babatunde Holloway PA-C     Authorized by Babatunde Holloway PA-C      Universal Protocol   Consent: Verbal consent obtained  Written consent obtained  Risks and benefits: risks, benefits and alternatives were discussed  Consent given by: patient  Time out: Immediately prior to procedure a "time out" was called to verify the correct patient, procedure, equipment, support staff and site/side marked as required  Patient understanding: patient states understanding of the procedure being performed  Patient identity confirmed: verbally with patient        Local anesthesia used: yes     Anesthesia   Local anesthesia used: yes  Local Anesthetic: topical anesthetic     Sedation   Patient sedated: no        Specimen: no    Culture: no   Procedure Details   Procedure Notes: After permission and placement of topical local anesthetic, 2 Silver nitrate stick(s) were used to cauterize the hypergranular tissue of the open wound  Normal saline was used to neutralize the reaction  A dressing was applied, see orders  Patient tolerated procedure well        Patient tolerance: Patient tolerated the procedure well with no immediate complications                Wound Instructions:  Orders Placed This Encounter   Procedures    Wound cleansing and dressings     Left lower leg:     Wash your hands with soap and water  Remove old dressing, discard into plastic bag and place in trash  Cleanse the wound with the prophase  prior to applying a clean dressing  Do not use tissue or cotton balls  Do not scrub the wound  Pat dry using gauze      Ok to shower on dressing change days      Apply a barrier cream around the wound, only apply to intact skin  Calmoseptine applied today at wound center or anything with zinc will protect the intact skin  Apply Polymem (cut slits into polymem) to the wound bed, cover with ABD , rolled gauze and secure tape  Apply sureprep for compression  Change dressing every 2 days (wife to do dressing changes)        This was done today at the wound care center                                     Standing Status:   Future     Standing Expiration Date:   3/14/2023    Wound compression and edema control     Surepress    Apply compression wrap to your affected Leg(s) from mid-foot to knee making sure to cover the heel  Apply in the morning and re-wrap as needed during the day if wrap becomes loose  Remove at bedtime and elevate legs or lie down  Avoid prolonged standing in one place  Elevate leg(s) above the level of the heart when sitting or as much as possible  Standing Status:   Future     Standing Expiration Date:   3/14/2023    Chemical Caut Of A Wound     This order was created via procedure documentation       Roxie Thornton PA-C, [facility]      Portions of the record may have been created with voice recognition software  Occasional wrong word or "sound alike" substitutions may have occurred due to the inherent limitations of voice recognition software  Read the chart carefully and recognize, using context, where substitutions have occurred

## 2022-03-28 ENCOUNTER — OFFICE VISIT (OUTPATIENT)
Dept: WOUND CARE | Facility: CLINIC | Age: 62
End: 2022-03-28
Payer: COMMERCIAL

## 2022-03-28 VITALS
HEART RATE: 90 BPM | TEMPERATURE: 97.6 F | SYSTOLIC BLOOD PRESSURE: 160 MMHG | DIASTOLIC BLOOD PRESSURE: 88 MMHG | RESPIRATION RATE: 20 BRPM

## 2022-03-28 DIAGNOSIS — L97.228 NON-PRESSURE CHRONIC ULCER OF LEFT CALF WITH OTHER SPECIFIED SEVERITY (HCC): ICD-10-CM

## 2022-03-28 DIAGNOSIS — E66.01 MORBID OBESITY WITH BMI OF 50.0-59.9, ADULT (HCC): ICD-10-CM

## 2022-03-28 DIAGNOSIS — I87.332 CHRONIC VENOUS HYPERTENSION (IDIOPATHIC) WITH ULCER AND INFLAMMATION OF LEFT LOWER EXTREMITY (CODE) (HCC): Primary | ICD-10-CM

## 2022-03-28 PROCEDURE — 17250 CHEM CAUT OF GRANLTJ TISSUE: CPT | Performed by: ORTHOPAEDIC SURGERY

## 2022-03-28 RX ORDER — LIDOCAINE HYDROCHLORIDE 40 MG/ML
5 SOLUTION TOPICAL ONCE
Status: COMPLETED | OUTPATIENT
Start: 2022-03-28 | End: 2022-03-28

## 2022-03-28 RX ADMIN — LIDOCAINE HYDROCHLORIDE 5 ML: 40 SOLUTION TOPICAL at 09:20

## 2022-03-28 NOTE — PROGRESS NOTES
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 severity ([facility] )  L97 228    3  Morbid obesity with BMI of 50 0-59 9, adult ([facility])  E66 01     Z68 43         Assessment and Plan :  · Continue compression with Circaids on RLE   · LLE wound increased in size as pt was not wearing Surepress for compression   · Chemically Cauterized as below  · Continue wound management with Calmoseptine to periwound and Polymem to wound bed, see wound orders below  · Restart Surepress for compression  · No harsh cleansers such as alcohol, peroxide, or antibacterial soap, do not submerge in water  · Continue frequent elevation of leg and increase exercise/walking  · Continue to consume protein and encouraged to loose weight  · Followup in 1 week or call sooner with questions or concerns      Subjective:   Patient presents for followup of LLE venous ulcer  Pt notice wound increased in size since he's been taking a holiday from the Surepress compression and using Tubi   Pt has been applying Polymem to wound bed          The following portions of the patient's history were reviewed and updated as appropriate:   Patient Active Problem List   Diagnosis    Venous ulcer of left lower extremity with varicose veins (HCC)    Acute blood loss anemia    Bleeding from varicose vein    Essential hypertension    Chronic venous hypertension (idiopathic) with ulcer and inflammation of left lower extremity (CODE) ([facility] )    Morbid obesity with BMI of 50 0-59 9, adult ([facility] )    Obstructive sleep apnea treated with continuous positive airway pressure (CPAP)     Past Medical History:   Diagnosis Date    Anxiety     CPAP (continuous positive airway pressure) dependence     Hypertension     Sleep apnea     Venous stasis ulcers of both lower extremities (HCC)      Past Surgical History:   Procedure Laterality Date    HERNIA REPAIR      umbilical    [place] ENDOVENOUS LASER, 1ST VEIN Left 8/12/2021    Procedure: ENDOVASCULAR LASER THERAPY (EVLT), Left GSV;  Surgeon: Danial Macias MD;  Location: [facility];  Service: Vascular    TONSILLECTOMY       No family history on file  Social History     Socioeconomic History    Marital status: /Civil Union     Spouse name: Not on file    Number of children: Not on file    Years of education: Not on file    Highest education level: Not on file   Occupational History    Not on file   Tobacco Use    Smoking status: Never Smoker    Smokeless tobacco: Never Used   Vaping Use    Vaping Use: Never used   Substance and Sexual Activity    Alcohol use:  Yes     Alcohol/week: 3 0 standard drinks     Types: 2 Cans of beer, 1 Shots of liquor per week     Comment: daily    Drug use: Never    Sexual activity: Not on file   Other Topics Concern    Not on file   Social History Narrative    Not on file     Social Determinants of Health     Financial Resource Strain: Not on file   Food Insecurity: Not on file   Transportation Needs: Not on file   Physical Activity: Not on file   Stress: Not on file   Social Connections: Not on file   Intimate Partner Violence: Not on file   Housing Stability: Not on file       Current Outpatient Medications:     fexofenadine (ALLEGRA) 180 MG tablet, Take 1 tablet by mouth daily, Disp: , Rfl:     lisinopril-hydrochlorothiazide (PRINZIDE,ZESTORETIC) 20-12 5 MG per tablet, Take 2 tablets by mouth daily, Disp: 90 tablet, Rfl: 3    metoprolol succinate (TOPROL-XL) 100 mg 24 hr tablet, Take 1 tablet (100 mg total) by mouth daily, Disp: 90 tablet, Rfl: 5    naproxen sodium (ALEVE) 220 MG tablet, Take 1 tablet by mouth every 8 (eight) hours, Disp: , Rfl:     sertraline (ZOLOFT) 100 mg tablet, Take 1 tablet (100 mg total) by mouth daily, Disp: 90 tablet, Rfl: 5  No current facility-administered medications for this visit  Review of Systems   Constitutional: Negative for appetite change, chills, fatigue, fever and unexpected weight change  HENT: Negative for congestion, hearing loss and postnasal drip  Respiratory: Negative for cough and shortness of breath  Cardiovascular: Positive for leg swelling  Musculoskeletal: Positive for gait problem  Skin: Positive for wound (LLE )  Negative for rash  Neurological: Negative for numbness  Hematological: Does not bruise/bleed easily  Psychiatric/Behavioral: Negative  Objective:  /88   Pulse 90   Temp 97 6 °F (36 4 °C)   Resp 20         Physical Exam  Vitals reviewed  Constitutional:       General: He is not in acute distress  Appearance: Normal appearance  He is well-developed  He is morbidly obese  HENT:      Head: Normocephalic and atraumatic  Cardiovascular:      Rate and Rhythm: Normal rate  Pulmonary:      Effort: Pulmonary effort is normal    Musculoskeletal:         General: No deformity  Right lower leg: Edema present  Left lower leg: Edema present  Skin:     General: Skin is warm and dry  Findings: Wound (LLE) present  Comments: Increased beefy red hypergranulated tissue with biofilm and slough, macerated wound edges  See wound assessment   Neurological:      General: No focal deficit present  Mental Status: He is alert and oriented to person, place, and time  Gait: Gait abnormal    Psychiatric:         Mood and Affect: Mood and affect normal          Behavior: Behavior normal  Behavior is cooperative                    Chemical Caut Of A Wound     Date/Time 3/28/2022 1:31 PM     Performed by  Goran Vickers PA-C     Authorized by Goran Vickers PA-C       Associated Wounds:   Wound 06/11/21 Venous Ulcer Pretibial Left     Universal Protocol   Consent: Verbal consent obtained  Written consent obtained  Risks and benefits: risks, benefits and alternatives were discussed  Consent given by: patient  Time out: Immediately prior to procedure a "time out" was called to verify the correct patient, procedure, equipment, support staff and site/side marked as required  Patient understanding: patient states understanding of the procedure being performed  Patient identity confirmed: verbally with patient        Local anesthesia used: yes     Anesthesia   Local anesthesia used: yes  Local Anesthetic: topical anesthetic     Sedation   Patient sedated: no        Specimen: no    Culture: no   Procedure Details   Procedure Notes: After permission and placement of topical local anesthetic, 2 Silver nitrate stick(s) were used to cauterize the hypergranular tissue of the open wound  Normal saline was used to neutralize the reaction  A dressing was applied, see orders  Patient tolerated procedure well  Patient tolerance: Patient tolerated the procedure well with no immediate complications                  Wound Instructions:  Orders Placed This Encounter   Procedures    Wound cleansing and dressings     Left lower leg:     Wash your hands with soap and water  Remove old dressing, discard into plastic bag and place in trash  Cleanse the wound with the prophase  prior to applying a clean dressing  Do not use tissue or cotton balls  Do not scrub the wound  Pat dry using gauze      Ok to shower on dressing change days      Apply a barrier cream around the wound, only apply to intact skin  Calmoseptine applied today at wound center or anything with zinc will protect the intact skin    Apply Polymem (cut slits into polymem) to the wound bed, cover with ABD , rolled gauze and secure tape  Apply surepress for compression  Change dressing every 2 days (wife to do dressing changes)    Wound cauterized today        This was done today at the wound care center  See back in 1 week                                           Standing Status:   Future     Standing Expiration Date:   3/28/2023    Wound compression and edema control     Wound compression and edema control: Surepress     Apply compression wrap to your affected Leg(s) from mid-foot to knee making sure to cover the heel  Apply in the morning and re-wrap as needed during the day if wrap becomes loose  Remove at bedtime and elevate legs or lie down      Avoid prolonged standing in one place      Elevate leg(s) above the level of the heart when sitting or as much as possible  Standing Status:   Future     Standing Expiration Date:   3/28/2023    Chemical Caut Of A Wound     This order was created via procedure documentation       Elie Corbin PA-C, [facility]      Portions of the record may have been created with voice recognition software  Occasional wrong word or "sound alike" substitutions may have occurred due to the inherent limitations of voice recognition software  Read the chart carefully and recognize, using context, where substitutions have occurred

## 2022-03-28 NOTE — PATIENT INSTRUCTIONS
Orders Placed This Encounter   Procedures    Wound cleansing and dressings     Left lower leg:     Wash your hands with soap and water  Remove old dressing, discard into plastic bag and place in trash  Cleanse the wound with the prophase  prior to applying a clean dressing  Do not use tissue or cotton balls  Do not scrub the wound  Pat dry using gauze      Ok to shower on dressing change days      Apply a barrier cream around the wound, only apply to intact skin  Calmoseptine applied today at wound center or anything with zinc will protect the intact skin  Apply Polymem (cut slits into polymem) to the wound bed, cover with ABD , rolled gauze and secure tape  Apply surepress for compression  Change dressing every 2 days (wife to do dressing changes)    Wound cauterized today        This was done today at the wound care center  See back in 1 week                                           Standing Status:   Future     Standing Expiration Date:   3/28/2023    Wound compression and edema control     Wound compression and edema control: Surepress     Apply compression wrap to your affected Leg(s) from mid-foot to knee making sure to cover the heel  Apply in the morning and re-wrap as needed during the day if wrap becomes loose  Remove at bedtime and elevate legs or lie down      Avoid prolonged standing in one place      Elevate leg(s) above the level of the heart when sitting or as much as possible       Standing Status:   Future     Standing Expiration Date:   3/28/2023

## 2022-04-05 ENCOUNTER — OFFICE VISIT (OUTPATIENT)
Dept: WOUND CARE | Facility: CLINIC | Age: 62
End: 2022-04-05
Payer: COMMERCIAL

## 2022-04-05 VITALS — RESPIRATION RATE: 20 BRPM | DIASTOLIC BLOOD PRESSURE: 84 MMHG | SYSTOLIC BLOOD PRESSURE: 142 MMHG | HEART RATE: 71 BPM

## 2022-04-05 DIAGNOSIS — I87.332 CHRONIC VENOUS HYPERTENSION (IDIOPATHIC) WITH ULCER AND INFLAMMATION OF LEFT LOWER EXTREMITY (CODE) (HCC): Primary | ICD-10-CM

## 2022-04-05 DIAGNOSIS — L97.228 NON-PRESSURE CHRONIC ULCER OF LEFT CALF WITH OTHER SPECIFIED SEVERITY (HCC): ICD-10-CM

## 2022-04-05 DIAGNOSIS — E66.01 MORBID OBESITY WITH BMI OF 50.0-59.9, ADULT (HCC): ICD-10-CM

## 2022-04-05 PROCEDURE — 17250 CHEM CAUT OF GRANLTJ TISSUE: CPT | Performed by: ORTHOPAEDIC SURGERY

## 2022-04-05 RX ORDER — LIDOCAINE HYDROCHLORIDE 40 MG/ML
5 SOLUTION TOPICAL ONCE
Status: COMPLETED | OUTPATIENT
Start: 2022-04-05 | End: 2022-04-05

## 2022-04-05 RX ADMIN — LIDOCAINE HYDROCHLORIDE 5 ML: 40 SOLUTION TOPICAL at 09:42

## 2022-04-05 NOTE — PROGRESS NOTES
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 ([facility] )  L97 228    3  Morbid obesity with BMI of 50 0-59 9, adult ([facility] )  E66 01     Z68 43         Assessment and Plan :  · Continue compression with Circaids on RLE   · LLE wound increased in size  · Chemically Cauterized as below  · Continue wound management with Calmoseptine to periwound and change wound bed dressing to daily dressing changes with Maxorb Ag, see wound orders below  · Continue Surepress for compression  · No harsh cleansers such as alcohol, peroxide, or antibacterial soap, do not submerge in water  · Continue frequent elevation of leg and increase exercise/walking  · Continue to consume protein and encouraged to loose weight    · Followup in 1 week or call sooner with questions or concerns      Subjective:   Patient presents for followup of LLE venous ulcer  Pt with increased serous drainage  Pt has been applying Polymem to wound bed and Surepress for compression  Denies fever or chills          The following portions of the patient's history were reviewed and updated as appropriate:   Patient Active Problem List   Diagnosis    Venous ulcer of left lower extremity with varicose veins (HCC)    Acute blood loss anemia    Bleeding from varicose vein    Essential hypertension    Chronic venous hypertension (idiopathic) with ulcer and inflammation of left lower extremity (CODE) ([facility] )    Morbid obesity with BMI of 50 0-59 9, adult ([facility] )    Obstructive sleep apnea treated with continuous positive airway pressure (CPAP)     Past Medical History:   Diagnosis Date    Anxiety     CPAP (continuous positive airway pressure) dependence     Hypertension     Sleep apnea     Venous stasis ulcers of both lower extremities (HCC)      Past Surgical History:   Procedure Laterality Date    HERNIA REPAIR      umbilical    [place] ENDOVENOUS LASER, 1ST VEIN Left 8/12/2021    Procedure: ENDOVASCULAR LASER THERAPY (EVLT), Left GSV;  Surgeon: Jaky Paz MD;  Location: [facility];  Service: Vascular    TONSILLECTOMY       No family history on file  Social History     Socioeconomic History    Marital status: /Civil Union     Spouse name: None    Number of children: None    Years of education: None    Highest education level: None   Occupational History    None   Tobacco Use    Smoking status: Never Smoker    Smokeless tobacco: Never Used   Vaping Use    Vaping Use: Never used   Substance and Sexual Activity    Alcohol use:  Yes     Alcohol/week: 3 0 standard drinks     Types: 2 Cans of beer, 1 Shots of liquor per week     Comment: daily    Drug use: Never    Sexual activity: None   Other Topics Concern    None   Social History Narrative    None     Social Determinants of Health     Financial Resource Strain: Not on file   Food Insecurity: Not on file   Transportation Needs: Not on file   Physical Activity: Not on file   Stress: Not on file   Social Connections: Not on file   Intimate Partner Violence: Not on file   Housing Stability: Not on file       Current Outpatient Medications:     fexofenadine (ALLEGRA) 180 MG tablet, Take 1 tablet by mouth daily, Disp: , Rfl:     lisinopril-hydrochlorothiazide (PRINZIDE,ZESTORETIC) 20-12 5 MG per tablet, Take 2 tablets by mouth daily, Disp: 90 tablet, Rfl: 3    metoprolol succinate (TOPROL-XL) 100 mg 24 hr tablet, Take 1 tablet (100 mg total) by mouth daily, Disp: 90 tablet, Rfl: 5    naproxen sodium (ALEVE) 220 MG tablet, Take 1 tablet by mouth every 8 (eight) hours, Disp: , Rfl:     sertraline (ZOLOFT) 100 mg tablet, Take 1 tablet (100 mg total) by mouth daily, Disp: 90 tablet, Rfl: 5  No current facility-administered medications for this visit  Review of Systems   Constitutional: Negative for appetite change, chills, fatigue, fever and unexpected weight change  HENT: Negative for congestion, hearing loss and postnasal drip  Respiratory: Negative for cough and shortness of breath  Cardiovascular: Positive for leg swelling  Musculoskeletal: Positive for gait problem  Skin: Positive for wound (LLE )  Negative for rash  Neurological: Negative for numbness  Hematological: Does not bruise/bleed easily  Psychiatric/Behavioral: Negative  Objective:  /84   Pulse 71   Resp 20   Pain Score:   2     Physical Exam  Vitals reviewed  Constitutional:       General: He is not in acute distress  Appearance: Normal appearance  He is well-developed  He is morbidly obese  HENT:      Head: Normocephalic and atraumatic  Cardiovascular:      Rate and Rhythm: Normal rate  Pulmonary:      Effort: Pulmonary effort is normal    Musculoskeletal:         General: No deformity  Right lower leg: Edema present  Left lower leg: Edema present  Skin:     General: Skin is warm and dry  Findings: Wound (LLE) present  Comments: Increased beefy red hypergranulated tissue with biofilm and, macerated wound edges  See wound assessment   Neurological:      General: No focal deficit present  Mental Status: He is alert and oriented to person, place, and time  Gait: Gait abnormal    Psychiatric:         Mood and Affect: Mood and affect normal          Behavior: Behavior normal  Behavior is cooperative  Wound 06/11/21 Venous Ulcer Pretibial Left (Active)   Wound Description Beefy red;Epithelialization; Hypergranulation 04/05/22 0925   Hui-wound Assessment Fragile; Hyperpigmented 04/05/22 0925   Wound Length (cm) 9 cm 04/05/22 0925   Wound Width (cm) 11 cm 04/05/22 0925   Wound Depth (cm) 0 1 cm 04/05/22 0925   Wound Surface Area (cm^2) 99 cm^2 04/05/22 0925   Wound Volume (cm^3) 9 9 cm^3 04/05/22 0925   Calculated Wound Volume (cm^3) 9 9 cm^3 04/05/22 0925   Change in Wound Size % 91 01 04/05/22 0925   Drainage Amount Moderate 04/05/22 0925   Drainage Description Serosanguineous 04/05/22 0925   Non-staged Wound Description Full thickness 04/05/22 0925   Treatments Irrigation with NSS 08/20/21 0914   Dressing Status Intact 04/05/22 0925              Chemical Caut Of A Wound     Date/Time 4/5/2022 11:28 AM     Performed by  Syeda Browne PA-C     Authorized by Syeda Browne PA-C       Associated Wounds:   Wound 06/11/21 Venous Ulcer Pretibial Left     Universal Protocol   Consent: Verbal consent obtained  Written consent obtained  Risks and benefits: risks, benefits and alternatives were discussed  Consent given by: patient  Time out: Immediately prior to procedure a "time out" was called to verify the correct patient, procedure, equipment, support staff and site/side marked as required  Patient understanding: patient states understanding of the procedure being performed  Patient identity confirmed: verbally with patient        Local anesthesia used: yes     Anesthesia   Local anesthesia used: yes  Local Anesthetic: topical anesthetic     Sedation   Patient sedated: no        Specimen: no    Culture: no   Procedure Details   Procedure Notes: After permission and placement of topical local anesthetic, 3 Silver nitrate stick(s) were used to cauterize the hypergranular tissue of the open wound  Normal saline was used to neutralize the reaction  A dressing was applied, see orders  Patient tolerated procedure well        Patient tolerance: Patient tolerated the procedure well with no immediate complications                  Wound Instructions:  Orders Placed This Encounter   Procedures    Wound cleansing and dressings     Left lower leg:     Wash your hands with soap and water  Remove old dressing, discard into plastic bag and place in trash  Cleanse the wound with the prophase  prior to applying a clean dressing  Do not use tissue or cotton balls  Do not scrub the wound  Pat dry using gauze      Ok to shower on dressing change days      Apply a barrier cream around the wound, only apply to intact skin  Calmoseptine applied today at wound center or anything with zinc will protect the intact skin  Apply Masorb Ag to the wound bed, cover with ABD , rolled gauze and secure tape  Apply surepress for compression  Change dressing every day (wife to do dressing changes)     Wound cauterized today        This was done today at the wound care center  See back in 1 week                                               Standing Status:   Future     Standing Expiration Date:   4/5/2023    Wound compression and edema control     Surepress     Apply compression wrap to your affected Leg(s) from mid-foot to knee making sure to cover the heel  Apply in the morning and re-wrap as needed during the day if wrap becomes loose  Remove at bedtime and elevate legs or lie down      Avoid prolonged standing in one place      Elevate leg(s) above the level of the heart when sitting or as much as possible     Standing Status:   Future     Standing Expiration Date:   4/5/2023    Chemical Caut Of A Wound     This order was created via procedure documentation       Marian Obrien PA-C, [facility]      Portions of the record may have been created with voice recognition software  Occasional wrong word or "sound alike" substitutions may have occurred due to the inherent limitations of voice recognition software  Read the chart carefully and recognize, using context, where substitutions have occurred

## 2022-04-05 NOTE — PATIENT INSTRUCTIONS
Orders Placed This Encounter   Procedures    Wound cleansing and dressings     Left lower leg:     Wash your hands with soap and water  Remove old dressing, discard into plastic bag and place in trash  Cleanse the wound with the prophase  prior to applying a clean dressing  Do not use tissue or cotton balls  Do not scrub the wound  Pat dry using gauze      Ok to shower on dressing change days      Apply a barrier cream around the wound, only apply to intact skin  Calmoseptine applied today at wound center or anything with zinc will protect the intact skin  Apply Masorb Ag to the wound bed, cover with ABD , rolled gauze and secure tape  Apply surepress for compression  Change dressing every day (wife to do dressing changes)     Wound cauterized today        This was done today at the wound care center  See back in 1 week                                               Standing Status:   Future     Standing Expiration Date:   4/5/2023    Wound compression and edema control     Surepress     Apply compression wrap to your affected Leg(s) from mid-foot to knee making sure to cover the heel  Apply in the morning and re-wrap as needed during the day if wrap becomes loose   Remove at bedtime and elevate legs or lie down      Avoid prolonged standing in one place      Elevate leg(s) above the level of the heart when sitting or as much as possible     Standing Status:   Future     Standing Expiration Date:   4/5/2023

## 2022-04-11 ENCOUNTER — OFFICE VISIT (OUTPATIENT)
Dept: WOUND CARE | Facility: CLINIC | Age: 62
End: 2022-04-11
Payer: COMMERCIAL

## 2022-04-11 VITALS
SYSTOLIC BLOOD PRESSURE: 160 MMHG | TEMPERATURE: 97.1 F | HEART RATE: 87 BPM | DIASTOLIC BLOOD PRESSURE: 89 MMHG | RESPIRATION RATE: 18 BRPM

## 2022-04-11 DIAGNOSIS — E66.01 MORBID OBESITY WITH BMI OF 50.0-59.9, ADULT (HCC): ICD-10-CM

## 2022-04-11 DIAGNOSIS — L97.228 NON-PRESSURE CHRONIC ULCER OF LEFT CALF WITH OTHER SPECIFIED SEVERITY (HCC): ICD-10-CM

## 2022-04-11 DIAGNOSIS — I87.332 CHRONIC VENOUS HYPERTENSION (IDIOPATHIC) WITH ULCER AND INFLAMMATION OF LEFT LOWER EXTREMITY (CODE) (HCC): Primary | ICD-10-CM

## 2022-04-11 PROCEDURE — 11042 DBRDMT SUBQ TIS 1ST 20SQCM/<: CPT | Performed by: ORTHOPAEDIC SURGERY

## 2022-04-11 PROCEDURE — 11045 DBRDMT SUBQ TISS EACH ADDL: CPT | Performed by: ORTHOPAEDIC SURGERY

## 2022-04-11 RX ORDER — LIDOCAINE HYDROCHLORIDE 40 MG/ML
5 SOLUTION TOPICAL ONCE
Status: COMPLETED | OUTPATIENT
Start: 2022-04-11 | End: 2022-04-11

## 2022-04-11 RX ADMIN — LIDOCAINE HYDROCHLORIDE 5 ML: 40 SOLUTION TOPICAL at 08:44

## 2022-04-11 NOTE — PATIENT INSTRUCTIONS
Orders Placed This Encounter   Procedures    Wound cleansing and dressings     Left lower leg:     Wash your hands with soap and water  Remove old dressing, discard into plastic bag and place in trash  Cleanse the wound with the prophase  prior to applying a clean dressing  Do not use tissue or cotton balls  Do not scrub the wound  Pat dry using gauze      Ok to shower on dressing change days      Apply a barrier cream around the wound, only apply to intact skin  Calmoseptine applied today at wound center or anything with zinc will protect the intact skin  Apply opticell ag to the wound bed, cover with ABD , rolled gauze and secure tape  Apply surepress for compression  Change dressing every day (wife to do dressing changes)                 Apply compression to your affected Leg(s) from mid-foot to knee making sure to cover the heel  Apply in the morning and re-wrap as needed during the day if wrap becomes loose  Remove at bedtime and elevate legs or lie down      Avoid prolonged standing in one place      Elevate leg(s) above the level of the heart when sitting or as much as possible    Wound was debrided today    This was done today at the wound care center      See back in 1 week     Standing Status:   Future     Standing Expiration Date:   4/11/2023

## 2022-04-11 NOTE — PROGRESS NOTES
Patient ID: Torey Mcmullen is a 62 y o  male Date of Birth 1960       Chief Complaint   Patient presents with    Follow Up Wound Care Visit     LLE wound       Allergies:  Hydromorphone    Diagnosis:   Diagnosis ICD-10-CM Associated Orders   1  Chronic venous hypertension (idiopathic) with ulcer and inflammation of left lower extremity (CODE) ([facility])  I87 332 lidocaine (XYLOCAINE) 4 % topical solution 5 mL     Wound cleansing and dressings   2  Non-pressure chronic ulcer of left calf with other specified severity ([place] [address] )  L97 228    3  Morbid obesity with BMI of 50 0-59 9, adult ([place] [address] )  E66 01     Z68 43         Assessment and Plan :  · LLE wound slowly healing  · Debrided as below  · Continue wound management with Calmoseptine to periwound and change wound bed dressing to daily dressing changes with Opticel Ag, see wound orders below  · Continue Surepress for compression  · No harsh cleansers such as alcohol, peroxide, or antibacterial soap, do not submerge in water  · Continue frequent elevation of leg and increase exercise/walking  · Continue to consume protein and encouraged to loose weight    · Followup in 1 week or call sooner with questions or concerns      Subjective:   Patient presents for followup of LLE venous ulcer  Pt states drainage decreased  Pt has been applying Opticel Agto wound bed and Surepress for compression  Denies fever or chills      The following portions of the patient's history were reviewed and updated as appropriate:   Patient Active Problem List   Diagnosis    Venous ulcer of left lower extremity with varicose veins (HCC)    Acute blood loss anemia    Bleeding from varicose vein    Essential hypertension    Chronic venous hypertension (idiopathic) with ulcer and inflammation of left lower extremity (CODE) ([place] [address] )    Morbid obesity with BMI of 50 0-59 9, adult ([place] [address] )    Obstructive sleep apnea treated with continuous positive airway pressure (CPAP)     Past Medical History: Diagnosis Date    Anxiety     CPAP (continuous positive airway pressure) dependence     Hypertension     Sleep apnea     Venous stasis ulcers of both lower extremities (HCC)      Past Surgical History:   Procedure Laterality Date    HERNIA REPAIR      umbilical    [place] ENDOVENOUS LASER, 1ST VEIN Left 8/12/2021    Procedure: ENDOVASCULAR LASER THERAPY (EVLT), Left GSV;  Surgeon: Marylin Ackerman MD;  Location: [facility];  Service: Vascular    TONSILLECTOMY       No family history on file  Social History     Socioeconomic History    Marital status: /Civil Union     Spouse name: Not on file    Number of children: Not on file    Years of education: Not on file    Highest education level: Not on file   Occupational History    Not on file   Tobacco Use    Smoking status: Never Smoker    Smokeless tobacco: Never Used   Vaping Use    Vaping Use: Never used   Substance and Sexual Activity    Alcohol use:  Yes     Alcohol/week: 3 0 standard drinks     Types: 2 Cans of beer, 1 Shots of liquor per week     Comment: daily    Drug use: Never    Sexual activity: Not on file   Other Topics Concern    Not on file   Social History Narrative    Not on file     Social Determinants of Health     Financial Resource Strain: Not on file   Food Insecurity: Not on file   Transportation Needs: Not on file   Physical Activity: Not on file   Stress: Not on file   Social Connections: Not on file   Intimate Partner Violence: Not on file   Housing Stability: Not on file       Current Outpatient Medications:     fexofenadine (ALLEGRA) 180 MG tablet, Take 1 tablet by mouth daily, Disp: , Rfl:     lisinopril-hydrochlorothiazide (PRINZIDE,ZESTORETIC) 20-12 5 MG per tablet, Take 2 tablets by mouth daily, Disp: 90 tablet, Rfl: 3    metoprolol succinate (TOPROL-XL) 100 mg 24 hr tablet, Take 1 tablet (100 mg total) by mouth daily, Disp: 90 tablet, Rfl: 5    naproxen sodium (ALEVE) 220 MG tablet, Take 1 tablet by mouth every 8 (eight) hours, Disp: , Rfl:     sertraline (ZOLOFT) 100 mg tablet, Take 1 tablet (100 mg total) by mouth daily, Disp: 90 tablet, Rfl: 5  No current facility-administered medications for this visit  Review of Systems   Constitutional: Negative for appetite change, chills, fatigue, fever and unexpected weight change  HENT: Negative for congestion, hearing loss and postnasal drip  Respiratory: Negative for cough and shortness of breath  Cardiovascular: Positive for leg swelling  Musculoskeletal: Positive for gait problem  Skin: Positive for wound (LLE )  Negative for rash  Neurological: Negative for numbness  Hematological: Does not bruise/bleed easily  Psychiatric/Behavioral: Negative  Objective:  /89   Pulse 87   Temp (!) 97 1 °F (36 2 °C)   Resp 18   Pain Score:   3     Physical Exam  Vitals reviewed  Constitutional:       General: He is not in acute distress  Appearance: Normal appearance  He is well-developed  He is morbidly obese  HENT:      Head: Normocephalic and atraumatic  Cardiovascular:      Rate and Rhythm: Normal rate  Pulmonary:      Effort: Pulmonary effort is normal    Musculoskeletal:         General: No deformity  Right lower leg: Edema present  Left lower leg: Edema present  Skin:     General: Skin is warm and dry  Findings: Wound (LLE) present  Comments: + biofilm and slough  See wound assessment   Neurological:      General: No focal deficit present  Mental Status: He is alert and oriented to person, place, and time  Gait: Gait abnormal    Psychiatric:         Mood and Affect: Mood and affect normal          Behavior: Behavior normal  Behavior is cooperative  Wound 06/11/21 Venous Ulcer Pretibial Left (Active)   Wound Image Images linked 04/11/22 0839   Wound Description Beefy red;Epithelialization; Hypergranulation;Yellow 04/11/22 0839   Hui-wound Assessment Fragile; Hyperpigmented 04/11/22 0872 Wound Length (cm) 8 1 cm 04/11/22 0839   Wound Width (cm) 11 2 cm 04/11/22 0839   Wound Depth (cm) 0 1 cm 04/11/22 0839   Wound Surface Area (cm^2) 90 72 cm^2 04/11/22 0839   Wound Volume (cm^3) 9 072 cm^3 04/11/22 0839   Calculated Wound Volume (cm^3) 9 07 cm^3 04/11/22 0839   Change in Wound Size % 91 77 04/11/22 0839   Drainage Amount Moderate 04/11/22 0839   Drainage Description Serosanguineous 04/11/22 0839   Non-staged Wound Description Full thickness 04/11/22 0839   Dressing Status Intact 04/11/22 0839       Debridement   Wound 06/11/21 Venous Ulcer Pretibial Left    Universal Protocol:  Consent: Verbal consent obtained  Written consent obtained  Risks and benefits: risks, benefits and alternatives were discussed  Consent given by: patient  Time out: Immediately prior to procedure a "time out" was called to verify the correct patient, procedure, equipment, support staff and site/side marked as required  Patient understanding: patient states understanding of the procedure being performed  Patient identity confirmed: verbally with patient      Performed by: PA  Debridement type: surgical  Level of debridement: subcutaneous tissue  Pain control: lidocaine 4%  Post-debridement measurements  Length (cm): 8 1  Width (cm): 11 2  Depth (cm): 0 2  Percent debrided: 100%  Surface Area (cm^2): 90 72  Area debrided (cm^2): 90 72  Volume (cm^3): 18 14  Tissue and other material debrided: subcutaneous tissue  Devitalized tissue debrided: biofilm and slough  Instrument(s) utilized: curette  Bleeding: small  Hemostasis obtained with: pressure  Procedural pain (0-10): 0  Post-procedural pain: 0   Response to treatment: procedure was tolerated well                         Wound Instructions:  Orders Placed This Encounter   Procedures    Wound cleansing and dressings     Left lower leg:     Wash your hands with soap and water  Remove old dressing, discard into plastic bag and place in trash    Cleanse the wound with the prophase  prior to applying a clean dressing  Do not use tissue or cotton balls  Do not scrub the wound  Pat dry using gauze      Ok to shower on dressing change days      Apply a barrier cream around the wound, only apply to intact skin  Calmoseptine applied today at wound center or anything with zinc will protect the intact skin  Apply opticell ag to the wound bed, cover with ABD , rolled gauze and secure tape  Apply surepress for compression  Change dressing every day (wife to do dressing changes)                 Apply compression to your affected Leg(s) from mid-foot to knee making sure to cover the heel  Apply in the morning and re-wrap as needed during the day if wrap becomes loose  Remove at bedtime and elevate legs or lie down      Avoid prolonged standing in one place      Elevate leg(s) above the level of the heart when sitting or as much as possible    Wound was debrided today    This was done today at the wound care center  See back in 1 week     Standing Status:   Future     Standing Expiration Date:   4/11/2023       Francisco Javier Pizarro PA-C, [facility]      Portions of the record may have been created with voice recognition software  Occasional wrong word or "sound alike" substitutions may have occurred due to the inherent limitations of voice recognition software  Read the chart carefully and recognize, using context, where substitutions have occurred

## 2022-04-19 ENCOUNTER — OFFICE VISIT (OUTPATIENT)
Dept: WOUND CARE | Facility: CLINIC | Age: 62
End: 2022-04-19
Payer: COMMERCIAL

## 2022-04-19 VITALS
TEMPERATURE: 97.9 F | RESPIRATION RATE: 18 BRPM | SYSTOLIC BLOOD PRESSURE: 139 MMHG | DIASTOLIC BLOOD PRESSURE: 73 MMHG | HEART RATE: 78 BPM

## 2022-04-19 DIAGNOSIS — E66.01 MORBID OBESITY WITH BMI OF 50.0-59.9, ADULT (HCC): ICD-10-CM

## 2022-04-19 DIAGNOSIS — L97.228 NON-PRESSURE CHRONIC ULCER OF LEFT CALF WITH OTHER SPECIFIED SEVERITY (HCC): ICD-10-CM

## 2022-04-19 DIAGNOSIS — I87.332 CHRONIC VENOUS HYPERTENSION (IDIOPATHIC) WITH ULCER AND INFLAMMATION OF LEFT LOWER EXTREMITY (CODE) (HCC): Primary | ICD-10-CM

## 2022-04-19 PROCEDURE — 99214 OFFICE O/P EST MOD 30 MIN: CPT | Performed by: ORTHOPAEDIC SURGERY

## 2022-04-19 PROCEDURE — 11045 DBRDMT SUBQ TISS EACH ADDL: CPT | Performed by: ORTHOPAEDIC SURGERY

## 2022-04-19 PROCEDURE — 11042 DBRDMT SUBQ TIS 1ST 20SQCM/<: CPT | Performed by: ORTHOPAEDIC SURGERY

## 2022-04-19 RX ORDER — LIDOCAINE HYDROCHLORIDE 40 MG/ML
5 SOLUTION TOPICAL ONCE
Status: COMPLETED | OUTPATIENT
Start: 2022-04-19 | End: 2022-04-19

## 2022-04-19 RX ORDER — OXYCODONE HYDROCHLORIDE 10 MG/1
5 TABLET ORAL EVERY 6 HOURS PRN
Qty: 30 TABLET | Refills: 0 | Status: SHIPPED | OUTPATIENT
Start: 2022-04-19 | End: 2022-05-09

## 2022-04-19 RX ADMIN — LIDOCAINE HYDROCHLORIDE 5 ML: 40 SOLUTION TOPICAL at 08:28

## 2022-04-19 NOTE — PROGRESS NOTES
Patient ID: Torey Mcmullen is a 62 y o  male Date of Birth 1960       Chief Complaint   Patient presents with    Follow Up Wound Care Visit     left lower extemity ulcer       Allergies:  Hydromorphone    Diagnosis:   Diagnosis ICD-10-CM Associated Orders   1  Chronic venous hypertension (idiopathic) with ulcer and inflammation of left lower extremity (CODE) ([facility])  I87 332 lidocaine (XYLOCAINE) 4 % topical solution 5 mL     Wound cleansing and dressings     oxyCODONE (ROXICODONE) 10 MG TABS     Debridement   2  Non-pressure chronic ulcer of left calf with other specified severity ([place] [address] )  L97 228    3  Morbid obesity with BMI of 50 0-59 9, adult ([place] [address] )  E66 01     Z68 43         Assessment and Plan :  · LLE wound slowly healing  · Debrided as below  · Continue Prophase soaks and change wound management to Drawtex/Qwick to wound bed with Calmoseptine to periwound, see wound orders below  · Continue Surepress for compression  · No harsh cleansers such as alcohol, peroxide, or antibacterial soap, do not submerge in water  · Continue frequent elevation of leg and increase exercise/walking  · Pain control with Roxicodone  · Continue to consume protein and encouraged to loose weight    · Followup in 1 week or call sooner with questions or concerns      Subjective:   Patient presents for followup of LLE venous ulcer  Pt c/o increased drainage and pain  Pt has been applying Opticel Agto wound bed and Surepress for compression  Denies fever or chills        The following portions of the patient's history were reviewed and updated as appropriate:   Patient Active Problem List   Diagnosis    Venous ulcer of left lower extremity with varicose veins (HCC)    Acute blood loss anemia    Bleeding from varicose vein    Essential hypertension    Chronic venous hypertension (idiopathic) with ulcer and inflammation of left lower extremity (CODE) ([facility] )    Morbid obesity with BMI of 50 0-59 9, adult ([facility] )    Obstructive sleep apnea treated with continuous positive airway pressure (CPAP)     Past Medical History:   Diagnosis Date    Anxiety     CPAP (continuous positive airway pressure) dependence     Hypertension     Sleep apnea     Venous stasis ulcers of both lower extremities (HCC)      Past Surgical History:   Procedure Laterality Date    HERNIA REPAIR      umbilical    [place] ENDOVENOUS LASER, 1ST VEIN Left 8/12/2021    Procedure: ENDOVASCULAR LASER THERAPY (EVLT), Left GSV;  Surgeon: Cristy Devries MD;  Location: [facility];  Service: Vascular    TONSILLECTOMY       No family history on file  Social History     Socioeconomic History    Marital status: /Civil Union     Spouse name: None    Number of children: None    Years of education: None    Highest education level: None   Occupational History    None   Tobacco Use    Smoking status: Never Smoker    Smokeless tobacco: Never Used   Vaping Use    Vaping Use: Never used   Substance and Sexual Activity    Alcohol use:  Yes     Alcohol/week: 3 0 standard drinks     Types: 2 Cans of beer, 1 Shots of liquor per week     Comment: daily    Drug use: Never    Sexual activity: None   Other Topics Concern    None   Social History Narrative    None     Social Determinants of Health     Financial Resource Strain: Not on file   Food Insecurity: Not on file   Transportation Needs: Not on file   Physical Activity: Not on file   Stress: Not on file   Social Connections: Not on file   Intimate Partner Violence: Not on file   Housing Stability: Not on file       Current Outpatient Medications:     fexofenadine (ALLEGRA) 180 MG tablet, Take 1 tablet by mouth daily, Disp: , Rfl:     lisinopril-hydrochlorothiazide (PRINZIDE,ZESTORETIC) 20-12 5 MG per tablet, Take 2 tablets by mouth daily, Disp: 90 tablet, Rfl: 3    metoprolol succinate (TOPROL-XL) 100 mg 24 hr tablet, Take 1 tablet (100 mg total) by mouth daily, Disp: 90 tablet, Rfl: 5    naproxen sodium (ALEVE) 220 MG tablet, Take 1 tablet by mouth every 8 (eight) hours, Disp: , Rfl:     oxyCODONE (ROXICODONE) 10 MG TABS, Take 0 5 tablets (5 mg total) by mouth every 6 (six) hours as needed for moderate pain or severe pain for up to 20 days Max Daily Amount: 20 mg, Disp: 30 tablet, Rfl: 0    sertraline (ZOLOFT) 100 mg tablet, Take 1 tablet (100 mg total) by mouth daily, Disp: 90 tablet, Rfl: 5  No current facility-administered medications for this visit  Review of Systems   Constitutional: Negative for appetite change, chills, fatigue, fever and unexpected weight change  HENT: Negative for congestion, hearing loss and postnasal drip  Respiratory: Negative for cough and shortness of breath  Cardiovascular: Positive for leg swelling  Musculoskeletal: Positive for gait problem  Skin: Positive for wound (LLE )  Negative for rash  Neurological: Negative for numbness  Hematological: Does not bruise/bleed easily  Psychiatric/Behavioral: Negative  Objective:  /73   Pulse 78   Temp 97 9 °F (36 6 °C)   Resp 18   Pain Score:   5     Physical Exam  Vitals reviewed  Constitutional:       General: He is not in acute distress  Appearance: Normal appearance  He is well-developed  He is morbidly obese  HENT:      Head: Normocephalic and atraumatic  Cardiovascular:      Rate and Rhythm: Normal rate  Pulmonary:      Effort: Pulmonary effort is normal    Musculoskeletal:         General: No deformity  Right lower leg: Edema present  Left lower leg: Edema present  Skin:     General: Skin is warm and dry  Findings: Wound (LLE) present  Comments: + adherent yellow slough with macerated wound edges  See wound assessment   Neurological:      General: No focal deficit present  Mental Status: He is alert and oriented to person, place, and time        Gait: Gait abnormal    Psychiatric:         Mood and Affect: Mood and affect normal          Behavior: Behavior normal  Behavior is cooperative  Wound 06/11/21 Venous Ulcer Pretibial Left (Active)   Wound Description Beefy red;Epithelialization;Yellow 04/19/22 0824   Hui-wound Assessment Fragile; Hyperpigmented 04/19/22 0824   Wound Length (cm) 8 1 cm 04/19/22 0824   Wound Width (cm) 12 5 cm 04/19/22 0824   Wound Depth (cm) 0 1 cm 04/19/22 0824   Wound Surface Area (cm^2) 101 25 cm^2 04/19/22 0824   Wound Volume (cm^3) 10 125 cm^3 04/19/22 0824   Calculated Wound Volume (cm^3) 10 13 cm^3 04/19/22 0824   Change in Wound Size % 90 8 04/19/22 0824   Drainage Amount Large 04/19/22 0824   Drainage Description Serosanguineous 04/19/22 0824   Non-staged Wound Description Full thickness 04/19/22 0824   Treatments Irrigation with NSS 08/20/21 0914   Dressing Status Intact 04/19/22 0824     Debridement   Wound 06/11/21 Venous Ulcer Pretibial Left    Universal Protocol:  Consent: Verbal consent obtained  Written consent obtained  Risks and benefits: risks, benefits and alternatives were discussed  Consent given by: patient  Time out: Immediately prior to procedure a "time out" was called to verify the correct patient, procedure, equipment, support staff and site/side marked as required    Patient understanding: patient states understanding of the procedure being performed  Patient identity confirmed: verbally with patient      Performed by: PA  Debridement type: surgical  Level of debridement: subcutaneous tissue  Pain control: lidocaine 4%  Post-debridement measurements  Length (cm): 8 1  Width (cm): 12 5  Depth (cm): 0 2  Percent debrided: 100%  Surface Area (cm^2): 101 25  Area debrided (cm^2): 101 25  Volume (cm^3): 20 25  Tissue and other material debrided: subcutaneous tissue  Devitalized tissue debrided: fibrin  Instrument(s) utilized: curette  Bleeding: small  Hemostasis obtained with: pressure  Procedural pain (0-10): 0  Post-procedural pain: 0   Response to treatment: procedure was tolerated well Wound Instructions:  Orders Placed This Encounter   Procedures    Wound cleansing and dressings     Wound cleansing and dressings         Left lower leg:     Wash your hands with soap and water  Remove old dressing, discard into plastic bag and place in trash  Cleanse the wound with a 10 minute prophase  soak prior to applying a clean dressing  Do not use tissue or cotton balls  Do not scrub the wound  Pat dry using gauze      Ok to shower on dressing change days          Apply a barrier cream around the wound, only apply to intact skin  Calmoseptine applied today at wound center or anything with zinc will protect the intact skin  Apply opticell ag to the wound bed, cover with drawtex , rolled gauze and secure tape  Apply surepress for compression  Change dressing every day (wife to do dressing changes)                 Apply compression to your affected Leg(s) from mid-foot to knee making sure to cover the heel  Apply in the morning and re-wrap as needed during the day if wrap becomes loose  Remove at bedtime and elevate legs or lie down      Avoid prolonged standing in one place      Elevate leg(s) above the level of the heart when sitting or as much as possible       This was today at wound center     Standing Status:   Future     Standing Expiration Date:   4/19/2023    Debridement     This order was created via procedure documentation       Daniel Jain PA-C, [facility]      Portions of the record may have been created with voice recognition software  Occasional wrong word or "sound alike" substitutions may have occurred due to the inherent limitations of voice recognition software  Read the chart carefully and recognize, using context, where substitutions have occurred

## 2022-04-19 NOTE — PATIENT INSTRUCTIONS
Orders Placed This Encounter   Procedures    Wound cleansing and dressings     Wound cleansing and dressings         Left lower leg:     Wash your hands with soap and water  Remove old dressing, discard into plastic bag and place in trash  Cleanse the wound with a 10 minute prophase  soak prior to applying a clean dressing  Do not use tissue or cotton balls  Do not scrub the wound  Pat dry using gauze      Ok to shower on dressing change days          Apply a barrier cream around the wound, only apply to intact skin  Calmoseptine applied today at [facility] or anything with zinc will protect the intact skin  Apply opticell ag to the wound bed, cover with drawtex , rolled gauze and secure tape  Apply surepress for compression  Change dressing every day (wife to do dressing changes)                 Apply compression to your affected Leg(s) from mid-foot to knee making sure to cover the heel  Apply in the morning and re-wrap as needed during the day if wrap becomes loose   Remove at bedtime and elevate legs or lie down      Avoid prolonged standing in one place      Elevate leg(s) above the level of the heart when sitting or as much as possible       This was today at [facility]     Standing Status:   Future     Standing Expiration Date:   4/19/2023

## 2022-04-26 ENCOUNTER — OFFICE VISIT (OUTPATIENT)
Dept: WOUND CARE | Facility: CLINIC | Age: 62
End: 2022-04-26
Payer: COMMERCIAL

## 2022-04-26 VITALS
RESPIRATION RATE: 18 BRPM | TEMPERATURE: 96.2 F | DIASTOLIC BLOOD PRESSURE: 69 MMHG | HEART RATE: 77 BPM | SYSTOLIC BLOOD PRESSURE: 131 MMHG

## 2022-04-26 DIAGNOSIS — E66.01 MORBID OBESITY WITH BMI OF 50.0-59.9, ADULT (HCC): ICD-10-CM

## 2022-04-26 DIAGNOSIS — I87.332 CHRONIC VENOUS HYPERTENSION (IDIOPATHIC) WITH ULCER AND INFLAMMATION OF LEFT LOWER EXTREMITY (CODE) (HCC): Primary | ICD-10-CM

## 2022-04-26 DIAGNOSIS — L97.228 NON-PRESSURE CHRONIC ULCER OF LEFT CALF WITH OTHER SPECIFIED SEVERITY (HCC): ICD-10-CM

## 2022-04-26 PROCEDURE — 97597 DBRDMT OPN WND 1ST 20 CM/<: CPT | Performed by: ORTHOPAEDIC SURGERY

## 2022-04-26 PROCEDURE — 97598 DBRDMT OPN WND ADDL 20CM/<: CPT | Performed by: ORTHOPAEDIC SURGERY

## 2022-04-26 RX ORDER — LIDOCAINE HYDROCHLORIDE 40 MG/ML
5 SOLUTION TOPICAL ONCE
Status: COMPLETED | OUTPATIENT
Start: 2022-04-26 | End: 2022-04-26

## 2022-04-26 RX ADMIN — LIDOCAINE HYDROCHLORIDE 5 ML: 40 SOLUTION TOPICAL at 09:25

## 2022-04-26 NOTE — PATIENT INSTRUCTIONS
Orders Placed This Encounter   Procedures    Wound cleansing and dressings     Left lower leg:     Wash your hands with soap and water  Remove old dressing, discard into plastic bag and place in trash  Cleanse the wound with a 10 minute prophase  soak prior to applying a clean dressing  Do not use tissue or cotton balls  Do not scrub the wound  Pat dry using gauze      Ok to shower on dressing change days            Apply a barrier cream around the wound, only apply to intact skin  Calmoseptine applied today at [facility] or anything with zinc will protect the intact skin  Apply Polymem to the wound bed, cover with Optilock , rolled gauze and secure tape  Apply surepress for compression  Change dressing every day (wife to do dressing changes)                 Apply compression to your affected Leg(s) from mid-foot to knee making sure to cover the heel  Apply in the morning and re-wrap as needed during the day if wrap becomes loose   Remove at bedtime and elevate legs or lie down      Avoid prolonged standing in one place      Elevate leg(s) above the level of the heart when sitting or as much as possible        This was today at [facility]     Standing Status:   Future     Standing Expiration Date:   4/26/2023

## 2022-04-26 NOTE — PROGRESS NOTES
Patient ID: Yamila Hector is a 62 y o  male Date of Birth 1960       Chief Complaint   Patient presents with    Follow Up Wound Care Visit     left leg wound       Allergies:  Hydromorphone    Diagnosis:   Diagnosis ICD-10-CM Associated Orders   1  Chronic venous hypertension (idiopathic) with ulcer and inflammation of left lower extremity (CODE) ([facility])  I87 332 lidocaine (XYLOCAINE) 4 % topical solution 5 mL     Wound cleansing and dressings   2  Non-pressure chronic ulcer of left calf with other specified severity ([place] [address] )  L97 228    3  Morbid obesity with BMI of 50 0-59 9, adult ([place] [address] )  E66 01     Z68 43       Assessment and Plan :  · LLE wound slowly healing  · Debrided as below  · Continue Prophase soaks and resume with Polymem to wound bed and Calmoseptine to periwound, see wound orders below  · Continue Surepress for compression  · No harsh cleansers such as alcohol, peroxide, or antibacterial soap, do not submerge in water  · Continue frequent elevation of leg and increase exercise/walking  · Continue to consume protein and encouraged to loose weight    · Followup in 1 week or call sooner with questions or concerns      Subjective:   Patient presents for followup of LLE venous ulcer  Pt c/o increased drainage and pain  Pt has been applying Opticel Ag to wound bed covered with Drawtex for drainage and Surepress for compression  Denies fever or chills          The following portions of the patient's history were reviewed and updated as appropriate:   Patient Active Problem List   Diagnosis    Venous ulcer of left lower extremity with varicose veins (HCC)    Acute blood loss anemia    Bleeding from varicose vein    Essential hypertension    Chronic venous hypertension (idiopathic) with ulcer and inflammation of left lower extremity (CODE) ([place] [address] )    Morbid obesity with BMI of 50 0-59 9, adult ([place] [address] )    Obstructive sleep apnea treated with continuous positive airway pressure (CPAP)     Past Medical History:   Diagnosis Date    Anxiety     CPAP (continuous positive airway pressure) dependence     Hypertension     Sleep apnea     Venous stasis ulcers of both lower extremities (HCC)      Past Surgical History:   Procedure Laterality Date    HERNIA REPAIR      umbilical    [place] ENDOVENOUS LASER, 1ST VEIN Left 8/12/2021    Procedure: ENDOVASCULAR LASER THERAPY (EVLT), Left GSV;  Surgeon: Marylin Ackerman MD;  Location: [facility] OR;  Service: Vascular    TONSILLECTOMY       No family history on file  Social History     Socioeconomic History    Marital status: /Civil Union     Spouse name: None    Number of children: None    Years of education: None    Highest education level: None   Occupational History    None   Tobacco Use    Smoking status: Never Smoker    Smokeless tobacco: Never Used   Vaping Use    Vaping Use: Never used   Substance and Sexual Activity    Alcohol use:  Yes     Alcohol/week: 3 0 standard drinks     Types: 2 Cans of beer, 1 Shots of liquor per week     Comment: daily    Drug use: Never    Sexual activity: None   Other Topics Concern    None   Social History Narrative    None     Social Determinants of Health     Financial Resource Strain: Not on file   Food Insecurity: Not on file   Transportation Needs: Not on file   Physical Activity: Not on file   Stress: Not on file   Social Connections: Not on file   Intimate Partner Violence: Not on file   Housing Stability: Not on file       Current Outpatient Medications:     fexofenadine (ALLEGRA) 180 MG tablet, Take 1 tablet by mouth daily, Disp: , Rfl:     lisinopril-hydrochlorothiazide (PRINZIDE,ZESTORETIC) 20-12 5 MG per tablet, Take 2 tablets by mouth daily, Disp: 90 tablet, Rfl: 3    metoprolol succinate (TOPROL-XL) 100 mg 24 hr tablet, Take 1 tablet (100 mg total) by mouth daily, Disp: 90 tablet, Rfl: 5    naproxen sodium (ALEVE) 220 MG tablet, Take 1 tablet by mouth every 8 (eight) hours, Disp: , Rfl:    oxyCODONE (ROXICODONE) 10 MG TABS, Take 0 5 tablets (5 mg total) by mouth every 6 (six) hours as needed for moderate pain or severe pain for up to 20 days Max Daily Amount: 20 mg, Disp: 30 tablet, Rfl: 0    sertraline (ZOLOFT) 100 mg tablet, Take 1 tablet (100 mg total) by mouth daily, Disp: 90 tablet, Rfl: 5  No current facility-administered medications for this visit  Review of Systems   Constitutional: Negative for appetite change, chills, fatigue, fever and unexpected weight change  HENT: Negative for congestion, hearing loss and postnasal drip  Respiratory: Negative for cough and shortness of breath  Cardiovascular: Positive for leg swelling  Musculoskeletal: Positive for gait problem  Skin: Positive for wound (LLE )  Negative for rash  Neurological: Negative for numbness  Hematological: Does not bruise/bleed easily  Psychiatric/Behavioral: Negative  Objective:  /69   Pulse 77   Temp (!) 96 2 °F (35 7 °C)   Resp 18   Pain Score: 0-No pain     Physical Exam  Vitals reviewed  Constitutional:       General: He is not in acute distress  Appearance: Normal appearance  He is well-developed  He is morbidly obese  HENT:      Head: Normocephalic and atraumatic  Cardiovascular:      Rate and Rhythm: Normal rate  Pulmonary:      Effort: Pulmonary effort is normal    Musculoskeletal:         General: No deformity  Right lower leg: Edema present  Left lower leg: Edema present  Skin:     General: Skin is warm and dry  Findings: Wound (LLE) present  Comments: Beefy red wound bed with yellow slough  See wound assessment   Neurological:      General: No focal deficit present  Mental Status: He is alert and oriented to person, place, and time  Gait: Gait abnormal    Psychiatric:         Mood and Affect: Mood and affect normal          Behavior: Behavior normal  Behavior is cooperative                 Wound 06/11/21 Venous Ulcer Pretibial Left (Active)   Wound Description Beefy red;Epithelialization;Yellow;Pink 04/26/22 0923   Hui-wound Assessment Fragile; Hyperpigmented 04/26/22 0923   Wound Length (cm) 8 4 cm 04/26/22 0923   Wound Width (cm) 12 3 cm 04/26/22 0923   Wound Depth (cm) 0 1 cm 04/26/22 0923   Wound Surface Area (cm^2) 103 32 cm^2 04/26/22 0923   Wound Volume (cm^3) 10 332 cm^3 04/26/22 0923   Calculated Wound Volume (cm^3) 10 33 cm^3 04/26/22 0923   Change in Wound Size % 90 62 04/26/22 0923   Drainage Amount Large 04/26/22 0923   Drainage Description Serosanguineous 04/26/22 0923   Non-staged Wound Description Full thickness 04/19/22 0824   Treatments Irrigation with NSS 08/20/21 0914   Dressing Status Intact 04/26/22 0923          Debridement   Universal Protocol:  Consent: Verbal consent obtained  Written consent obtained  Risks and benefits: risks, benefits and alternatives were discussed  Consent given by: patient  Time out: Immediately prior to procedure a "time out" was called to verify the correct patient, procedure, equipment, support staff and site/side marked as required  Patient understanding: patient states understanding of the procedure being performed  Patient identity confirmed: verbally with patient      Performed by: PA  Debridement type: selective  Pain control: lidocaine 4%  Post-debridement measurements  Length (cm): 8 4  Width (cm): 12 3  Depth (cm): 0 1  Percent debrided: 100%  Surface Area (cm^2): 103 32  Area debrided (cm^2): 103 32  Volume (cm^3): 10 33  Devitalized tissue debrided: biofilm, exudate and slough  Instrument(s) utilized: curette  Bleeding: small  Hemostasis obtained with: pressure  Procedural pain (0-10): 0  Post-procedural pain: 0   Response to treatment: procedure was tolerated well            Wound Instructions:  Orders Placed This Encounter   Procedures    Wound cleansing and dressings     Left lower leg:     Wash your hands with soap and water    Remove old dressing, discard into plastic bag and place in trash  Cleanse the wound with a 10 minute prophase  soak prior to applying a clean dressing  Do not use tissue or cotton balls  Do not scrub the wound  Pat dry using gauze      Ok to shower on dressing change days            Apply a barrier cream around the wound, only apply to intact skin  Calmoseptine applied today at wound center or anything with zinc will protect the intact skin  Apply Polymem to the wound bed, cover with Optilock , rolled gauze and secure tape  Apply surepress for compression  Change dressing every day (wife to do dressing changes)                 Apply compression to your affected Leg(s) from mid-foot to knee making sure to cover the heel  Apply in the morning and re-wrap as needed during the day if wrap becomes loose  Remove at bedtime and elevate legs or lie down      Avoid prolonged standing in one place      Elevate leg(s) above the level of the heart when sitting or as much as possible        This was today at wound center     Standing Status:   Future     Standing Expiration Date:   4/26/2023       Lisa Quinonez PA-C, [facility]      Portions of the record may have been created with voice recognition software  Occasional wrong word or "sound alike" substitutions may have occurred due to the inherent limitations of voice recognition software  Read the chart carefully and recognize, using context, where substitutions have occurred

## 2022-05-03 ENCOUNTER — OFFICE VISIT (OUTPATIENT)
Dept: WOUND CARE | Facility: CLINIC | Age: 62
End: 2022-05-03
Payer: COMMERCIAL

## 2022-05-03 VITALS
SYSTOLIC BLOOD PRESSURE: 163 MMHG | HEART RATE: 75 BPM | RESPIRATION RATE: 18 BRPM | TEMPERATURE: 97.4 F | DIASTOLIC BLOOD PRESSURE: 84 MMHG

## 2022-05-03 DIAGNOSIS — I87.332 CHRONIC VENOUS HYPERTENSION (IDIOPATHIC) WITH ULCER AND INFLAMMATION OF LEFT LOWER EXTREMITY (CODE) (HCC): Primary | ICD-10-CM

## 2022-05-03 DIAGNOSIS — L97.228 NON-PRESSURE CHRONIC ULCER OF LEFT CALF WITH OTHER SPECIFIED SEVERITY (HCC): ICD-10-CM

## 2022-05-03 DIAGNOSIS — E66.01 MORBID OBESITY WITH BMI OF 50.0-59.9, ADULT (HCC): ICD-10-CM

## 2022-05-03 PROCEDURE — 97598 DBRDMT OPN WND ADDL 20CM/<: CPT | Performed by: ORTHOPAEDIC SURGERY

## 2022-05-03 PROCEDURE — 97597 DBRDMT OPN WND 1ST 20 CM/<: CPT | Performed by: ORTHOPAEDIC SURGERY

## 2022-05-03 RX ORDER — LIDOCAINE HYDROCHLORIDE 40 MG/ML
5 SOLUTION TOPICAL ONCE
Status: COMPLETED | OUTPATIENT
Start: 2022-05-03 | End: 2022-05-03

## 2022-05-03 RX ADMIN — LIDOCAINE HYDROCHLORIDE 5 ML: 40 SOLUTION TOPICAL at 09:00

## 2022-05-03 NOTE — PROGRESS NOTES
Patient ID: Orestes De La Paz is a 62 y o  male Date of Birth 1960       Chief Complaint   Patient presents with    Follow Up Wound Care Visit     left lower extemity ulcer       Allergies:  Hydromorphone    Diagnosis:   Diagnosis ICD-10-CM Associated Orders   1  Chronic venous hypertension (idiopathic) with ulcer and inflammation of left lower extremity (CODE) ([facility])  I87 332 lidocaine (XYLOCAINE) 4 % topical solution 5 mL     Wound cleansing and dressings   2  Non-pressure chronic ulcer of left calf with other specified severity ([place] [address] )  L97 228    3  Morbid obesity with BMI of 50 0-59 9, adult ([place] [address] )  E66 01     Z68 43         Assessment and Plan :  · LLE wound progressively healing  · Debrided as below  · Continue Prophase soaks and resume with Polymem to wound bed and Calmoseptine to periwound, see wound orders below  · Continue Surepress for compression  · No harsh cleansers such as alcohol, peroxide, or antibacterial soap, do not submerge in water  · Continue frequent elevation of leg and increase exercise/walking  · Continue to consume protein and encouraged to loose weight    · Followup in 1 week or call sooner with questions or concerns      Subjective:   Patient presents for followup of LLE venous ulcer  No issues or complaints  Pt has been applying Polymem to wound bed covered with Optilock for drainage and Surepress for compression  Denies fever or chills          The following portions of the patient's history were reviewed and updated as appropriate:   Patient Active Problem List   Diagnosis    Venous ulcer of left lower extremity with varicose veins (HCC)    Acute blood loss anemia    Bleeding from varicose vein    Essential hypertension    Chronic venous hypertension (idiopathic) with ulcer and inflammation of left lower extremity (CODE) ([facility] )    Morbid obesity with BMI of 50 0-59 9, adult ([facility] )    Obstructive sleep apnea treated with continuous positive airway pressure (CPAP)     Past Medical History:   Diagnosis Date    Anxiety     CPAP (continuous positive airway pressure) dependence     Hypertension     Sleep apnea     Venous stasis ulcers of both lower extremities (HCC)      Past Surgical History:   Procedure Laterality Date    HERNIA REPAIR      umbilical    [place] ENDOVENOUS LASER, 1ST VEIN Left 8/12/2021    Procedure: ENDOVASCULAR LASER THERAPY (EVLT), Left GSV;  Surgeon: Jesus Collazo MD;  Location: [facility];  Service: Vascular    TONSILLECTOMY       No family history on file  Social History     Socioeconomic History    Marital status: /Civil Union     Spouse name: None    Number of children: None    Years of education: None    Highest education level: None   Occupational History    None   Tobacco Use    Smoking status: Never Smoker    Smokeless tobacco: Never Used   Vaping Use    Vaping Use: Never used   Substance and Sexual Activity    Alcohol use:  Yes     Alcohol/week: 3 0 standard drinks     Types: 2 Cans of beer, 1 Shots of liquor per week     Comment: daily    Drug use: Never    Sexual activity: None   Other Topics Concern    None   Social History Narrative    None     Social Determinants of Health     Financial Resource Strain: Not on file   Food Insecurity: Not on file   Transportation Needs: Not on file   Physical Activity: Not on file   Stress: Not on file   Social Connections: Not on file   Intimate Partner Violence: Not on file   Housing Stability: Not on file       Current Outpatient Medications:     fexofenadine (ALLEGRA) 180 MG tablet, Take 1 tablet by mouth daily, Disp: , Rfl:     lisinopril-hydrochlorothiazide (PRINZIDE,ZESTORETIC) 20-12 5 MG per tablet, Take 2 tablets by mouth daily, Disp: 90 tablet, Rfl: 3    metoprolol succinate (TOPROL-XL) 100 mg 24 hr tablet, Take 1 tablet (100 mg total) by mouth daily, Disp: 90 tablet, Rfl: 5    naproxen sodium (ALEVE) 220 MG tablet, Take 1 tablet by mouth every 8 (eight) hours, Disp: , Rfl:    oxyCODONE (ROXICODONE) 10 MG TABS, Take 0 5 tablets (5 mg total) by mouth every 6 (six) hours as needed for moderate pain or severe pain for up to 20 days Max Daily Amount: 20 mg, Disp: 30 tablet, Rfl: 0    sertraline (ZOLOFT) 100 mg tablet, Take 1 tablet (100 mg total) by mouth daily, Disp: 90 tablet, Rfl: 5  No current facility-administered medications for this visit  Review of Systems   Constitutional: Negative for appetite change, chills, fatigue, fever and unexpected weight change  HENT: Negative for congestion, hearing loss and postnasal drip  Respiratory: Negative for cough and shortness of breath  Cardiovascular: Positive for leg swelling  Musculoskeletal: Positive for gait problem  Skin: Positive for wound (LLE )  Negative for rash  Neurological: Negative for numbness  Hematological: Does not bruise/bleed easily  Psychiatric/Behavioral: Negative  Objective:  /84   Pulse 75   Temp (!) 97 4 °F (36 3 °C)   Resp 18   Pain Score:   3     Physical Exam  Vitals reviewed  Constitutional:       General: He is not in acute distress  Appearance: Normal appearance  He is well-developed  He is morbidly obese  HENT:      Head: Normocephalic and atraumatic  Cardiovascular:      Rate and Rhythm: Normal rate  Pulmonary:      Effort: Pulmonary effort is normal    Musculoskeletal:         General: No deformity  Right lower leg: Edema present  Left lower leg: Edema present  Skin:     General: Skin is warm and dry  Findings: Wound (LLE) present  Comments: Beefy red wound bed with Biofilm and yellow slough  See wound assessment   Neurological:      General: No focal deficit present  Mental Status: He is alert and oriented to person, place, and time  Gait: Gait abnormal    Psychiatric:         Mood and Affect: Mood and affect normal          Behavior: Behavior normal  Behavior is cooperative               Wound 06/11/21 Venous Ulcer Pretibial Left (Active)   Wound Image Images linked 05/03/22 0854   Wound Description Beefy red;Epithelialization;Pink;Yellow 05/03/22 0856   Hui-wound Assessment Fragile; Hyperpigmented 05/03/22 0856   Wound Length (cm) 8 5 cm 05/03/22 0856   Wound Width (cm) 12 5 cm 05/03/22 0856   Wound Depth (cm) 0 1 cm 05/03/22 0856   Wound Surface Area (cm^2) 106 25 cm^2 05/03/22 0856   Wound Volume (cm^3) 10 625 cm^3 05/03/22 0856   Calculated Wound Volume (cm^3) 10 63 cm^3 05/03/22 0856   Change in Wound Size % 90 35 05/03/22 0856   Drainage Amount Large 05/03/22 0856   Drainage Description Serosanguineous 05/03/22 0856   Non-staged Wound Description Full thickness 05/03/22 0856   Dressing Status Intact 05/03/22 0856       Debridement   Wound 06/11/21 Venous Ulcer Pretibial Left    Universal Protocol:  Consent: Verbal consent obtained  Written consent obtained  Risks and benefits: risks, benefits and alternatives were discussed  Consent given by: patient  Time out: Immediately prior to procedure a "time out" was called to verify the correct patient, procedure, equipment, support staff and site/side marked as required    Patient understanding: patient states understanding of the procedure being performed  Patient identity confirmed: verbally with patient      Performed by: PA  Debridement type: selective  Pain control: lidocaine 4%  Post-debridement measurements  Length (cm): 8 5  Width (cm): 12 5  Depth (cm): 0 1  Percent debrided: 100%  Surface Area (cm^2): 106 25  Area debrided (cm^2): 106 25  Volume (cm^3): 10 63  Devitalized tissue debrided: biofilm and slough  Instrument(s) utilized: curette  Bleeding: small  Hemostasis obtained with: pressure  Procedural pain (0-10): 0  Post-procedural pain: 0   Response to treatment: procedure was tolerated well           Wound Instructions:  Orders Placed This Encounter   Procedures    Wound cleansing and dressings     Wound cleansing and dressings      Left lower leg:     Wash your hands with soap and water  Remove old dressing, discard into plastic bag and place in trash  Cleanse the wound with a 10 minute prophase  soak prior to applying a clean dressing  Do not use tissue or cotton balls  Do not scrub the wound  Pat dry using gauze      Ok to shower on dressing change days            Apply a barrier cream around the wound, only apply to intact skin  Calmoseptine applied today at wound center or anything with zinc will protect the intact skin  Apply Polymem to the wound bed, cover with Optilock , rolled gauze and secure tape  Apply surepress for compression  Change dressing every day (wife to do dressing changes)                 Apply compression to your affected Leg(s) from mid-foot to knee making sure to cover the heel  Apply in the morning and re-wrap as needed during the day if wrap becomes loose  Remove at bedtime and elevate legs or lie down      Avoid prolonged standing in one place      Elevate leg(s) above the level of the heart when sitting or as much as possible        This was today at wound center     Standing Status:   Future     Standing Expiration Date:   5/3/2023       Shalonda Brcok PA-C, [facility]      Portions of the record may have been created with voice recognition software  Occasional wrong word or "sound alike" substitutions may have occurred due to the inherent limitations of voice recognition software  Read the chart carefully and recognize, using context, where substitutions have occurred

## 2022-05-03 NOTE — PATIENT INSTRUCTIONS
Orders Placed This Encounter   Procedures    Wound cleansing and dressings     Wound cleansing and dressings      Left lower leg:     Wash your hands with soap and water  Remove old dressing, discard into plastic bag and place in trash  Cleanse the wound with a 10 minute prophase  soak prior to applying a clean dressing  Do not use tissue or cotton balls  Do not scrub the wound  Pat dry using gauze      Ok to shower on dressing change days            Apply a barrier cream around the wound, only apply to intact skin  Calmoseptine applied today at [facility] center or anything with zinc will protect the intact skin  Apply Polymem to the wound bed, cover with Optilock , rolled gauze and secure tape  Apply surepress for compression  Change dressing every day (wife to do dressing changes)                 Apply compression to your affected Leg(s) from mid-foot to knee making sure to cover the heel  Apply in the morning and re-wrap as needed during the day if wrap becomes loose   Remove at bedtime and elevate legs or lie down      Avoid prolonged standing in one place      Elevate leg(s) above the level of the heart when sitting or as much as possible        This was today at [facility] center     Standing Status:   Future     Standing Expiration Date:   5/3/2023

## 2022-05-10 ENCOUNTER — OFFICE VISIT (OUTPATIENT)
Dept: WOUND CARE | Facility: CLINIC | Age: 62
End: 2022-05-10
Payer: COMMERCIAL

## 2022-05-10 VITALS
SYSTOLIC BLOOD PRESSURE: 156 MMHG | DIASTOLIC BLOOD PRESSURE: 93 MMHG | HEART RATE: 82 BPM | TEMPERATURE: 97 F | RESPIRATION RATE: 18 BRPM

## 2022-05-10 DIAGNOSIS — E66.01 MORBID OBESITY WITH BMI OF 50.0-59.9, ADULT (HCC): ICD-10-CM

## 2022-05-10 DIAGNOSIS — I87.332 CHRONIC VENOUS HYPERTENSION (IDIOPATHIC) WITH ULCER AND INFLAMMATION OF LEFT LOWER EXTREMITY (CODE) (HCC): Primary | ICD-10-CM

## 2022-05-10 DIAGNOSIS — L97.228 NON-PRESSURE CHRONIC ULCER OF LEFT CALF WITH OTHER SPECIFIED SEVERITY (HCC): ICD-10-CM

## 2022-05-10 PROCEDURE — 17250 CHEM CAUT OF GRANLTJ TISSUE: CPT | Performed by: ORTHOPAEDIC SURGERY

## 2022-05-10 RX ORDER — LIDOCAINE HYDROCHLORIDE 40 MG/ML
5 SOLUTION TOPICAL ONCE
Status: COMPLETED | OUTPATIENT
Start: 2022-05-10 | End: 2022-05-10

## 2022-05-10 RX ADMIN — LIDOCAINE HYDROCHLORIDE 5 ML: 40 SOLUTION TOPICAL at 09:23

## 2022-05-10 NOTE — PROGRESS NOTES
Patient ID: Beverly Abbott is a 62 y o  male Date of Birth 1960       Chief Complaint   Patient presents with    Follow Up Wound Care Visit     left leg wound       Allergies:  Hydromorphone    Diagnosis:   Diagnosis ICD-10-CM Associated Orders   1  Chronic venous hypertension (idiopathic) with ulcer and inflammation of left lower extremity (CODE) ([facility])  I87 332 lidocaine (XYLOCAINE) 4 % topical solution 5 mL     Wound cleansing and dressings   2  Non-pressure chronic ulcer of left calf with other specified severity ([place] [address] )  L97 228    3  Morbid obesity with BMI of 50 0-59 9, adult ([place] [address] )  E66 01     Z68 43         Assessment and Plan :  · LLE wound progressively healing  · Chemically cauterized as below  · Continue Prophase soaks and resume with Polymem to wound bed and Calmoseptine to periwound, see wound orders below  · Continue Surepress for compression  · No harsh cleansers such as alcohol, peroxide, or antibacterial soap, do not submerge in water  · Continue frequent elevation of leg and increase exercise/walking  · Continue to consume protein and encouraged to loose weight    · Followup in 2 weeks or call sooner with questions or concerns      Subjective:   Patient presents for followup of LLE venous ulcer  No issues  Pt has been applying Polymem to wound bed covered with Optilock for drainage and Surepress for compression  Denies fever or chills          The following portions of the patient's history were reviewed and updated as appropriate:   Patient Active Problem List   Diagnosis    Venous ulcer of left lower extremity with varicose veins (HCC)    Acute blood loss anemia    Bleeding from varicose vein    Essential hypertension    Chronic venous hypertension (idiopathic) with ulcer and inflammation of left lower extremity (CODE) ([place] [address] )    Morbid obesity with BMI of 50 0-59 9, adult ([place] [address] )    Obstructive sleep apnea treated with continuous positive airway pressure (CPAP)     Past Medical History:   Diagnosis Date    Anxiety     CPAP (continuous positive airway pressure) dependence     Hypertension     Sleep apnea     Venous stasis ulcers of both lower extremities (HCC)      Past Surgical History:   Procedure Laterality Date    HERNIA REPAIR      umbilical    [place] ENDOVENOUS LASER, 1ST VEIN Left 8/12/2021    Procedure: ENDOVASCULAR LASER THERAPY (EVLT), Left GSV;  Surgeon: Norman Moura MD;  Location: [facility];  Service: Vascular    TONSILLECTOMY       No family history on file  Social History     Socioeconomic History    Marital status: /Civil Union     Spouse name: Not on file    Number of children: Not on file    Years of education: Not on file    Highest education level: Not on file   Occupational History    Not on file   Tobacco Use    Smoking status: Never Smoker    Smokeless tobacco: Never Used   Vaping Use    Vaping Use: Never used   Substance and Sexual Activity    Alcohol use:  Yes     Alcohol/week: 3 0 standard drinks     Types: 2 Cans of beer, 1 Shots of liquor per week     Comment: daily    Drug use: Never    Sexual activity: Not on file   Other Topics Concern    Not on file   Social History Narrative    Not on file     Social Determinants of Health     Financial Resource Strain: Not on file   Food Insecurity: Not on file   Transportation Needs: Not on file   Physical Activity: Not on file   Stress: Not on file   Social Connections: Not on file   Intimate Partner Violence: Not on file   Housing Stability: Not on file       Current Outpatient Medications:     fexofenadine (ALLEGRA) 180 MG tablet, Take 1 tablet by mouth daily, Disp: , Rfl:     lisinopril-hydrochlorothiazide (PRINZIDE,ZESTORETIC) 20-12 5 MG per tablet, Take 2 tablets by mouth daily, Disp: 90 tablet, Rfl: 3    metoprolol succinate (TOPROL-XL) 100 mg 24 hr tablet, Take 1 tablet (100 mg total) by mouth daily, Disp: 90 tablet, Rfl: 5    naproxen sodium (ALEVE) 220 MG tablet, Take 1 tablet by mouth every 8 (eight) hours, Disp: , Rfl:     sertraline (ZOLOFT) 100 mg tablet, Take 1 tablet (100 mg total) by mouth daily, Disp: 90 tablet, Rfl: 5  No current facility-administered medications for this visit  Review of Systems   Constitutional: Negative for appetite change, chills, fatigue, fever and unexpected weight change  HENT: Negative for congestion, hearing loss and postnasal drip  Respiratory: Negative for cough and shortness of breath  Cardiovascular: Positive for leg swelling  Musculoskeletal: Positive for gait problem  Skin: Positive for wound (LLE )  Negative for rash  Neurological: Negative for numbness  Hematological: Does not bruise/bleed easily  Psychiatric/Behavioral: Negative  Objective:  /93   Pulse 82   Temp (!) 97 °F (36 1 °C)   Resp 18   Pain Score: 0-No pain     Physical Exam  Vitals reviewed  Constitutional:       General: He is not in acute distress  Appearance: Normal appearance  He is well-developed  He is morbidly obese  HENT:      Head: Normocephalic and atraumatic  Cardiovascular:      Rate and Rhythm: Normal rate  Pulmonary:      Effort: Pulmonary effort is normal    Musculoskeletal:         General: No deformity  Right lower leg: Edema present  Left lower leg: Edema present  Skin:     General: Skin is warm and dry  Findings: Wound (LLE) present  Comments: Beefy red wound bed with hypergranulated tissue and Biofilm  See wound assessment   Neurological:      General: No focal deficit present  Mental Status: He is alert and oriented to person, place, and time  Gait: Gait abnormal    Psychiatric:         Mood and Affect: Mood and affect normal          Behavior: Behavior normal  Behavior is cooperative  Wound 06/11/21 Venous Ulcer Pretibial Left (Active)   Wound Image Images linked 05/10/22 0770   Wound Description Beefy red;Epithelialization;Yellow; Hypergranulation 05/10/22 5462 Hui-wound Assessment Fragile; Hyperpigmented 05/10/22 0924   Wound Length (cm) 8 8 cm 05/10/22 0924   Wound Width (cm) 12 5 cm 05/10/22 0924   Wound Depth (cm) 0 1 cm 05/10/22 0924   Wound Surface Area (cm^2) 110 cm^2 05/10/22 0924   Wound Volume (cm^3) 11 cm^3 05/10/22 0924   Calculated Wound Volume (cm^3) 11 cm^3 05/10/22 0924   Change in Wound Size % 90 01 05/10/22 0924   Drainage Amount Large 05/10/22 0924   Drainage Description Serosanguineous 05/10/22 0924   Non-staged Wound Description Full thickness 05/10/22 0924   Dressing Status Intact 05/10/22 0924             Chemical Caut Of A Wound     Date/Time 5/10/2022 10:12 AM     Performed by  Mary Gudino PA-C     Authorized by Mary Gudino PA-C       Associated Wounds:   Wound 06/11/21 Venous Ulcer Pretibial Left     Universal Protocol   Consent: Verbal consent obtained  Written consent obtained  Risks and benefits: risks, benefits and alternatives were discussed  Consent given by: patient  Time out: Immediately prior to procedure a "time out" was called to verify the correct patient, procedure, equipment, support staff and site/side marked as required  Patient understanding: patient states understanding of the procedure being performed  Patient identity confirmed: verbally with patient        Local anesthesia used: yes     Anesthesia   Local anesthesia used: yes  Local Anesthetic: topical anesthetic     Sedation   Patient sedated: no        Specimen: no    Culture: no   Procedure Details   Procedure Notes: After permission and placement of topical local anesthetic, 3 Silver nitrate stick(s) were used to cauterize the hypergranular tissue of the open wound  Normal saline was used to neutralize the reaction  A dressing was applied, see orders  Patient tolerated procedure well        Patient tolerance: Patient tolerated the procedure well with no immediate complications                      Wound Instructions:  Orders Placed This Encounter   Procedures  Wound cleansing and dressings     Left lower leg:     Wash your hands with soap and water  Remove old dressing, discard into plastic bag and place in trash  Cleanse the wound with a 10 minute prophase  soak prior to applying a clean dressing  Do not use tissue or cotton balls  Do not scrub the wound  Pat dry using gauze      Ok to shower on dressing change days            Apply a barrier cream around the wound, only apply to intact skin  Calmoseptine applied today at wound center or anything with zinc will protect the intact skin  Apply Polymem to the wound bed, cover with Optilock , rolled gauze and secure tape  Apply surepress for compression  Change dressing every day (wife to do dressing changes)                 Apply compression to your affected Leg(s) from mid-foot to knee making sure to cover the heel  Apply in the morning and re-wrap as needed during the day if wrap becomes loose  Remove at bedtime and elevate legs or lie down      Avoid prolonged standing in one place      Elevate leg(s) above the level of the heart when sitting or as much as possible        This was today at wound center     Standing Status:   Future     Standing Expiration Date:   5/10/2023       Tal Lehman PA-C, [facility]      Portions of the record may have been created with voice recognition software  Occasional wrong word or "sound alike" substitutions may have occurred due to the inherent limitations of voice recognition software  Read the chart carefully and recognize, using context, where substitutions have occurred

## 2022-05-10 NOTE — PATIENT INSTRUCTIONS
Orders Placed This Encounter   Procedures    Wound cleansing and dressings     Left lower leg:     Wash your hands with soap and water  Remove old dressing, discard into plastic bag and place in trash  Cleanse the wound with a 10 minute prophase  soak prior to applying a clean dressing  Do not use tissue or cotton balls  Do not scrub the wound  Pat dry using gauze      Ok to shower on dressing change days            Apply a barrier cream around the wound, only apply to intact skin  Calmoseptine applied today at [facility] center or anything with zinc will protect the intact skin  Apply Polymem to the wound bed, cover with Optilock , rolled gauze and secure tape  Apply surepress for compression  Change dressing every day (wife to do dressing changes)                 Apply compression to your affected Leg(s) from mid-foot to knee making sure to cover the heel  Apply in the morning and re-wrap as needed during the day if wrap becomes loose   Remove at bedtime and elevate legs or lie down      Avoid prolonged standing in one place      Elevate leg(s) above the level of the heart when sitting or as much as possible        This was today at [facility]     Standing Status:   Future     Standing Expiration Date:   5/10/2023

## 2022-05-23 ENCOUNTER — OFFICE VISIT (OUTPATIENT)
Dept: WOUND CARE | Facility: CLINIC | Age: 62
End: 2022-05-23
Payer: COMMERCIAL

## 2022-05-23 VITALS
HEART RATE: 92 BPM | TEMPERATURE: 97 F | DIASTOLIC BLOOD PRESSURE: 88 MMHG | RESPIRATION RATE: 18 BRPM | SYSTOLIC BLOOD PRESSURE: 190 MMHG

## 2022-05-23 DIAGNOSIS — L97.228 NON-PRESSURE CHRONIC ULCER OF LEFT CALF WITH OTHER SPECIFIED SEVERITY (HCC): Primary | ICD-10-CM

## 2022-05-23 DIAGNOSIS — E66.01 MORBID OBESITY WITH BMI OF 50.0-59.9, ADULT (HCC): ICD-10-CM

## 2022-05-23 DIAGNOSIS — I87.332 CHRONIC VENOUS HYPERTENSION (IDIOPATHIC) WITH ULCER AND INFLAMMATION OF LEFT LOWER EXTREMITY (CODE) (HCC): ICD-10-CM

## 2022-05-23 PROCEDURE — 17250 CHEM CAUT OF GRANLTJ TISSUE: CPT | Performed by: ORTHOPAEDIC SURGERY

## 2022-05-23 RX ORDER — LIDOCAINE HYDROCHLORIDE 40 MG/ML
5 SOLUTION TOPICAL ONCE
Status: COMPLETED | OUTPATIENT
Start: 2022-05-23 | End: 2022-05-23

## 2022-05-23 RX ADMIN — LIDOCAINE HYDROCHLORIDE 5 ML: 40 SOLUTION TOPICAL at 08:44

## 2022-05-23 NOTE — PATIENT INSTRUCTIONS
Orders Placed This Encounter   Procedures    Wound cleansing and dressings     Left lower leg:     Wash your hands with soap and water  Remove old dressing, discard into plastic bag and place in trash  Cleanse the wound with a 10 minute prophase  soak prior to applying a clean dressing  Do not use tissue or cotton balls  Do not scrub the wound  Pat dry using gauze  Ok to shower on dressing change days  Apply a barrier cream around the wound, only apply to intact skin  Calmoseptine applied today at wound center or anything with zinc will protect the intact skin  Apply Polymem to the wound bed, cover with Optilock , rolled gauze and secure tape  Apply surepress for compression  Change dressing every day (wife to do dressing changes)If there is not a lot of drainage the dressing can be changed every other day  Apply compression to your affected Leg(s) from mid-foot to knee making sure to cover the heel  Apply in the morning and re-wrap as needed during the day if wrap becomes loose  Remove at bedtime and elevate legs or lie down  Avoid prolonged standing in one place       Elevate leg(s) above the level of the heart when sitting or as much as possible        This was today at wound center     Standing Status:   Future     Standing Expiration Date:   5/23/2023

## 2022-05-23 NOTE — PROGRESS NOTES
Patient ID: Katie Lozoya is a 62 y o  male Date of Birth 1960       Chief Complaint   Patient presents with    Follow Up Wound Care Visit     LLE WOUND       Allergies:  Hydromorphone    Diagnosis:   Diagnosis ICD-10-CM Associated Orders   1  Non-pressure chronic ulcer of left calf with other specified severity ([facility])  L97 228 lidocaine (XYLOCAINE) 4 % topical solution 5 mL     Wound cleansing and dressings   2  Chronic venous hypertension (idiopathic) with ulcer and inflammation of left lower extremity (CODE) ([facility])  I87 332    3  Morbid obesity with BMI of 50 0-59 9, adult ([facility])  E66 01     Z68 43         Assessment and Plan :  · LLE wound progressively healing  · Chemically cauterized as below  · Continue Prophase soaks and resume with Polymem to wound bed and Calmoseptine to periwound, see wound orders below  · Continue Surepress for compression  · No harsh cleansers such as alcohol, peroxide, or antibacterial soap, do not submerge in water  · Continue frequent elevation of leg and increase exercise/walking  · Continue to consume protein and encouraged to loose weight    · Followup in 2 weeks or call sooner with questions or concerns      Subjective:   Patient presents for followup of LLE venous ulcer  No issues or complaints      The following portions of the patient's history were reviewed and updated as appropriate:   Patient Active Problem List   Diagnosis    Venous ulcer of left lower extremity with varicose veins ([facility])    Acute blood loss anemia    Bleeding from varicose vein    Essential hypertension    Chronic venous hypertension (idiopathic) with ulcer and inflammation of left lower extremity (CODE) ([facility] )    Morbid obesity with BMI of 50 0-59 9, adult ([facility] )    Obstructive sleep apnea treated with continuous positive airway pressure (CPAP)     Past Medical History:   Diagnosis Date    Anxiety     CPAP (continuous positive airway pressure) dependence     Hypertension     Sleep apnea     Venous stasis ulcers of both lower extremities (HCC)      Past Surgical History:   Procedure Laterality Date    HERNIA REPAIR      umbilical    [place] ENDOVENOUS LASER, 1ST VEIN Left 8/12/2021    Procedure: ENDOVASCULAR LASER THERAPY (EVLT), Left GSV;  Surgeon: Emely Gallegos MD;  Location: [facility];  Service: Vascular    TONSILLECTOMY       No family history on file  Social History     Socioeconomic History    Marital status: /Civil Union     Spouse name: Not on file    Number of children: Not on file    Years of education: Not on file    Highest education level: Not on file   Occupational History    Not on file   Tobacco Use    Smoking status: Never Smoker    Smokeless tobacco: Never Used   Vaping Use    Vaping Use: Never used   Substance and Sexual Activity    Alcohol use:  Yes     Alcohol/week: 3 0 standard drinks     Types: 2 Cans of beer, 1 Shots of liquor per week     Comment: daily    Drug use: Never    Sexual activity: Not on file   Other Topics Concern    Not on file   Social History Narrative    Not on file     Social Determinants of Health     Financial Resource Strain: Not on file   Food Insecurity: Not on file   Transportation Needs: Not on file   Physical Activity: Not on file   Stress: Not on file   Social Connections: Not on file   Intimate Partner Violence: Not on file   Housing Stability: Not on file       Current Outpatient Medications:     fexofenadine (ALLEGRA) 180 MG tablet, Take 1 tablet by mouth daily, Disp: , Rfl:     lisinopril-hydrochlorothiazide (PRINZIDE,ZESTORETIC) 20-12 5 MG per tablet, Take 2 tablets by mouth daily, Disp: 90 tablet, Rfl: 3    metoprolol succinate (TOPROL-XL) 100 mg 24 hr tablet, Take 1 tablet (100 mg total) by mouth daily, Disp: 90 tablet, Rfl: 5    naproxen sodium (ALEVE) 220 MG tablet, Take 1 tablet by mouth every 8 (eight) hours, Disp: , Rfl:     sertraline (ZOLOFT) 100 mg tablet, Take 1 tablet (100 mg total) by mouth daily, Disp: 90 tablet, Rfl: 5  No current facility-administered medications for this visit  Review of Systems   Constitutional: Negative for appetite change, chills, fatigue, fever and unexpected weight change  HENT: Negative for congestion, hearing loss and postnasal drip  Respiratory: Negative for cough and shortness of breath  Cardiovascular: Positive for leg swelling  Musculoskeletal: Positive for gait problem  Skin: Positive for wound (LLE )  Negative for rash  Neurological: Negative for numbness  Hematological: Does not bruise/bleed easily  Psychiatric/Behavioral: Negative  Objective:  BP (!) 190/88   Pulse 92   Temp (!) 97 °F (36 1 °C)   Resp 18   Pain Score:   3     Physical Exam  Vitals reviewed  Constitutional:       General: He is not in acute distress  Appearance: Normal appearance  He is well-developed  He is morbidly obese  HENT:      Head: Normocephalic and atraumatic  Cardiovascular:      Rate and Rhythm: Normal rate  Pulmonary:      Effort: Pulmonary effort is normal    Musculoskeletal:         General: No deformity  Right lower leg: Edema present  Left lower leg: Edema present  Skin:     General: Skin is warm and dry  Findings: Wound (LLE) present  Comments: Beefy red wound bed with hypergranulated tissue and Biofilm  See wound assessment   Neurological:      General: No focal deficit present  Mental Status: He is alert and oriented to person, place, and time  Gait: Gait abnormal    Psychiatric:         Mood and Affect: Mood and affect normal          Behavior: Behavior normal  Behavior is cooperative  Chemical Caut Of A Wound     Date/Time 5/23/2022 10:50 AM     Performed by  Spencer Greenfield PA-C     Authorized by Spencer Greenfield PA-C       Associated wounds:   Wound 06/11/21 Venous Ulcer Pretibial Left   Universal Protocol   Consent: Verbal consent obtained  Written consent obtained    Risks and benefits: risks, benefits and alternatives were discussed  Consent given by: patient  Time out: Immediately prior to procedure a "time out" was called to verify the correct patient, procedure, equipment, support staff and site/side marked as required  Patient understanding: patient states understanding of the procedure being performed  Patient identity confirmed: verbally with patient        Local anesthesia used: yes     Anesthesia   Local anesthesia used: yes  Local Anesthetic: topical anesthetic     Sedation   Patient sedated: no        Specimen: no    Culture: no   Procedure Details   Procedure Notes: After permission and placement of topical local anesthetic, 4 Silver nitrate stick(s) were used to cauterize the hypergranular tissue of the open wound  Normal saline was used to neutralize the reaction  A dressing was applied, see orders  Patient tolerated procedure well  Patient tolerance: Patient tolerated the procedure well with no immediate complications              Wound Instructions:  Orders Placed This Encounter   Procedures    Wound cleansing and dressings     Left lower leg:     Wash your hands with soap and water  Remove old dressing, discard into plastic bag and place in trash  Cleanse the wound with a 10 minute prophase  soak prior to applying a clean dressing  Do not use tissue or cotton balls  Do not scrub the wound  Pat dry using gauze      Ok to shower on dressing change days            Apply a barrier cream around the wound, only apply to intact skin  Calmoseptine applied today at wound center or anything with zinc will protect the intact skin  Apply Polymem to the wound bed, cover with Optilock , rolled gauze and secure tape  Apply surepress for compression    Change dressing every day (wife to do dressing changes)If there is not a lot of drainage the dressing can be changed every other day                   Apply compression to your affected Leg(s) from mid-foot to knee making sure to cover the heel  Apply in the morning and re-wrap as needed during the day if wrap becomes loose  Remove at bedtime and elevate legs or lie down      Avoid prolonged standing in one place      Elevate leg(s) above the level of the heart when sitting or as much as possible        This was today at wound center     Standing Status:   Future     Standing Expiration Date:   5/23/2023       Elizabeth Farfan PA-C, [facility]      Portions of the record may have been created with voice recognition software  Occasional wrong word or "sound alike" substitutions may have occurred due to the inherent limitations of voice recognition software  Read the chart carefully and recognize, using context, where substitutions have occurred

## 2022-06-07 ENCOUNTER — OFFICE VISIT (OUTPATIENT)
Dept: WOUND CARE | Facility: CLINIC | Age: 62
End: 2022-06-07
Payer: COMMERCIAL

## 2022-06-07 VITALS
TEMPERATURE: 96.8 F | DIASTOLIC BLOOD PRESSURE: 83 MMHG | RESPIRATION RATE: 18 BRPM | HEART RATE: 77 BPM | SYSTOLIC BLOOD PRESSURE: 159 MMHG

## 2022-06-07 DIAGNOSIS — E66.01 MORBID OBESITY WITH BMI OF 50.0-59.9, ADULT (HCC): ICD-10-CM

## 2022-06-07 DIAGNOSIS — L97.228 NON-PRESSURE CHRONIC ULCER OF LEFT CALF WITH OTHER SPECIFIED SEVERITY (HCC): Primary | ICD-10-CM

## 2022-06-07 DIAGNOSIS — I87.332 CHRONIC VENOUS HYPERTENSION (IDIOPATHIC) WITH ULCER AND INFLAMMATION OF LEFT LOWER EXTREMITY (CODE) (HCC): ICD-10-CM

## 2022-06-07 PROCEDURE — 17250 CHEM CAUT OF GRANLTJ TISSUE: CPT | Performed by: ORTHOPAEDIC SURGERY

## 2022-06-07 RX ORDER — LIDOCAINE HYDROCHLORIDE 40 MG/ML
5 SOLUTION TOPICAL ONCE
Status: COMPLETED | OUTPATIENT
Start: 2022-06-07 | End: 2022-06-07

## 2022-06-07 RX ADMIN — LIDOCAINE HYDROCHLORIDE 5 ML: 40 SOLUTION TOPICAL at 09:27

## 2022-06-07 NOTE — PATIENT INSTRUCTIONS
Orders Placed This Encounter   Procedures    Wound cleansing and dressings     Wound cleansing and dressings   Left lower leg:     Wash your hands with soap and water  Remove old dressing, discard into plastic bag and place in trash  Cleanse the wound with a 10 minute prophase  soak prior to applying a clean dressing  Do not use tissue or cotton balls  Do not scrub the wound  Pat dry using gauze  Ok to shower on dressing change days  Apply a barrier cream around the wound, only apply to intact skin  Calmoseptine applied today at wound center or anything with zinc will protect the intact skin  Apply Polymem to the wound bed, cover with Optilock , rolled gauze and secure tape  Apply surepress for compression  Change dressing every day (wife to do dressing changes)If there is not a lot of drainage the dressing can be changed every other day  Apply compression to your affected Leg(s) from mid-foot to knee making sure to cover the heel  Apply in the morning and re-wrap as needed during the day if wrap becomes loose  Remove at bedtime and elevate legs or lie down  Avoid prolonged standing in one place       Elevate leg(s) above the level of the heart when sitting or as much as possible        This was today at wound center     Standing Status:   Future     Standing Expiration Date:   6/7/2023

## 2022-06-07 NOTE — PROGRESS NOTES
Patient ID: Arlene Hassan is a 62 y o  male Date of Birth 1960       Chief Complaint   Patient presents with    Follow Up Wound Care Visit     LLE wound         Allergies:  Hydromorphone    Diagnosis:   Diagnosis ICD-10-CM Associated Orders   1  Non-pressure chronic ulcer of left calf with other specified severity ([facility])  L97 228 lidocaine (XYLOCAINE) 4 % topical solution 5 mL     Wound cleansing and dressings   2  Chronic venous hypertension (idiopathic) with ulcer and inflammation of left lower extremity (CODE) ([facility])  I87 332    3  Morbid obesity with BMI of 50 0-59 9, adult ([facility])  E66 01     Z68 43         Assessment and Plan :  · LLE wound progressively healing  · Chemically cauterized as below  · Continue Prophase soaks and resume with Polymem to wound bed and Calmoseptine to periwound, see wound orders below  · Continue Surepress for compression  · No harsh cleansers such as alcohol, peroxide, or antibacterial soap, do not submerge in water  · Continue frequent elevation of leg and increase exercise/walking  · Continue to consume protein and encouraged to loose weight    · Followup in 2 weeks or call sooner with questions or concerns       Subjective:   Patient presents for followup of LLE venous ulcer  No issues or complaints  Denies fevers or chills      The following portions of the patient's history were reviewed and updated as appropriate:   Patient Active Problem List   Diagnosis    Venous ulcer of left lower extremity with varicose veins ([facility])    Acute blood loss anemia    Bleeding from varicose vein    Essential hypertension    Chronic venous hypertension (idiopathic) with ulcer and inflammation of left lower extremity (CODE) ([facility]    Morbid obesity with BMI of 50 0-59 9, adult ([facility] )    Obstructive sleep apnea treated with continuous positive airway pressure (CPAP)     Past Medical History:   Diagnosis Date    Anxiety     CPAP (continuous positive airway pressure) dependence     Hypertension     Sleep apnea     Venous stasis ulcers of both lower extremities (HCC)      Past Surgical History:   Procedure Laterality Date    HERNIA REPAIR      umbilical    [place] ENDOVENOUS LASER, 1ST VEIN Left 8/12/2021    Procedure: ENDOVASCULAR LASER THERAPY (EVLT), Left GSV;  Surgeon: Cristy Devries MD;  Location: [facility];  Service: Vascular    TONSILLECTOMY       No family history on file  Social History     Socioeconomic History    Marital status: /Civil Union     Spouse name: Not on file    Number of children: Not on file    Years of education: Not on file    Highest education level: Not on file   Occupational History    Not on file   Tobacco Use    Smoking status: Never Smoker    Smokeless tobacco: Never Used   Vaping Use    Vaping Use: Never used   Substance and Sexual Activity    Alcohol use:  Yes     Alcohol/week: 3 0 standard drinks     Types: 2 Cans of beer, 1 Shots of liquor per week     Comment: daily    Drug use: Never    Sexual activity: Not on file   Other Topics Concern    Not on file   Social History Narrative    Not on file     Social Determinants of Health     Financial Resource Strain: Not on file   Food Insecurity: Not on file   Transportation Needs: Not on file   Physical Activity: Not on file   Stress: Not on file   Social Connections: Not on file   Intimate Partner Violence: Not on file   Housing Stability: Not on file       Current Outpatient Medications:     fexofenadine (ALLEGRA) 180 MG tablet, Take 1 tablet by mouth daily, Disp: , Rfl:     lisinopril-hydrochlorothiazide (PRINZIDE,ZESTORETIC) 20-12 5 MG per tablet, Take 2 tablets by mouth daily, Disp: 90 tablet, Rfl: 3    metoprolol succinate (TOPROL-XL) 100 mg 24 hr tablet, Take 1 tablet (100 mg total) by mouth daily, Disp: 90 tablet, Rfl: 5    naproxen sodium (ALEVE) 220 MG tablet, Take 1 tablet by mouth every 8 (eight) hours, Disp: , Rfl:     sertraline (ZOLOFT) 100 mg tablet, Take 1 tablet (100 mg total) by mouth daily, Disp: 90 tablet, Rfl: 5  No current facility-administered medications for this visit  Review of Systems   Constitutional: Negative for appetite change, chills, fatigue, fever and unexpected weight change  HENT: Negative for congestion, hearing loss and postnasal drip  Respiratory: Negative for cough and shortness of breath  Cardiovascular: Positive for leg swelling  Musculoskeletal: Positive for gait problem  Skin: Positive for wound (LLE )  Negative for rash  Neurological: Negative for numbness  Hematological: Does not bruise/bleed easily  Psychiatric/Behavioral: Negative  Objective:  /83   Pulse 77   Temp (!) 96 8 °F (36 °C)   Resp 18   Pain Score:   3     Physical Exam  Vitals reviewed  Constitutional:       General: He is not in acute distress  Appearance: Normal appearance  He is well-developed  He is morbidly obese  HENT:      Head: Normocephalic and atraumatic  Cardiovascular:      Rate and Rhythm: Normal rate  Pulmonary:      Effort: Pulmonary effort is normal    Musculoskeletal:         General: No deformity  Right lower leg: Edema present  Left lower leg: Edema present  Skin:     General: Skin is warm and dry  Findings: Wound (LLE) present  Comments: Beefy red wound bed with hypergranulated tissue and Biofilm  See wound assessment   Neurological:      General: No focal deficit present  Mental Status: He is alert and oriented to person, place, and time  Gait: Gait abnormal    Psychiatric:         Mood and Affect: Mood and affect normal          Behavior: Behavior normal  Behavior is cooperative  Chemical Caut Of A Wound     Date/Time 6/7/2022 1:44 PM     Performed by  Lara Martinez PA-C     Authorized by Lara Martinez PA-C       Associated wounds:   Wound 06/11/21 Venous Ulcer Pretibial Left   Universal Protocol   Consent: Verbal consent obtained  Written consent obtained    Risks and benefits: risks, benefits and alternatives were discussed  Consent given by: patient  Time out: Immediately prior to procedure a "time out" was called to verify the correct patient, procedure, equipment, support staff and site/side marked as required  Patient understanding: patient states understanding of the procedure being performed  Patient identity confirmed: verbally with patient        Local anesthesia used: yes     Anesthesia   Local anesthesia used: yes  Local Anesthetic: topical anesthetic     Sedation   Patient sedated: no        Specimen: no    Culture: no   Procedure Details   Procedure Notes: After permission and placement of topical local anesthetic, 3 Silver nitrate stick(s) were used to cauterize the hypergranular tissue of the open wound  Normal saline was used to neutralize the reaction  A dressing was applied, see orders  Patient tolerated procedure well  Patient tolerance: Patient tolerated the procedure well with no immediate complications                Wound Instructions:  Orders Placed This Encounter   Procedures    Wound cleansing and dressings     Wound cleansing and dressings   Left lower leg:     Wash your hands with soap and water  Remove old dressing, discard into plastic bag and place in trash  Cleanse the wound with a 10 minute prophase  soak prior to applying a clean dressing  Do not use tissue or cotton balls  Do not scrub the wound  Pat dry using gauze      Ok to shower on dressing change days       Apply a barrier cream around the wound, only apply to intact skin  Calmoseptine applied today at wound center or anything with zinc will protect the intact skin  Apply Polymem to the wound bed, cover with Optilock , rolled gauze and secure tape  Apply surepress for compression    Change dressing every day (wife to do dressing changes)If there is not a lot of drainage the dressing can be changed every other day                   Apply compression to your affected Leg(s) from mid-foot to knee making sure to cover the heel  Apply in the morning and re-wrap as needed during the day if wrap becomes loose  Remove at bedtime and elevate legs or lie down      Avoid prolonged standing in one place      Elevate leg(s) above the level of the heart when sitting or as much as possible        This was today at wound center     Standing Status:   Future     Standing Expiration Date:   6/7/2023       Constantino Joyner PA-C, [facility]    Portions of the record may have been created with voice recognition software  Occasional wrong word or "sound alike" substitutions may have occurred due to the inherent limitations of voice recognition software  Read the chart carefully and recognize, using context, where substitutions have occurred

## 2022-06-21 ENCOUNTER — OFFICE VISIT (OUTPATIENT)
Dept: WOUND CARE | Facility: CLINIC | Age: 62
End: 2022-06-21
Payer: COMMERCIAL

## 2022-06-21 VITALS
DIASTOLIC BLOOD PRESSURE: 81 MMHG | SYSTOLIC BLOOD PRESSURE: 147 MMHG | HEART RATE: 77 BPM | TEMPERATURE: 96.5 F | RESPIRATION RATE: 20 BRPM

## 2022-06-21 DIAGNOSIS — E66.01 MORBID OBESITY WITH BMI OF 50.0-59.9, ADULT (HCC): ICD-10-CM

## 2022-06-21 DIAGNOSIS — I87.332 CHRONIC VENOUS HYPERTENSION (IDIOPATHIC) WITH ULCER AND INFLAMMATION OF LEFT LOWER EXTREMITY (CODE) (HCC): ICD-10-CM

## 2022-06-21 DIAGNOSIS — L97.228 NON-PRESSURE CHRONIC ULCER OF LEFT CALF WITH OTHER SPECIFIED SEVERITY (HCC): Primary | ICD-10-CM

## 2022-06-21 PROCEDURE — 17250 CHEM CAUT OF GRANLTJ TISSUE: CPT | Performed by: ORTHOPAEDIC SURGERY

## 2022-06-21 RX ORDER — LIDOCAINE HYDROCHLORIDE 40 MG/ML
5 SOLUTION TOPICAL ONCE
Status: COMPLETED | OUTPATIENT
Start: 2022-06-21 | End: 2022-06-21

## 2022-06-21 RX ADMIN — LIDOCAINE HYDROCHLORIDE 5 ML: 40 SOLUTION TOPICAL at 09:10

## 2022-06-21 NOTE — PROGRESS NOTES
Patient ID: Kevon Ruth is a 62 y o  male Date of Birth 1960       Chief Complaint   Patient presents with    Follow Up Wound Care Visit     Left calf ulcer       Allergies:  Hydromorphone    Diagnosis:   Diagnosis ICD-10-CM Associated Orders   1  Non-pressure chronic ulcer of left calf with other specified severity ([facility])  L97 228 lidocaine (XYLOCAINE) 4 % topical solution 5 mL     Wound cleansing and dressings   2  Chronic venous hypertension (idiopathic) with ulcer and inflammation of left lower extremity (CODE) ([facility])  I87 332    3  Morbid obesity with BMI of 50 0-59 9, adult ([place] [address] )  E66 01     Z68 43         Assessment and Plan :  · LLE wound continues to improve  · Chemically cauterized as below  · Continue Prophase soaks and Polymem to wound bed and Calmoseptine to periwound, see wound orders below  · Continue Surepress for compression  · No harsh cleansers such as alcohol, peroxide, or antibacterial soap, do not submerge in water  · Continue frequent elevation of leg and increase exercise/walking  · Continue to consume protein and encouraged to loose weight    · Followup in 3 weeks or call sooner with questions or concerns        Subjective:   Patient presents for followup of LLE venous ulcer  No issues or complaints  Has been applying Polymem to wound bed  Denies fevers or chills          The following portions of the patient's history were reviewed and updated as appropriate:   Patient Active Problem List   Diagnosis    Venous ulcer of left lower extremity with varicose veins ([facility])    Acute blood loss anemia    Bleeding from varicose vein    Essential hypertension    Chronic venous hypertension (idiopathic) with ulcer and inflammation of left lower extremity (CODE) ([place] [address] )    Morbid obesity with BMI of 50 0-59 9, adult ([place] [address] )    Obstructive sleep apnea treated with continuous positive airway pressure (CPAP)     Past Medical History:   Diagnosis Date    Anxiety     CPAP (continuous positive airway pressure) dependence     Hypertension     Sleep apnea     Venous stasis ulcers of both lower extremities (HCC)      Past Surgical History:   Procedure Laterality Date    HERNIA REPAIR      umbilical    [place] ENDOVENOUS LASER, 1ST VEIN Left 8/12/2021    Procedure: ENDOVASCULAR LASER THERAPY (EVLT), Left GSV;  Surgeon: Andriy Khan MD;  Location: [facility];  Service: Vascular    TONSILLECTOMY       No family history on file  Social History     Socioeconomic History    Marital status: /Civil Union     Spouse name: None    Number of children: None    Years of education: None    Highest education level: None   Occupational History    None   Tobacco Use    Smoking status: Never Smoker    Smokeless tobacco: Never Used   Vaping Use    Vaping Use: Never used   Substance and Sexual Activity    Alcohol use:  Yes     Alcohol/week: 3 0 standard drinks     Types: 2 Cans of beer, 1 Shots of liquor per week     Comment: daily    Drug use: Never    Sexual activity: None   Other Topics Concern    None   Social History Narrative    None     Social Determinants of Health     Financial Resource Strain: Not on file   Food Insecurity: Not on file   Transportation Needs: Not on file   Physical Activity: Not on file   Stress: Not on file   Social Connections: Not on file   Intimate Partner Violence: Not on file   Housing Stability: Not on file       Current Outpatient Medications:     fexofenadine (ALLEGRA) 180 MG tablet, Take 1 tablet by mouth daily, Disp: , Rfl:     lisinopril-hydrochlorothiazide (PRINZIDE,ZESTORETIC) 20-12 5 MG per tablet, Take 2 tablets by mouth daily, Disp: 90 tablet, Rfl: 3    metoprolol succinate (TOPROL-XL) 100 mg 24 hr tablet, Take 1 tablet (100 mg total) by mouth daily, Disp: 90 tablet, Rfl: 5    naproxen sodium (ALEVE) 220 MG tablet, Take 1 tablet by mouth every 8 (eight) hours, Disp: , Rfl:     sertraline (ZOLOFT) 100 mg tablet, Take 1 tablet (100 mg total) by mouth daily, Disp: 90 tablet, Rfl: 5  No current facility-administered medications for this visit  Review of Systems   Constitutional: Negative for appetite change, chills, fatigue, fever and unexpected weight change  HENT: Negative for congestion, hearing loss and postnasal drip  Respiratory: Negative for cough and shortness of breath  Cardiovascular: Positive for leg swelling  Musculoskeletal: Positive for gait problem  Skin: Positive for wound (LLE )  Negative for rash  Neurological: Negative for numbness  Hematological: Does not bruise/bleed easily  Psychiatric/Behavioral: Negative  Objective:  /81   Pulse 77   Temp (!) 96 5 °F (35 8 °C)   Resp 20   Pain Score: 0-No pain     Physical Exam  Vitals reviewed  Constitutional:       General: He is not in acute distress  Appearance: Normal appearance  He is well-developed  He is morbidly obese  HENT:      Head: Normocephalic and atraumatic  Cardiovascular:      Rate and Rhythm: Normal rate  Pulmonary:      Effort: Pulmonary effort is normal    Musculoskeletal:         General: No deformity  Right lower leg: Edema present  Left lower leg: Edema present  Skin:     General: Skin is warm and dry  Findings: Wound (LLE) present  Comments: Beefy red wound bed with hypergranulated tissue and Biofilm  See wound assessment   Neurological:      General: No focal deficit present  Mental Status: He is alert and oriented to person, place, and time  Gait: Gait abnormal    Psychiatric:         Mood and Affect: Mood and affect normal          Behavior: Behavior normal  Behavior is cooperative  Wound 06/11/21 Venous Ulcer Pretibial Left (Active)   Wound Image Images linked 06/21/22 0907   Wound Description Beefy red;Epithelialization; Hypergranulation 06/21/22 0907   Hui-wound Assessment Fragile; Hyperpigmented 06/21/22 0907   Wound Length (cm) 4 cm 06/21/22 0907   Wound Width (cm) 10 3 cm 06/21/22 0907   Wound Depth (cm) 0 1 cm 06/21/22 0907   Wound Surface Area (cm^2) 41 2 cm^2 06/21/22 0907   Wound Volume (cm^3) 4 12 cm^3 06/21/22 0907   Calculated Wound Volume (cm^3) 4 12 cm^3 06/21/22 0907   Change in Wound Size % 96 26 06/21/22 0907   Drainage Amount Small 06/21/22 0907   Drainage Description Serosanguineous 06/21/22 0907   Non-staged Wound Description Full thickness 06/21/22 0907   Dressing Status Intact 06/21/22 0907             Chemical Caut Of A Wound     Date/Time 6/21/2022 9:35 AM     Performed by  Mindy Jordan PA-C     Authorized by Mindy Jordan PA-C       Associated wounds:   Wound 06/11/21 Venous Ulcer Pretibial Left   Universal Protocol   Consent: Verbal consent obtained  Written consent obtained  Risks and benefits: risks, benefits and alternatives were discussed  Consent given by: patient  Time out: Immediately prior to procedure a "time out" was called to verify the correct patient, procedure, equipment, support staff and site/side marked as required  Patient understanding: patient states understanding of the procedure being performed  Patient identity confirmed: verbally with patient        Local anesthesia used: yes     Anesthesia   Local anesthesia used: yes  Local Anesthetic: topical anesthetic     Sedation   Patient sedated: no        Specimen: no    Culture: no   Procedure Details   Procedure Notes: After permission and placement of topical local anesthetic, 2 Silver nitrate stick(s) were used to cauterize the hypergranular tissue of the open wound  Normal saline was used to neutralize the reaction  A dressing was applied, see orders  Patient tolerated procedure well        Patient tolerance: Patient tolerated the procedure well with no immediate complications                      Wound Instructions:  Orders Placed This Encounter   Procedures    Wound cleansing and dressings     Wound cleansing and dressings       Wound cleansing and dressings          Left lower leg:     Wash your hands with soap and water  Remove old dressing, discard into plastic bag and place in trash  Cleanse the wound with a 10 minute prophase  soak prior to applying a clean dressing  Do not use tissue or cotton balls  Do not scrub the wound  Pat dry using gauze      Ok to shower on dressing change days       Apply a barrier cream around the wound, only apply to intact skin  Calmoseptine applied today at wound center or anything with zinc will protect the intact skin  Apply slited Polymem to the wound bed, cover with Optilock , rolled gauze and secure tape  Apply surepress for compression  Change dressing every day (wife to do dressing changes)If there is not a lot of drainage the dressing can be changed every other day                   Apply compression to your affected Leg(s) from mid-foot to knee making sure to cover the heel  Apply in the morning and re-wrap as needed during the day if wrap becomes loose  Remove at bedtime and elevate legs or lie down      Avoid prolonged standing in one place      Elevate leg(s) above the level of the heart when sitting or as much as possible        This was today at wound center     Standing Status:   Future     Standing Expiration Date:   6/21/2023       Constantino Joyner PA-C, [facility] [place]      Portions of the record may have been created with voice recognition software  Occasional wrong word or "sound alike" substitutions may have occurred due to the inherent limitations of voice recognition software  Read the chart carefully and recognize, using context, where substitutions have occurred

## 2022-06-21 NOTE — PATIENT INSTRUCTIONS
Orders Placed This Encounter   Procedures    Wound cleansing and dressings     Wound cleansing and dressings       Wound cleansing and dressings          Left lower leg:     Wash your hands with soap and water  Remove old dressing, discard into plastic bag and place in trash  Cleanse the wound with a 10 minute prophase  soak prior to applying a clean dressing  Do not use tissue or cotton balls  Do not scrub the wound  Pat dry using gauze  Ok to shower on dressing change days  Apply a barrier cream around the wound, only apply to intact skin  Calmoseptine applied today at wound center or anything with zinc will protect the intact skin  Apply slited Polymem to the wound bed, cover with Optilock , rolled gauze and secure tape  Apply surepress for compression  Change dressing every day (wife to do dressing changes)If there is not a lot of drainage the dressing can be changed every other day  Apply compression to your affected Leg(s) from mid-foot to knee making sure to cover the heel  Apply in the morning and re-wrap as needed during the day if wrap becomes loose  Remove at bedtime and elevate legs or lie down  Avoid prolonged standing in one place       Elevate leg(s) above the level of the heart when sitting or as much as possible        This was today at wound center     Standing Status:   Future     Standing Expiration Date:   6/21/2023

## 2022-07-12 ENCOUNTER — OFFICE VISIT (OUTPATIENT)
Dept: WOUND CARE | Facility: CLINIC | Age: 62
End: 2022-07-12
Payer: COMMERCIAL

## 2022-07-12 VITALS
DIASTOLIC BLOOD PRESSURE: 74 MMHG | TEMPERATURE: 97.5 F | SYSTOLIC BLOOD PRESSURE: 146 MMHG | HEART RATE: 76 BPM | RESPIRATION RATE: 16 BRPM

## 2022-07-12 DIAGNOSIS — I87.332 CHRONIC VENOUS HYPERTENSION (IDIOPATHIC) WITH ULCER AND INFLAMMATION OF LEFT LOWER EXTREMITY (CODE) (HCC): ICD-10-CM

## 2022-07-12 DIAGNOSIS — L97.228 NON-PRESSURE CHRONIC ULCER OF LEFT CALF WITH OTHER SPECIFIED SEVERITY (HCC): Primary | ICD-10-CM

## 2022-07-12 DIAGNOSIS — E66.01 MORBID OBESITY WITH BMI OF 50.0-59.9, ADULT (HCC): ICD-10-CM

## 2022-07-12 PROCEDURE — 17250 CHEM CAUT OF GRANLTJ TISSUE: CPT | Performed by: ORTHOPAEDIC SURGERY

## 2022-07-12 RX ORDER — LIDOCAINE HYDROCHLORIDE 40 MG/ML
5 SOLUTION TOPICAL ONCE
Status: COMPLETED | OUTPATIENT
Start: 2022-07-12 | End: 2022-07-12

## 2022-07-12 RX ADMIN — LIDOCAINE HYDROCHLORIDE 5 ML: 40 SOLUTION TOPICAL at 08:59

## 2022-07-12 NOTE — PROGRESS NOTES
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 severity ([facility])  L97 228 lidocaine (XYLOCAINE) 4 % topical solution 5 mL     Wound cleansing and dressings     Wound compression and edema control   2  Chronic venous hypertension (idiopathic) with ulcer and inflammation of left lower extremity (CODE) ([facility])  I87 332 lidocaine (XYLOCAINE) 4 % topical solution 5 mL     Wound cleansing and dressings     Wound compression and edema control   3  Morbid obesity with BMI of 50 0-59 9, adult ([facility] )  E67 01     Z68 43         Assessment and Plan :  · LLE wound continues to improve on Polymem  · Chemically cauterized as below  · Continue Polymem to wound bed and Calmoseptine to periwound, see wound orders below  · Continue Surepress for compression  · No harsh cleansers such as alcohol, peroxide, or antibacterial soap, do not submerge in water  · Continue frequent elevation of leg and increase exercise/walking  · Continue to consume protein and encouraged to loose weight    · Encouraged to f/u with vascular surgery for 1 year f/u appointment  · Followup in 3 weeks or call sooner with questions or concerns    Subjective:   Patient presents for followup of LLE venous ulcer  No issues or complaints  Has been applying Polymem to wound bed  Pt had a LLE Endovascular Laser Therapy (ablation) on 8/12/21 by Dr Oswaldo Barrera with good result           The following portions of the patient's history were reviewed and updated as appropriate:   Patient Active Problem List   Diagnosis    Venous ulcer of left lower extremity with varicose veins (HCC)    Acute blood loss anemia    Bleeding from varicose vein    Essential hypertension    Chronic venous hypertension (idiopathic) with ulcer and inflammation of left lower extremity (CODE) (HCC)    Morbid obesity with BMI of 50 0-59 9, adult (HCC)    Obstructive sleep apnea treated with continuous positive airway pressure (CPAP)     Past Medical History:   Diagnosis Date    Anxiety     CPAP (continuous positive airway pressure) dependence     Hypertension     Sleep apnea     Venous stasis ulcers of both lower extremities (HCC)      Past Surgical History:   Procedure Laterality Date    HERNIA REPAIR      umbilical    [place] ENDOVENOUS LASER, 1ST VEIN Left 8/12/2021    Procedure: ENDOVASCULAR LASER THERAPY (EVLT), Left GSV;  Surgeon: Mindy Cortze MD;  Location: [facility] OR;  Service: Vascular    TONSILLECTOMY       No family history on file  Social History     Socioeconomic History    Marital status: /Civil Union     Spouse name: Not on file    Number of children: Not on file    Years of education: Not on file    Highest education level: Not on file   Occupational History    Not on file   Tobacco Use    Smoking status: Never Smoker    Smokeless tobacco: Never Used   Vaping Use    Vaping Use: Never used   Substance and Sexual Activity    Alcohol use:  Yes     Alcohol/week: 3 0 standard drinks     Types: 2 Cans of beer, 1 Shots of liquor per week     Comment: daily    Drug use: Never    Sexual activity: Not on file   Other Topics Concern    Not on file   Social History Narrative    Not on file     Social Determinants of Health     Financial Resource Strain: Not on file   Food Insecurity: Not on file   Transportation Needs: Not on file   Physical Activity: Not on file   Stress: Not on file   Social Connections: Not on file   Intimate Partner Violence: Not on file   Housing Stability: Not on file       Current Outpatient Medications:     fexofenadine (ALLEGRA) 180 MG tablet, Take 1 tablet by mouth daily, Disp: , Rfl:     lisinopril-hydrochlorothiazide (PRINZIDE,ZESTORETIC) 20-12 5 MG per tablet, Take 2 tablets by mouth daily, Disp: 90 tablet, Rfl: 3    metoprolol succinate (TOPROL-XL) 100 mg 24 hr tablet, Take 1 tablet (100 mg total) by mouth daily, Disp: 90 tablet, Rfl: 5    naproxen sodium (ALEVE) 220 MG tablet, Take 1 tablet by mouth every 8 (eight) hours, Disp: , Rfl:     sertraline (ZOLOFT) 100 mg tablet, Take 1 tablet (100 mg total) by mouth daily, Disp: 90 tablet, Rfl: 5  No current facility-administered medications for this visit  Review of Systems   Constitutional: Negative for appetite change, chills, fatigue, fever and unexpected weight change  HENT: Negative for congestion, hearing loss and postnasal drip  Respiratory: Negative for cough and shortness of breath  Cardiovascular: Positive for leg swelling  Musculoskeletal: Positive for gait problem  Skin: Positive for wound (LLE )  Negative for rash  Neurological: Negative for numbness  Hematological: Does not bruise/bleed easily  Psychiatric/Behavioral: Negative  Objective:  /74   Pulse 76   Temp 97 5 °F (36 4 °C)   Resp 16   Pain Score: 0-No pain     Physical Exam  Vitals reviewed  Constitutional:       General: He is not in acute distress  Appearance: Normal appearance  He is well-developed  He is morbidly obese  HENT:      Head: Normocephalic and atraumatic  Cardiovascular:      Rate and Rhythm: Normal rate  Pulmonary:      Effort: Pulmonary effort is normal    Musculoskeletal:         General: No deformity  Right lower leg: Edema present  Left lower leg: Edema present  Skin:     General: Skin is warm and dry  Findings: Wound (LLE) present  Comments: Beefy red wound bed with hypergranulated tissue  See wound assessment   Neurological:      General: No focal deficit present  Mental Status: He is alert and oriented to person, place, and time        Gait: Gait abnormal    Psychiatric:         Mood and Affect: Mood and affect normal          Behavior: Behavior normal  Behavior is cooperative  Wound 06/11/21 Venous Ulcer Pretibial Left (Active)   Wound Image Images linked 07/12/22 0858   Wound Description Beefy red;Epithelialization; Hypergranulation 07/12/22 0858   Hui-wound Assessment Fragile; Hyperpigmented;Edema 07/12/22 0858   Wound Length (cm) 3 7 cm 07/12/22 0858   Wound Width (cm) 9 8 cm 07/12/22 0858   Wound Depth (cm) 0 1 cm 07/12/22 0858   Wound Surface Area (cm^2) 36 26 cm^2 07/12/22 0858   Wound Volume (cm^3) 3 626 cm^3 07/12/22 0858   Calculated Wound Volume (cm^3) 3 63 cm^3 07/12/22 0858   Change in Wound Size % 96 7 07/12/22 0858   Drainage Amount Small 07/12/22 0858   Drainage Description Serosanguineous 07/12/22 0858   Non-staged Wound Description Full thickness 07/12/22 0858   Wound packed? No 07/12/22 0858   Dressing Status Intact 07/12/22 0858           Chemical Caut Of A Wound     Date/Time 7/12/2022 9:25 AM     Performed by  Naveen Renner PA-C     Authorized by Naveen Renner PA-C       Associated wounds:   Wound 06/11/21 Venous Ulcer Pretibial Left   Universal Protocol   Consent: Verbal consent obtained  Written consent obtained  Risks and benefits: risks, benefits and alternatives were discussed  Consent given by: patient  Time out: Immediately prior to procedure a "time out" was called to verify the correct patient, procedure, equipment, support staff and site/side marked as required  Patient understanding: patient states understanding of the procedure being performed  Patient identity confirmed: verbally with patient        Local anesthesia used: yes     Anesthesia   Local anesthesia used: yes  Local Anesthetic: topical anesthetic     Sedation   Patient sedated: no        Specimen: no    Culture: no   Procedure Details   Procedure Notes: After permission and placement of topical local anesthetic, 1 Silver nitrate stick(s) were used to cauterize the hypergranular tissue of the open wound  Normal saline was used to neutralize the reaction   A dressing was applied, see orders  Patient tolerated procedure well  Patient tolerance: Patient tolerated the procedure well with no immediate complications                Wound Instructions:  Orders Placed This Encounter   Procedures    Wound cleansing and dressings     Wash your hands with soap and water  Remove old dressing, discard into plastic bag and place in trash  Cleanse the wound with soap and water prior to applying a clean dressing  Do not use tissue or cotton balls  Do not scrub the wound  Pat dry using gauze      Ok to shower on dressing change days       Apply a barrier cream around the wound, only apply to intact skin  Calmoseptine applied today at wound center or anything with zinc will protect the intact skin  Apply slited Polymem to the wound bed, cover with ABD , rolled gauze and secure tape  Apply surepress for compression  Change dressing every other day (wife to do dressing changes)                 Apply compression to your affected Leg(s) from mid-foot to knee making sure to cover the heel  Apply in the morning and re-wrap as needed during the day if wrap becomes loose  Remove at bedtime and elevate legs or lie down      Avoid prolonged standing in one place      Elevate leg(s) above the level of the heart when sitting or as much as possible     Standing Status:   Future     Standing Expiration Date:   7/12/2023    Wound compression and edema control     Surepress - Left leg  Apply compression wrap to your affected Leg(s) from mid-foot to knee making sure to cover the heel  Apply in the morning and re-wrap as needed during the day if wrap becomes loose  Remove at bedtime and elevate legs or lie down  Avoid prolonged standing in one place  Elevate leg(s) above the level of the heart when sitting or as much as possible       Standing Status:   Future     Standing Expiration Date:   7/12/2023       Cayla Soria PA-C, [facility]      Portions of the record may have been created with voice recognition software  Occasional wrong word or "sound alike" substitutions may have occurred due to the inherent limitations of voice recognition software  Read the chart carefully and recognize, using context, where substitutions have occurred

## 2022-07-12 NOTE — PATIENT INSTRUCTIONS
Orders Placed This Encounter   Procedures    Wound cleansing and dressings     Wash your hands with soap and water  Remove old dressing, discard into plastic bag and place in trash  Cleanse the wound with soap and water prior to applying a clean dressing  Do not use tissue or cotton balls  Do not scrub the wound  Pat dry using gauze  Ok to shower on dressing change days  Apply a barrier cream around the wound, only apply to intact skin  Calmoseptine applied today at wound center or anything with zinc will protect the intact skin  Apply slited Polymem to the wound bed, cover with ABD , rolled gauze and secure tape  Apply surepress for compression  Change dressing every other day (wife to do dressing changes)  Apply compression to your affected Leg(s) from mid-foot to knee making sure to cover the heel  Apply in the morning and re-wrap as needed during the day if wrap becomes loose  Remove at bedtime and elevate legs or lie down  Avoid prolonged standing in one place  Elevate leg(s) above the level of the heart when sitting or as much as possible     Standing Status:   Future     Standing Expiration Date:   7/12/2023    Wound compression and edema control     Surepress - Left leg  Apply compression wrap to your affected Leg(s) from mid-foot to knee making sure to cover the heel  Apply in the morning and re-wrap as needed during the day if wrap becomes loose  Remove at bedtime and elevate legs or lie down  Avoid prolonged standing in one place  Elevate leg(s) above the level of the heart when sitting or as much as possible       Standing Status:   Future     Standing Expiration Date:   7/12/2023

## 2022-07-26 ENCOUNTER — OFFICE VISIT (OUTPATIENT)
Dept: WOUND CARE | Facility: CLINIC | Age: 62
End: 2022-07-26
Payer: COMMERCIAL

## 2022-07-26 VITALS
TEMPERATURE: 96.6 F | DIASTOLIC BLOOD PRESSURE: 76 MMHG | SYSTOLIC BLOOD PRESSURE: 135 MMHG | RESPIRATION RATE: 20 BRPM | HEART RATE: 100 BPM

## 2022-07-26 DIAGNOSIS — I87.332 CHRONIC VENOUS HYPERTENSION (IDIOPATHIC) WITH ULCER AND INFLAMMATION OF LEFT LOWER EXTREMITY (CODE) (HCC): ICD-10-CM

## 2022-07-26 DIAGNOSIS — L97.228 NON-PRESSURE CHRONIC ULCER OF LEFT CALF WITH OTHER SPECIFIED SEVERITY (HCC): Primary | ICD-10-CM

## 2022-07-26 DIAGNOSIS — E66.01 MORBID OBESITY WITH BMI OF 50.0-59.9, ADULT (HCC): ICD-10-CM

## 2022-07-26 PROCEDURE — 17250 CHEM CAUT OF GRANLTJ TISSUE: CPT | Performed by: ORTHOPAEDIC SURGERY

## 2022-07-26 RX ORDER — LIDOCAINE HYDROCHLORIDE 40 MG/ML
5 SOLUTION TOPICAL ONCE
Status: COMPLETED | OUTPATIENT
Start: 2022-07-26 | End: 2022-07-26

## 2022-07-26 RX ADMIN — LIDOCAINE HYDROCHLORIDE 5 ML: 40 SOLUTION TOPICAL at 09:00

## 2022-07-26 NOTE — PATIENT INSTRUCTIONS
Orders Placed This Encounter   Procedures    Wound cleansing and dressings     Wound cleansing and dressings       Wash your hands with soap and water  Remove old dressing, discard into plastic bag and place in trash  Cleanse the wound with soap and water prior to applying a clean dressing  Do not use tissue or cotton balls  Do not scrub the wound  Pat dry using gauze  Ok to shower on dressing change days  Apply a barrier cream around the wound, only apply to intact skin  Calmoseptine applied today at wound center or anything with zinc will protect the intact skin  Apply slited Polymem to the wound bed, cover with ABD , rolled gauze and secure tape  Apply surepress for compression  Change dressing every other day (wife to do dressing changes)  Apply compression to your affected Leg(s) from mid-foot to knee making sure to cover the heel  Apply in the morning and re-wrap as needed during the day if wrap becomes loose  Remove at bedtime and elevate legs or lie down  Avoid prolonged standing in one place  Elevate leg(s) above the level of the heart when sitting or as much as possible    Wound compression and edema control      Surepress - Left leg  Apply compression wrap to your affected Leg(s) from mid-foot to knee making sure to cover the heel  Apply in the morning and re-wrap as needed during the day if wrap becomes loose  Remove at bedtime and elevate legs or lie down  Avoid prolonged standing in one place  Elevate leg(s) above the level of the heart when sitting or as much as possible  This was done today in the wound center       Standing Status:   Future     Standing Expiration Date:   7/26/2023

## 2022-07-26 NOTE — PROGRESS NOTES
Patient ID: Regine Pisano is a 62 y o  male Date of Birth 1960       Chief Complaint   Patient presents with    Follow Up Wound Care Visit     Left leg wound       Allergies:  Hydromorphone    Diagnosis:   Diagnosis ICD-10-CM Associated Orders   1  Non-pressure chronic ulcer of left calf with other specified severity ([facility])  L97 228 lidocaine (XYLOCAINE) 4 % topical solution 5 mL     Wound cleansing and dressings   2  Chronic venous hypertension (idiopathic) with ulcer and inflammation of left lower extremity (CODE) ([facility])  I87 332 lidocaine (XYLOCAINE) 4 % topical solution 5 mL     Wound cleansing and dressings   3  Morbid obesity with BMI of 50 0-59 9, adult ([place] [address] )  E66 01     Z68 43         Assessment and Plan :  · LLE wound progressively healing  · Chemically cauterized as below  · Continue Polymem to wound bed and Calmoseptine to periwound, see wound orders below  · Continue Surepress for compression  · No harsh cleansers such as alcohol, peroxide, or antibacterial soap, do not submerge in water  · Continue frequent elevation of leg and increase exercise/walking  · Continue to consume protein and encouraged to loose weight    · Encouraged to f/u with vascular surgery for 1 year f/u appointment  · Followup in 3 weeks or call sooner with questions or concerns      Subjective:   Patient presents for followup of LLE venous ulcer  No issues or complaints  Has been applying Polymem to wound bed  Denies fevers or chills           The following portions of the patient's history were reviewed and updated as appropriate:   Patient Active Problem List   Diagnosis    Venous ulcer of left lower extremity with varicose veins ([facility])    Acute blood loss anemia    Bleeding from varicose vein    Essential hypertension    Chronic venous hypertension (idiopathic) with ulcer and inflammation of left lower extremity (CODE) ([place] [address] )    Morbid obesity with BMI of 50 0-59 9, adult ([place] [address] )    Obstructive sleep apnea treated with continuous positive airway pressure (CPAP)     Past Medical History:   Diagnosis Date    Anxiety     CPAP (continuous positive airway pressure) dependence     Hypertension     Sleep apnea     Venous stasis ulcers of both lower extremities (HCC)      Past Surgical History:   Procedure Laterality Date    HERNIA REPAIR      umbilical    [place] ENDOVENOUS LASER, 1ST VEIN Left 8/12/2021    Procedure: ENDOVASCULAR LASER THERAPY (EVLT), Left GSV;  Surgeon: Dale Liz MD;  Location: [facility];  Service: Vascular    TONSILLECTOMY       No family history on file  Social History     Socioeconomic History    Marital status: /Civil Union     Spouse name: None    Number of children: None    Years of education: None    Highest education level: None   Occupational History    None   Tobacco Use    Smoking status: Never Smoker    Smokeless tobacco: Never Used   Vaping Use    Vaping Use: Never used   Substance and Sexual Activity    Alcohol use:  Yes     Alcohol/week: 3 0 standard drinks     Types: 2 Cans of beer, 1 Shots of liquor per week     Comment: daily    Drug use: Never    Sexual activity: None   Other Topics Concern    None   Social History Narrative    None     Social Determinants of Health     Financial Resource Strain: Not on file   Food Insecurity: Not on file   Transportation Needs: Not on file   Physical Activity: Not on file   Stress: Not on file   Social Connections: Not on file   Intimate Partner Violence: Not on file   Housing Stability: Not on file       Current Outpatient Medications:     fexofenadine (ALLEGRA) 180 MG tablet, Take 1 tablet by mouth daily, Disp: , Rfl:     lisinopril-hydrochlorothiazide (PRINZIDE,ZESTORETIC) 20-12 5 MG per tablet, Take 2 tablets by mouth daily, Disp: 90 tablet, Rfl: 3    metoprolol succinate (TOPROL-XL) 100 mg 24 hr tablet, Take 1 tablet (100 mg total) by mouth daily, Disp: 90 tablet, Rfl: 5    naproxen sodium (ALEVE) 220 MG tablet, Take 1 tablet by mouth every 8 (eight) hours, Disp: , Rfl:     sertraline (ZOLOFT) 100 mg tablet, Take 1 tablet (100 mg total) by mouth daily, Disp: 90 tablet, Rfl: 5  No current facility-administered medications for this visit  Review of Systems   Constitutional: Negative for appetite change, chills, fatigue, fever and unexpected weight change  HENT: Negative for congestion, hearing loss and postnasal drip  Respiratory: Negative for cough and shortness of breath  Cardiovascular: Positive for leg swelling  Musculoskeletal: Positive for gait problem  Skin: Positive for wound (LLE )  Negative for rash  Neurological: Negative for numbness  Hematological: Does not bruise/bleed easily  Psychiatric/Behavioral: Negative  Objective:  /76   Pulse 100   Temp (!) 96 6 °F (35 9 °C)   Resp 20   Pain Score: 0-No pain     Physical Exam  Vitals reviewed  Constitutional:       General: He is not in acute distress  Appearance: Normal appearance  He is well-developed  He is morbidly obese  HENT:      Head: Normocephalic and atraumatic  Cardiovascular:      Rate and Rhythm: Normal rate  Pulmonary:      Effort: Pulmonary effort is normal    Musculoskeletal:         General: No deformity  Right lower leg: Edema present  Left lower leg: Edema present  Skin:     General: Skin is warm and dry  Findings: Wound (LLE) present  Comments: Beefy red wound bed with hypergranulated tissue  See wound assessment   Neurological:      General: No focal deficit present  Mental Status: He is alert and oriented to person, place, and time  Gait: Gait abnormal    Psychiatric:         Mood and Affect: Mood and affect normal          Behavior: Behavior normal  Behavior is cooperative  Wound 06/11/21 Venous Ulcer Pretibial Left (Active)   Wound Image   07/26/22 0800   Wound Description Beefy red;Epithelialization; Hypergranulation 07/26/22 0800   Hui-wound Assessment Fragile; Hyperpigmented;Edema 07/26/22 0800   Wound Length (cm) 3 5 cm 07/26/22 0800   Wound Width (cm) 9 cm 07/26/22 0800   Wound Depth (cm) 0 1 cm 07/26/22 0800   Wound Surface Area (cm^2) 31 5 cm^2 07/26/22 0800   Wound Volume (cm^3) 3 15 cm^3 07/26/22 0800   Calculated Wound Volume (cm^3) 3 15 cm^3 07/26/22 0800   Change in Wound Size % 97 14 07/26/22 0800   Drainage Amount Small 07/26/22 0800   Drainage Description Serosanguineous 07/26/22 0800   Non-staged Wound Description Full thickness 07/26/22 0800   Treatments Irrigation with NSS 06/07/22 0851   Wound packed? No 07/12/22 0858   Dressing Status Intact 07/12/22 0858              Chemical Caut Of A Wound     Date/Time 7/26/2022 9:55 AM     Performed by  Eren Hernandez PA-C     Authorized by Eren Hernandez PA-C       Associated wounds:   Wound 06/11/21 Venous Ulcer Pretibial Left   Universal Protocol   Consent: Verbal consent obtained  Written consent obtained  Risks and benefits: risks, benefits and alternatives were discussed  Consent given by: patient  Time out: Immediately prior to procedure a "time out" was called to verify the correct patient, procedure, equipment, support staff and site/side marked as required  Patient understanding: patient states understanding of the procedure being performed  Patient identity confirmed: verbally with patient        Local anesthesia used: yes     Anesthesia   Local anesthesia used: yes  Local Anesthetic: topical anesthetic     Sedation   Patient sedated: no        Specimen: no    Culture: no   Procedure Details   Procedure Notes: After permission and placement of topical local anesthetic, 1 Silver nitrate stick(s) were used to cauterize the hypergranular tissue of the open wound  Normal saline was used to neutralize the reaction  A dressing was applied, see orders  Patient tolerated procedure well        Patient tolerance: Patient tolerated the procedure well with no immediate complications Wound Instructions:  Orders Placed This Encounter   Procedures    Wound cleansing and dressings     Wound cleansing and dressings       Wash your hands with soap and water  Remove old dressing, discard into plastic bag and place in trash  Cleanse the wound with soap and water prior to applying a clean dressing  Do not use tissue or cotton balls  Do not scrub the wound  Pat dry using gauze      Ok to shower on dressing change days       Apply a barrier cream around the wound, only apply to intact skin  Calmoseptine applied today at wound center or anything with zinc will protect the intact skin  Apply slited Polymem to the wound bed, cover with ABD , rolled gauze and secure tape  Apply surepress for compression  Change dressing every other day (wife to do dressing changes)                 Apply compression to your affected Leg(s) from mid-foot to knee making sure to cover the heel  Apply in the morning and re-wrap as needed during the day if wrap becomes loose  Remove at bedtime and elevate legs or lie down      Avoid prolonged standing in one place      Elevate leg(s) above the level of the heart when sitting or as much as possible    Wound compression and edema control      Surepress - Left leg  Apply compression wrap to your affected Leg(s) from mid-foot to knee making sure to cover the heel  Apply in the morning and re-wrap as needed during the day if wrap becomes loose  Remove at bedtime and elevate legs or lie down      Avoid prolonged standing in one place      Elevate leg(s) above the level of the heart when sitting or as much as possible  This was done today in the wound center  Standing Status:   Future     Standing Expiration Date:   7/26/2023       Chaya Stern PA-C, [facility]      Portions of the record may have been created with voice recognition software   Occasional wrong word or "sound alike" substitutions may have occurred due to the inherent limitations of voice recognition software  Read the chart carefully and recognize, using context, where substitutions have occurred

## 2022-08-16 ENCOUNTER — OFFICE VISIT (OUTPATIENT)
Dept: WOUND CARE | Facility: CLINIC | Age: 62
End: 2022-08-16
Payer: COMMERCIAL

## 2022-08-16 VITALS
HEART RATE: 79 BPM | TEMPERATURE: 97.8 F | DIASTOLIC BLOOD PRESSURE: 81 MMHG | RESPIRATION RATE: 18 BRPM | SYSTOLIC BLOOD PRESSURE: 149 MMHG

## 2022-08-16 DIAGNOSIS — L97.228 NON-PRESSURE CHRONIC ULCER OF LEFT CALF WITH OTHER SPECIFIED SEVERITY (HCC): Primary | ICD-10-CM

## 2022-08-16 DIAGNOSIS — E66.01 MORBID OBESITY WITH BMI OF 50.0-59.9, ADULT (HCC): ICD-10-CM

## 2022-08-16 DIAGNOSIS — I87.332 CHRONIC VENOUS HYPERTENSION (IDIOPATHIC) WITH ULCER AND INFLAMMATION OF LEFT LOWER EXTREMITY (CODE) (HCC): ICD-10-CM

## 2022-08-16 PROCEDURE — 17250 CHEM CAUT OF GRANLTJ TISSUE: CPT | Performed by: ORTHOPAEDIC SURGERY

## 2022-08-16 RX ORDER — LIDOCAINE 40 MG/G
CREAM TOPICAL ONCE
Status: COMPLETED | OUTPATIENT
Start: 2022-08-16 | End: 2022-08-16

## 2022-08-16 RX ADMIN — LIDOCAINE: 40 CREAM TOPICAL at 08:51

## 2022-08-16 NOTE — PROGRESS NOTES
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 ([facility])  L97 228 lidocaine (LMX) 4 % cream     Wound cleansing and dressings   2  Chronic venous hypertension (idiopathic) with ulcer and inflammation of left lower extremity (CODE) ([facility])  I87 332    3  Morbid obesity with BMI of 50 0-59 9, adult ([place] [address] )  E66 01     Z68 43         Assessment and Plan :  · LLE wound progressively healing  · Chemically cauterized as below  · Continue Polymem to wound bed and Calmoseptine to periwound, see wound orders below  · Continue Surepress for compression  · No harsh cleansers such as alcohol, peroxide, or antibacterial soap, do not submerge in water  · Continue frequent elevation of leg and increase exercise/walking  · Continue to consume protein and encouraged to f/u with weight management clinic    · Encouraged to f/u with vascular surgery for 1 year s/p LLE EVLT appointment  · Followup in 3 weeks or call sooner with questions or concerns      Subjective:   Patient presents for followup of LLE venous ulcer  No issues or complaints          The following portions of the patient's history were reviewed and updated as appropriate:   Patient Active Problem List   Diagnosis    Venous ulcer of left lower extremity with varicose veins (HCC)    Acute blood loss anemia    Bleeding from varicose vein    Essential hypertension    Chronic venous hypertension (idiopathic) with ulcer and inflammation of left lower extremity (CODE) ([place] [address] )    Morbid obesity with BMI of 50 0-59 9, adult ([place] [address] )    Obstructive sleep apnea treated with continuous positive airway pressure (CPAP)     Past Medical History:   Diagnosis Date    Anxiety     CPAP (continuous positive airway pressure) dependence     Hypertension     Sleep apnea     Venous stasis ulcers of both lower extremities (HCC)      Past Surgical History:   Procedure Laterality Date    HERNIA REPAIR      umbilical    [place] ENDOVENOUS LASER, 1ST VEIN Left 8/12/2021    Procedure: ENDOVASCULAR LASER THERAPY (EVLT), Left GSV;  Surgeon: Pawan Valverde MD;  Location: [facility];  Service: Vascular    TONSILLECTOMY       No family history on file  Social History     Socioeconomic History    Marital status: /Civil Union     Spouse name: None    Number of children: None    Years of education: None    Highest education level: None   Occupational History    None   Tobacco Use    Smoking status: Never Smoker    Smokeless tobacco: Never Used   Vaping Use    Vaping Use: Never used   Substance and Sexual Activity    Alcohol use:  Yes     Alcohol/week: 3 0 standard drinks     Types: 2 Cans of beer, 1 Shots of liquor per week     Comment: daily    Drug use: Never    Sexual activity: None   Other Topics Concern    None   Social History Narrative    None     Social Determinants of Health     Financial Resource Strain: Not on file   Food Insecurity: Not on file   Transportation Needs: Not on file   Physical Activity: Not on file   Stress: Not on file   Social Connections: Not on file   Intimate Partner Violence: Not on file   Housing Stability: Not on file       Current Outpatient Medications:     fexofenadine (ALLEGRA) 180 MG tablet, Take 1 tablet by mouth daily, Disp: , Rfl:     lisinopril-hydrochlorothiazide (PRINZIDE,ZESTORETIC) 20-12 5 MG per tablet, Take 2 tablets by mouth daily, Disp: 90 tablet, Rfl: 3    metoprolol succinate (TOPROL-XL) 100 mg 24 hr tablet, Take 1 tablet (100 mg total) by mouth daily, Disp: 90 tablet, Rfl: 5    naproxen sodium (ALEVE) 220 MG tablet, Take 1 tablet by mouth every 8 (eight) hours, Disp: , Rfl:     sertraline (ZOLOFT) 100 mg tablet, Take 1 tablet (100 mg total) by mouth daily, Disp: 90 tablet, Rfl: 5  No current facility-administered medications for this visit  Review of Systems   Constitutional: Negative for appetite change, chills, fatigue, fever and unexpected weight change  HENT: Negative for congestion, hearing loss and postnasal drip  Respiratory: Negative for cough and shortness of breath  Cardiovascular: Positive for leg swelling  Musculoskeletal: Positive for gait problem  Skin: Positive for wound (LLE )  Negative for rash  Neurological: Negative for numbness  Hematological: Does not bruise/bleed easily  Psychiatric/Behavioral: Negative  Objective:  /81   Pulse 79   Temp 97 8 °F (36 6 °C)   Resp 18   Pain Score: 0-No pain     Physical Exam  Vitals reviewed  Constitutional:       General: He is not in acute distress  Appearance: Normal appearance  He is well-developed  He is morbidly obese  HENT:      Head: Normocephalic and atraumatic  Cardiovascular:      Rate and Rhythm: Normal rate  Pulmonary:      Effort: Pulmonary effort is normal    Musculoskeletal:         General: No deformity  Right lower leg: Edema present  Left lower leg: Edema present  Skin:     General: Skin is warm and dry  Findings: Wound (LLE) present  Comments: Beefy red wound bed with hypergranulated tissue  See wound assessment   Neurological:      General: No focal deficit present  Mental Status: He is alert and oriented to person, place, and time  Gait: Gait abnormal    Psychiatric:         Mood and Affect: Mood and affect normal          Behavior: Behavior normal  Behavior is cooperative               Wound 06/11/21 Venous Ulcer Pretibial Left (Active)   Wound Image Images linked 08/16/22 0847   Wound Description Beefy red;Hypergranulation;Epithelialization 08/16/22 0848   Wound Length (cm) 1 5 cm 08/16/22 0848   Wound Width (cm) 2 2 cm 08/16/22 0848   Wound Depth (cm) 0 1 cm 08/16/22 0848   Wound Surface Area (cm^2) 3 3 cm^2 08/16/22 0848   Wound Volume (cm^3) 0 33 cm^3 08/16/22 0848   Calculated Wound Volume (cm^3) 0 33 cm^3 08/16/22 0848   Change in Wound Size % 99 7 08/16/22 0848   Drainage Amount Small 08/16/22 0848   Drainage Description Serosanguineous 08/16/22 0848   Non-staged Wound Description Full thickness 08/16/22 0848   Dressing Status Intact 08/16/22 0848             Chemical Caut Of A Wound     Date/Time 8/16/2022 9:22 AM     Performed by  Josee Kendall PA-C     Authorized by Josee Kendall PA-C       Associated wounds:   Wound 06/11/21 Venous Ulcer Pretibial Left   Universal Protocol   Consent: Verbal consent obtained  Written consent obtained  Risks and benefits: risks, benefits and alternatives were discussed  Consent given by: patient  Time out: Immediately prior to procedure a "time out" was called to verify the correct patient, procedure, equipment, support staff and site/side marked as required  Patient understanding: patient states understanding of the procedure being performed  Patient identity confirmed: verbally with patient        Local anesthesia used: yes     Anesthesia   Local anesthesia used: yes  Local Anesthetic: topical anesthetic     Sedation   Patient sedated: no        Specimen: no    Culture: no   Procedure Details   Procedure Notes: After permission and placement of topical local anesthetic, 1 Silver nitrate stick(s) were used to cauterize the hypergranular tissue of the open wound  Normal saline was used to neutralize the reaction  A dressing was applied, see orders  Patient tolerated procedure well  Patient tolerance: Patient tolerated the procedure well with no immediate complications                  Wound Instructions:  Orders Placed This Encounter   Procedures    Wound cleansing and dressings     Wound cleansing and dressings                                      Wash your hands with soap and water  Remove old dressing, discard into plastic bag and place in trash    Cleanse the wound with soap and water prior to applying a clean dressing  Do not use tissue or cotton balls  Do not scrub the wound  Pat dry using gauze      Ok to shower on dressing change days       Apply a barrier cream around the wound, only apply to intact skin  Calmoseptine applied today at wound center or anything with zinc will protect the intact skin  Apply slited Polymem to the wound bed, cover with ABD , rolled gauze and secure tape  Apply surepress for compression  Change dressing every other day (wife to do dressing changes)                 Apply compression to your affected Leg(s) from mid-foot to knee making sure to cover the heel  Apply in the morning and re-wrap as needed during the day if wrap becomes loose  Remove at bedtime and elevate legs or lie down      Avoid prolonged standing in one place      Elevate leg(s) above the level of the heart when sitting or as much as possible           Wound compression and edema control                            Surepress - Left leg  Apply compression wrap to your affected Leg(s) from mid-foot to knee making sure to cover the heel  Apply in the morning and re-wrap as needed during the day if wrap becomes loose  Remove at bedtime and elevate legs or lie down      Avoid prolonged standing in one place      Elevate leg(s) above the level of the heart when sitting or as much as possible      This was done today in the wound center             Standing Status:   Future     Standing Expiration Date:   8/16/2023       Naveen Renner PA-C, [facility]      Portions of the record may have been created with voice recognition software  Occasional wrong word or "sound alike" substitutions may have occurred due to the inherent limitations of voice recognition software  Read the chart carefully and recognize, using context, where substitutions have occurred

## 2022-08-16 NOTE — PATIENT INSTRUCTIONS
Orders Placed This Encounter   Procedures    Wound cleansing and dressings     Wound cleansing and dressings                                      Wash your hands with soap and water  Remove old dressing, discard into plastic bag and place in trash  Cleanse the wound with soap and water prior to applying a clean dressing  Do not use tissue or cotton balls  Do not scrub the wound  Pat dry using gauze  Ok to shower on dressing change days  Apply a barrier cream around the wound, only apply to intact skin  Calmoseptine applied today at wound center or anything with zinc will protect the intact skin  Apply slited Polymem to the wound bed, cover with ABD , rolled gauze and secure tape  Apply surepress for compression  Change dressing every other day (wife to do dressing changes)  Apply compression to your affected Leg(s) from mid-foot to knee making sure to cover the heel  Apply in the morning and re-wrap as needed during the day if wrap becomes loose  Remove at bedtime and elevate legs or lie down  Avoid prolonged standing in one place  Elevate leg(s) above the level of the heart when sitting or as much as possible           Wound compression and edema control                            Surepress - Left leg  Apply compression wrap to your affected Leg(s) from mid-foot to knee making sure to cover the heel  Apply in the morning and re-wrap as needed during the day if wrap becomes loose  Remove at bedtime and elevate legs or lie down  Avoid prolonged standing in one place  Elevate leg(s) above the level of the heart when sitting or as much as possible  This was done today in the wound center              Standing Status:   Future     Standing Expiration Date:   8/16/2023

## 2022-08-22 DIAGNOSIS — I10 ESSENTIAL HYPERTENSION: ICD-10-CM

## 2022-08-22 RX ORDER — LISINOPRIL AND HYDROCHLOROTHIAZIDE 20; 12.5 MG/1; MG/1
2 TABLET ORAL DAILY
Qty: 180 TABLET | Refills: 0 | Status: SHIPPED | OUTPATIENT
Start: 2022-08-22

## 2022-12-13 DIAGNOSIS — I10 ESSENTIAL HYPERTENSION: ICD-10-CM

## 2022-12-13 RX ORDER — LISINOPRIL AND HYDROCHLOROTHIAZIDE 20; 12.5 MG/1; MG/1
2 TABLET ORAL DAILY
Qty: 180 TABLET | Refills: 0 | Status: SHIPPED | OUTPATIENT
Start: 2022-12-13

## 2023-03-08 DIAGNOSIS — I10 ESSENTIAL HYPERTENSION: ICD-10-CM

## 2023-03-08 RX ORDER — METOPROLOL SUCCINATE 100 MG/1
100 TABLET, EXTENDED RELEASE ORAL DAILY
Qty: 90 TABLET | Refills: 0 | OUTPATIENT
Start: 2023-03-08

## 2023-03-16 DIAGNOSIS — I10 ESSENTIAL HYPERTENSION: ICD-10-CM

## 2023-03-16 RX ORDER — METOPROLOL SUCCINATE 100 MG/1
100 TABLET, EXTENDED RELEASE ORAL DAILY
Qty: 30 TABLET | Refills: 0 | OUTPATIENT
Start: 2023-03-16

## 2023-03-24 ENCOUNTER — OFFICE VISIT (OUTPATIENT)
Dept: INTERNAL MEDICINE CLINIC | Facility: CLINIC | Age: 63
End: 2023-03-24

## 2023-03-24 VITALS
SYSTOLIC BLOOD PRESSURE: 128 MMHG | HEART RATE: 98 BPM | HEIGHT: 70 IN | DIASTOLIC BLOOD PRESSURE: 74 MMHG | OXYGEN SATURATION: 97 % | BODY MASS INDEX: 45.1 KG/M2 | RESPIRATION RATE: 16 BRPM | WEIGHT: 315 LBS

## 2023-03-24 DIAGNOSIS — Z12.11 COLON CANCER SCREENING: ICD-10-CM

## 2023-03-24 DIAGNOSIS — E78.2 MIXED HYPERLIPIDEMIA: ICD-10-CM

## 2023-03-24 DIAGNOSIS — Z12.5 PROSTATE CANCER SCREENING: ICD-10-CM

## 2023-03-24 DIAGNOSIS — R73.01 ABNORMAL FASTING GLUCOSE: ICD-10-CM

## 2023-03-24 DIAGNOSIS — I87.332 CHRONIC VENOUS HYPERTENSION (IDIOPATHIC) WITH ULCER AND INFLAMMATION OF LEFT LOWER EXTREMITY (CODE): ICD-10-CM

## 2023-03-24 DIAGNOSIS — F41.9 ANXIETY: ICD-10-CM

## 2023-03-24 DIAGNOSIS — I10 ESSENTIAL HYPERTENSION: Primary | ICD-10-CM

## 2023-03-24 DIAGNOSIS — Z99.89 OBSTRUCTIVE SLEEP APNEA TREATED WITH CONTINUOUS POSITIVE AIRWAY PRESSURE (CPAP): ICD-10-CM

## 2023-03-24 DIAGNOSIS — G47.33 OBSTRUCTIVE SLEEP APNEA TREATED WITH CONTINUOUS POSITIVE AIRWAY PRESSURE (CPAP): ICD-10-CM

## 2023-03-24 PROBLEM — L97.929 VENOUS ULCER OF LEFT LOWER EXTREMITY WITH VARICOSE VEINS (HCC): Status: RESOLVED | Noted: 2021-06-05 | Resolved: 2023-03-24

## 2023-03-24 PROBLEM — D62 ACUTE BLOOD LOSS ANEMIA: Status: RESOLVED | Noted: 2021-06-05 | Resolved: 2023-03-24

## 2023-03-24 PROBLEM — I83.029 VENOUS ULCER OF LEFT LOWER EXTREMITY WITH VARICOSE VEINS (HCC): Status: RESOLVED | Noted: 2021-06-05 | Resolved: 2023-03-24

## 2023-03-24 PROBLEM — I83.899 BLEEDING FROM VARICOSE VEIN: Status: RESOLVED | Noted: 2021-06-05 | Resolved: 2023-03-24

## 2023-03-24 RX ORDER — LISINOPRIL AND HYDROCHLOROTHIAZIDE 20; 12.5 MG/1; MG/1
2 TABLET ORAL DAILY
Qty: 180 TABLET | Refills: 1 | Status: SHIPPED | OUTPATIENT
Start: 2023-03-24

## 2023-03-24 RX ORDER — SERTRALINE HYDROCHLORIDE 100 MG/1
100 TABLET, FILM COATED ORAL DAILY
Qty: 90 TABLET | Refills: 5 | Status: SHIPPED | OUTPATIENT
Start: 2023-03-24 | End: 2023-06-22

## 2023-03-24 RX ORDER — METOPROLOL SUCCINATE 100 MG/1
100 TABLET, EXTENDED RELEASE ORAL DAILY
Qty: 90 TABLET | Refills: 1 | Status: SHIPPED | OUTPATIENT
Start: 2023-03-24 | End: 2023-06-22

## 2023-03-24 NOTE — PROGRESS NOTES
Assessment/Plan:     Patient is here for follow up; has not been seen in over a year; PMH for HTN which is controlled with medication regimen; refills sent today for 6 months  Says his leg wound has healed; will need updated set of labs; colonoscopy is due and ordered- last screen in 2017 and recommended to repeat in 5 years; Discussed weight loss and diet; recommend he see bariatric but not interested at this time; will think about medication  Quality Measures:     BMI Counseling: Body mass index is 57 68 kg/m²  The BMI is above normal  Nutrition recommendations include decreasing portion sizes and encouraging healthy choices of fruits and vegetables  Exercise recommendations include moderate physical activity 150 minutes/week  Rationale for BMI follow-up plan is due to patient being overweight or obese  Depression Screening and Follow-up Plan: Patient was screened for depression during today's encounter  They screened negative with a PHQ-2 score of 0  Return in about 6 months (around 9/24/2023)  No problem-specific Assessment & Plan notes found for this encounter  Diagnoses and all orders for this visit:    Essential hypertension  -     CBC and differential; Future  -     Comprehensive metabolic panel; Future  -     TSH, 3rd generation with Free T4 reflex; Future  -     metoprolol succinate (TOPROL-XL) 100 mg 24 hr tablet; Take 1 tablet (100 mg total) by mouth daily  -     lisinopril-hydrochlorothiazide (PRINZIDE,ZESTORETIC) 20-12 5 MG per tablet; Take 2 tablets by mouth daily    Chronic venous hypertension (idiopathic) with ulcer and inflammation of left lower extremity (CODE) (HCC)    Anxiety  -     sertraline (ZOLOFT) 100 mg tablet; Take 1 tablet (100 mg total) by mouth daily    Obstructive sleep apnea treated with continuous positive airway pressure (CPAP)    Prostate cancer screening  -     PSA, total and free;  Future    Abnormal fasting glucose  -     Hemoglobin A1C; Future    Mixed hyperlipidemia  -     Lipid Panel with Direct LDL reflex; Future    Colon cancer screening  -     Ambulatory referral for colonoscopy; Future          Subjective:      Patient ID: Viviane Cockayne is a 65 y o  male  Here for routine visit  ALLERGIES:  Allergies   Allergen Reactions   • Hydromorphone Other (See Comments)     hypotension  hypotension       CURRENT MEDICATIONS:    Current Outpatient Medications:   •  fexofenadine (ALLEGRA) 180 MG tablet, Take 1 tablet by mouth daily, Disp: , Rfl:   •  lisinopril-hydrochlorothiazide (PRINZIDE,ZESTORETIC) 20-12 5 MG per tablet, Take 2 tablets by mouth daily, Disp: 180 tablet, Rfl: 1  •  metoprolol succinate (TOPROL-XL) 100 mg 24 hr tablet, Take 1 tablet (100 mg total) by mouth daily, Disp: 90 tablet, Rfl: 1  •  naproxen sodium (ALEVE) 220 MG tablet, Take 1 tablet by mouth every 8 (eight) hours, Disp: , Rfl:   •  sertraline (ZOLOFT) 100 mg tablet, Take 1 tablet (100 mg total) by mouth daily, Disp: 90 tablet, Rfl: 5    ACTIVE PROBLEM LIST:  Patient Active Problem List   Diagnosis   • Essential hypertension   • Morbid obesity with BMI of 50 0-59 9, adult (HCC)   • Obstructive sleep apnea treated with continuous positive airway pressure (CPAP)       PAST MEDICAL HISTORY:  Past Medical History:   Diagnosis Date   • Anxiety    • CPAP (continuous positive airway pressure) dependence    • Hypertension    • Sleep apnea    • Venous stasis ulcers of both lower extremities (HCC)        PAST SURGICAL HISTORY:  Past Surgical History:   Procedure Laterality Date   • HERNIA REPAIR      umbilical   • WA ENDOVEN ABLTJ INCMPTNT VEIN XTR LASER 1ST VEIN Left 8/12/2021    Procedure: ENDOVASCULAR LASER THERAPY (EVLT), Left GSV;  Surgeon: Aly Hull MD;  Location: [facility];  Service: Vascular   • TONSILLECTOMY         FAMILY HISTORY:  History reviewed  No pertinent family history      SOCIAL HISTORY:  Social History     Socioeconomic History   • Marital status: /Civil [employer]     Spouse name: Not on file   • Number of children: Not on file   • Years of education: Not on file   • Highest education level: Not on file   Occupational History   • Not on file   Tobacco Use   • Smoking status: Never   • Smokeless tobacco: Never   Vaping Use   • Vaping Use: Never used   Substance and Sexual Activity   • Alcohol use: Yes     Alcohol/week: 3 0 standard drinks     Types: 2 Cans of beer, 1 Shots of liquor per week     Comment: daily   • Drug use: Never   • Sexual activity: Yes   Other Topics Concern   • Not on file   Social History Narrative   • Not on file     Social Determinants of Health     Financial Resource Strain: Not on file   Food Insecurity: Not on file   Transportation Needs: Not on file   Physical Activity: Not on file   Stress: Not on file   Social Connections: Not on file   Intimate Partner Violence: Not on file   Housing Stability: Not on file       Review of Systems   Allergic/Immunologic: Positive for environmental allergies  All other systems reviewed and are negative  Objective:  Vitals:    03/24/23 0908   BP: 128/74   BP Location: Left arm   Patient Position: Sitting   Pulse: 98   Resp: 16   SpO2: 97%   Weight: (!) 182 kg (402 lb)   Height: 5' 10" (1 778 m)     Body mass index is 57 68 kg/m²  Physical Exam  Vitals and nursing note reviewed  Constitutional:       Appearance: Normal appearance  He is obese  HENT:      Head: Normocephalic and atraumatic  Cardiovascular:      Rate and Rhythm: Normal rate and regular rhythm  Heart sounds: Normal heart sounds  Pulmonary:      Effort: Pulmonary effort is normal       Breath sounds: Normal breath sounds  Musculoskeletal:         General: Normal range of motion  Cervical back: Normal range of motion  Right lower leg: Edema present  Left lower leg: Edema present  Lymphadenopathy:      Cervical: No cervical adenopathy  Skin:     General: Skin is warm and dry     Neurological: General: No focal deficit present  Mental Status: He is alert and oriented to person, place, and time  Mental status is at baseline  Psychiatric:         Mood and Affect: Mood normal            RESULTS:    In chart    This note was created with voice recognition software  Phonic, grammatical and spelling errors may be present within the note as a result

## 2023-03-27 ENCOUNTER — TELEPHONE (OUTPATIENT)
Dept: GASTROENTEROLOGY | Facility: CLINIC | Age: 63
End: 2023-03-27

## 2023-03-27 ENCOUNTER — PREP FOR PROCEDURE (OUTPATIENT)
Dept: GASTROENTEROLOGY | Facility: CLINIC | Age: 63
End: 2023-03-27

## 2023-03-27 DIAGNOSIS — Z12.11 SCREENING FOR COLON CANCER: Primary | ICD-10-CM

## 2023-03-27 NOTE — TELEPHONE ENCOUNTER
Scheduled date of colonoscopy (as of today): 4/17/23  Physician performing colonoscopy: JAGUAR  Location of colonoscopy: [facility]

## 2023-03-29 ENCOUNTER — APPOINTMENT (OUTPATIENT)
Dept: LAB | Facility: CLINIC | Age: 63
End: 2023-03-29

## 2023-03-29 DIAGNOSIS — E78.2 MIXED HYPERLIPIDEMIA: ICD-10-CM

## 2023-03-29 DIAGNOSIS — Z12.5 PROSTATE CANCER SCREENING: ICD-10-CM

## 2023-03-29 DIAGNOSIS — R73.01 ABNORMAL FASTING GLUCOSE: ICD-10-CM

## 2023-03-29 DIAGNOSIS — I10 ESSENTIAL HYPERTENSION: ICD-10-CM

## 2023-03-29 LAB
ALBUMIN SERPL BCP-MCNC: 3.9 G/DL (ref 3.5–5)
ALP SERPL-CCNC: 77 U/L (ref 46–116)
ALT SERPL W P-5'-P-CCNC: 100 U/L (ref 12–78)
ANION GAP SERPL CALCULATED.3IONS-SCNC: 4 MMOL/L (ref 4–13)
AST SERPL W P-5'-P-CCNC: 54 U/L (ref 5–45)
BASOPHILS # BLD AUTO: 0.04 THOUSANDS/ÂΜL (ref 0–0.1)
BASOPHILS NFR BLD AUTO: 1 % (ref 0–1)
BILIRUB SERPL-MCNC: 0.65 MG/DL (ref 0.2–1)
BUN SERPL-MCNC: 23 MG/DL (ref 5–25)
CALCIUM SERPL-MCNC: 9.4 MG/DL (ref 8.3–10.1)
CHLORIDE SERPL-SCNC: 106 MMOL/L (ref 96–108)
CHOLEST SERPL-MCNC: 107 MG/DL
CO2 SERPL-SCNC: 26 MMOL/L (ref 21–32)
CREAT SERPL-MCNC: 1.36 MG/DL (ref 0.6–1.3)
EOSINOPHIL # BLD AUTO: 0.24 THOUSAND/ÂΜL (ref 0–0.61)
EOSINOPHIL NFR BLD AUTO: 3 % (ref 0–6)
ERYTHROCYTE [DISTWIDTH] IN BLOOD BY AUTOMATED COUNT: 14.6 % (ref 11.6–15.1)
GFR SERPL CREATININE-BSD FRML MDRD: 55 ML/MIN/1.73SQ M
GLUCOSE P FAST SERPL-MCNC: 123 MG/DL (ref 65–99)
HCT VFR BLD AUTO: 42.1 % (ref 36.5–49.3)
HDLC SERPL-MCNC: 42 MG/DL
HGB BLD-MCNC: 13.6 G/DL (ref 12–17)
IMM GRANULOCYTES # BLD AUTO: 0.04 THOUSAND/UL (ref 0–0.2)
IMM GRANULOCYTES NFR BLD AUTO: 1 % (ref 0–2)
LDLC SERPL CALC-MCNC: 44 MG/DL (ref 0–100)
LYMPHOCYTES # BLD AUTO: 1.22 THOUSANDS/ÂΜL (ref 0.6–4.47)
LYMPHOCYTES NFR BLD AUTO: 17 % (ref 14–44)
MCH RBC QN AUTO: 27.8 PG (ref 26.8–34.3)
MCHC RBC AUTO-ENTMCNC: 32.3 G/DL (ref 31.4–37.4)
MCV RBC AUTO: 86 FL (ref 82–98)
MONOCYTES # BLD AUTO: 0.55 THOUSAND/ÂΜL (ref 0.17–1.22)
MONOCYTES NFR BLD AUTO: 8 % (ref 4–12)
NEUTROPHILS # BLD AUTO: 4.97 THOUSANDS/ÂΜL (ref 1.85–7.62)
NEUTS SEG NFR BLD AUTO: 70 % (ref 43–75)
NRBC BLD AUTO-RTO: 0 /100 WBCS
PLATELET # BLD AUTO: 200 THOUSANDS/UL (ref 149–390)
PMV BLD AUTO: 10.4 FL (ref 8.9–12.7)
POTASSIUM SERPL-SCNC: 4.4 MMOL/L (ref 3.5–5.3)
PROT SERPL-MCNC: 7.6 G/DL (ref 6.4–8.4)
RBC # BLD AUTO: 4.89 MILLION/UL (ref 3.88–5.62)
SODIUM SERPL-SCNC: 136 MMOL/L (ref 135–147)
TRIGL SERPL-MCNC: 106 MG/DL
TSH SERPL DL<=0.05 MIU/L-ACNC: 2.19 UIU/ML (ref 0.45–4.5)
WBC # BLD AUTO: 7.06 THOUSAND/UL (ref 4.31–10.16)

## 2023-03-30 LAB
PSA FREE MFR SERPL: 36 %
PSA FREE SERPL-MCNC: 0.36 NG/ML
PSA SERPL-MCNC: 1 NG/ML (ref 0–4)

## 2023-03-31 DIAGNOSIS — N17.9 AKI (ACUTE KIDNEY INJURY) (HCC): Primary | ICD-10-CM

## 2023-03-31 DIAGNOSIS — R79.89 ELEVATED LFTS: ICD-10-CM

## 2023-03-31 LAB
EST. AVERAGE GLUCOSE BLD GHB EST-MCNC: 134 MG/DL
HBA1C MFR BLD: 6.3 %

## 2023-04-17 PROBLEM — T88.4XXA DIFFICULT INTUBATION: Status: ACTIVE | Noted: 2023-04-17

## 2023-05-22 DIAGNOSIS — F41.9 ANXIETY: ICD-10-CM

## 2023-05-22 RX ORDER — SERTRALINE HYDROCHLORIDE 100 MG/1
100 TABLET, FILM COATED ORAL DAILY
Qty: 90 TABLET | Refills: 0 | Status: SHIPPED | OUTPATIENT
Start: 2023-05-22 | End: 2023-05-24

## 2023-05-24 DIAGNOSIS — F41.9 ANXIETY: ICD-10-CM

## 2023-05-24 RX ORDER — SERTRALINE HYDROCHLORIDE 100 MG/1
150 TABLET, FILM COATED ORAL DAILY
Qty: 135 TABLET | Refills: 3 | Status: SHIPPED | OUTPATIENT
Start: 2023-05-24 | End: 2023-08-22

## 2023-09-13 DIAGNOSIS — I10 ESSENTIAL HYPERTENSION: ICD-10-CM

## 2023-09-13 RX ORDER — LISINOPRIL AND HYDROCHLOROTHIAZIDE 20; 12.5 MG/1; MG/1
2 TABLET ORAL DAILY
Qty: 180 TABLET | Refills: 1 | Status: SHIPPED | OUTPATIENT
Start: 2023-09-13

## 2023-09-13 RX ORDER — METOPROLOL SUCCINATE 100 MG/1
100 TABLET, EXTENDED RELEASE ORAL DAILY
Qty: 90 TABLET | Refills: 1 | Status: SHIPPED | OUTPATIENT
Start: 2023-09-13 | End: 2023-12-12

## 2023-09-28 ENCOUNTER — OFFICE VISIT (OUTPATIENT)
Dept: INTERNAL MEDICINE CLINIC | Facility: CLINIC | Age: 63
End: 2023-09-28
Payer: COMMERCIAL

## 2023-09-28 VITALS
HEART RATE: 103 BPM | DIASTOLIC BLOOD PRESSURE: 70 MMHG | SYSTOLIC BLOOD PRESSURE: 138 MMHG | WEIGHT: 315 LBS | BODY MASS INDEX: 45.1 KG/M2 | RESPIRATION RATE: 20 BRPM | TEMPERATURE: 98.6 F | OXYGEN SATURATION: 96 % | HEIGHT: 70 IN

## 2023-09-28 DIAGNOSIS — Z12.5 PROSTATE CANCER SCREENING: ICD-10-CM

## 2023-09-28 DIAGNOSIS — N17.9 AKI (ACUTE KIDNEY INJURY) (HCC): ICD-10-CM

## 2023-09-28 DIAGNOSIS — G47.33 OBSTRUCTIVE SLEEP APNEA TREATED WITH CONTINUOUS POSITIVE AIRWAY PRESSURE (CPAP): ICD-10-CM

## 2023-09-28 DIAGNOSIS — E78.2 MIXED HYPERLIPIDEMIA: ICD-10-CM

## 2023-09-28 DIAGNOSIS — E66.01 MORBID OBESITY WITH BMI OF 50.0-59.9, ADULT (HCC): ICD-10-CM

## 2023-09-28 DIAGNOSIS — I10 ESSENTIAL HYPERTENSION: Primary | ICD-10-CM

## 2023-09-28 DIAGNOSIS — F41.9 ANXIETY: ICD-10-CM
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
## 2023-09-28 DIAGNOSIS — R79.89 ABNORMAL LFTS: ICD-10-CM

## 2023-09-28 PROBLEM — T88.4XXA DIFFICULT INTUBATION: Status: RESOLVED | Noted: 2023-04-17 | Resolved: 2023-09-28

## 2023-09-28 PROCEDURE — 99214 OFFICE O/P EST MOD 30 MIN: CPT | Performed by: INTERNAL MEDICINE

## 2023-09-28 NOTE — PROGRESS NOTES
Assessment/Plan:     Patient is here for routine follow-up. We reviewed his chronic medical conditions. I ordered blood work for today including CBC, CMP, TSH, A1c, lipid panel.,  PSA. Hypertension is controlled. Continue metoprolol and lisinopril hydrochlorothiazide. Recheck CMP and if creatinine still abnormal, will stop diuretic. And increase lisinopril. Patient is in agreement with this. Continue to use CPAP for sleep apnea. Continue Zoloft for anxiety. Check TSH. Discussed diet changes weight loss. Discussed alcohol use. Does drink a little bit too much. Slightly abnormal LFTs on last set of blood work, will repeat today. Quality Measures:     BMI Counseling: There is no height or weight on file to calculate BMI. The BMI is above normal. Nutrition recommendations include decreasing portion sizes and encouraging healthy choices of fruits and vegetables. Exercise recommendations include moderate physical activity 150 minutes/week. Rationale for BMI follow-up plan is due to patient being overweight or obese. Depression Screening and Follow-up Plan: Clincally patient does not have depression. No treatment is required. Return in about 6 months (around 3/28/2024). No problem-specific Assessment & Plan notes found for this encounter. Diagnoses and all orders for this visit:    Essential hypertension  -     CBC and differential; Future  -     TSH, 3rd generation with Free T4 reflex; Future    Obstructive sleep apnea treated with continuous positive airway pressure (CPAP)  -     CBC and differential; Future  -     TSH, 3rd generation with Free T4 reflex; Future    Morbid obesity with BMI of 50.0-59.9, adult (HCC)    Abnormal LFTs  -     Comprehensive metabolic panel; Future    DIANE (acute kidney injury) ([address])  -     Comprehensive metabolic panel;  Future    Anxiety    Mixed hyperlipidemia  -     Hemoglobin A1C; Future  -     Lipid Panel with Direct LDL reflex; Future    Prostate cancer screening  -     PSA, total and free; Future    Tick bite, unspecified site, initial encounter  -     Lyme Disease Serology w/Reflex; Future          Subjective:      Patient ID: Ilene Monson is a 65 y.o. male. Here for routine f/u. ALLERGIES:  Allergies   Allergen Reactions   • Hydromorphone Other (See Comments)     hypotension  hypotension       CURRENT MEDICATIONS:    Current Outpatient Medications:   •  fexofenadine (ALLEGRA) 180 MG tablet, Take 1 tablet by mouth daily, Disp: , Rfl:   •  lisinopril-hydrochlorothiazide (PRINZIDE,ZESTORETIC) 20-12.5 MG per tablet, Take 2 tablets by mouth daily, Disp: 180 tablet, Rfl: 1  •  metoprolol succinate (TOPROL-XL) 100 mg 24 hr tablet, Take 1 tablet (100 mg total) by mouth daily, Disp: 90 tablet, Rfl: 1  •  naproxen sodium (ALEVE) 220 MG tablet, Take 1 tablet by mouth every 8 (eight) hours, Disp: , Rfl:   •  sertraline (ZOLOFT) 100 mg tablet, Take 1.5 tablets (150 mg total) by mouth daily, Disp: 135 tablet, Rfl: 3    ACTIVE PROBLEM LIST:  Patient Active Problem List   Diagnosis   • Essential hypertension   • Morbid obesity with BMI of 50.0-59.9, adult (HCC)   • Obstructive sleep apnea treated with continuous positive airway pressure (CPAP)       PAST MEDICAL HISTORY:  Past Medical History:   Diagnosis Date   • Anxiety    • CPAP (continuous positive airway pressure) dependence    • Hypertension    • Sleep apnea    • Venous stasis ulcers of both lower extremities (HCC)        PAST SURGICAL HISTORY:  Past Surgical History:   Procedure Laterality Date   • HERNIA REPAIR      umbilical   • OH ENDOVEN ABLTJ INCMPTNT VEIN XTR LASER 1ST VEIN Left 8/12/2021    Procedure: ENDOVASCULAR LASER THERAPY (EVLT), Left GSV;  Surgeon: Marla Clark MD;  Location: [facility];  Service: Vascular   • TONSILLECTOMY         FAMILY HISTORY:  History reviewed. No pertinent family history.     SOCIAL HISTORY:  Social History     Socioeconomic History   • Marital status: /Civil Union     Spouse name: Not on file   • Number of children: Not on file   • Years of education: Not on file   • Highest education level: Not on file   Occupational History   • Not on file   Tobacco Use   • Smoking status: Never   • Smokeless tobacco: Never   Vaping Use   • Vaping Use: Never used   Substance and Sexual Activity   • Alcohol use: Yes     Alcohol/week: 3.0 standard drinks of alcohol     Types: 2 Cans of beer, 1 Shots of liquor per week     Comment: daily   • Drug use: Never   • Sexual activity: Yes   Other Topics Concern   • Not on file   Social History Narrative   • Not on file     Social Determinants of Health     Financial Resource Strain: Not on file   Food Insecurity: Not on file   Transportation Needs: Not on file   Physical Activity: Not on file   Stress: Not on file   Social Connections: Not on file   Intimate Partner Violence: Not on file   Housing Stability: Not on file       Review of Systems   Constitutional: Negative for chills and fever. HENT: Negative for ear pain and sore throat. Eyes: Negative for pain and visual disturbance. Respiratory: Negative for cough and shortness of breath. Cardiovascular: Negative for chest pain and palpitations. Gastrointestinal: Negative for abdominal pain and vomiting. Genitourinary: Negative for dysuria and hematuria. Musculoskeletal: Negative for arthralgias and back pain. Skin: Negative for color change and rash. Neurological: Negative for seizures and syncope. All other systems reviewed and are negative. Objective:  Vitals:    09/28/23 0903   BP: 138/70   BP Location: Left arm   Patient Position: Sitting   Cuff Size: Large   Pulse: 103   Resp: 20   Temp: 98.6 °F (37 °C)   TempSrc: Tympanic   SpO2: 96%   Weight: (!) 189 kg (416 lb 12.8 oz)   Height: 5' 10" (1.778 m)     Body mass index is 59.8 kg/m². Physical Exam  Vitals and nursing note reviewed.    Constitutional:       Appearance: Normal appearance. He is obese. HENT:      Head: Normocephalic and atraumatic. Eyes:      Comments: wears glasses     Cardiovascular:      Rate and Rhythm: Normal rate and regular rhythm. Heart sounds: Normal heart sounds. Pulmonary:      Effort: Pulmonary effort is normal.      Breath sounds: Normal breath sounds. Musculoskeletal:         General: Normal range of motion. Cervical back: Normal range of motion. Lymphadenopathy:      Cervical: No cervical adenopathy. Skin:     General: Skin is warm and dry. Neurological:      General: No focal deficit present. Mental Status: He is alert and oriented to person, place, and time. Mental status is at baseline. Psychiatric:         Mood and Affect: Mood normal.           RESULTS:  Hemoglobin A1C   Date/Time Value Ref Range Status   03/29/2023 09:47 AM 6.3 (H) Normal 3.8-5.6%; PreDiabetic 5.7-6.4%; Diabetic >=6.5%; Glycemic control for adults with diabetes <7.0% % Final     Cholesterol   Date/Time Value Ref Range Status   03/29/2023 09:47  See Comment mg/dL Final     Comment:     Cholesterol:         Pediatric <18 Years        Desirable          <170 mg/dL      Borderline High    170-199 mg/dL      High               >=200 mg/dL        Adult >=18 Years            Desirable        <200 mg/dL      Borderline High  200-239 mg/dL      High             >239 mg/dL       Triglycerides   Date/Time Value Ref Range Status   03/29/2023 09:47  See Comment mg/dL Final     Comment:     Triglyceride:     0-9Y            <75mg/dL     10Y-17Y         <90 mg/dL       >=18Y     Normal          <150 mg/dL     Borderline High 150-199 mg/dL     High            200-499 mg/dL        Very High       >499 mg/dL    Specimen collection should occur prior to N-Acetylcysteine or Metamizole administration due to the potential for falsely depressed results.      HDL, Direct   Date/Time Value Ref Range Status   03/29/2023 09:47 AM 42 >=40 mg/dL Final     Comment: Specimen collection should occur prior to Metamizole administration due to the potential for falsley depressed results. LDL Calculated   Date/Time Value Ref Range Status   03/29/2023 09:47 AM 44 0 - 100 mg/dL Final     Comment:     LDL Cholesterol:     Optimal           <100 mg/dl     Near Optimal      100-129 mg/dl     Above Optimal       Borderline High 130-159 mg/dl       High            160-189 mg/dl       Very High       >189 mg/dl         This screening LDL is a calculated result. It does not have the accuracy of the Direct Measured LDL in the monitoring of patients with hyperlipidemia and/or statin therapy. Direct Measure LDL (AXU045) must be ordered separately in these patients. Hemoglobin   Date/Time Value Ref Range Status   03/29/2023 09:47 AM 13.6 12.0 - 17.0 g/dL Final     Hematocrit   Date/Time Value Ref Range Status   03/29/2023 09:47 AM 42.1 36.5 - 49.3 % Final     Platelets   Date/Time Value Ref Range Status   03/29/2023 09:47  149 - 390 Thousands/uL Final     Prostate Specific Antigen Total   Date/Time Value Ref Range Status   03/29/2023 09:47 AM 1.0 0.0 - 4.0 ng/mL Final     Comment:     Roche ECLIA methodology. According to the American Urological Association, Serum PSA should  decrease and remain at undetectable levels after radical  prostatectomy. The AUA defines biochemical recurrence as an initial  PSA value 0.2 ng/mL or greater followed by a subsequent confirmatory  PSA value 0.2 ng/mL or greater. Values obtained with different assay methods or kits cannot be used  interchangeably. Results cannot be interpreted as absolute evidence  of the presence or absence of malignant disease. TSH 3RD GENERATON   Date/Time Value Ref Range Status   03/29/2023 09:47 AM 2.190 0.450 - 4.500 uIU/mL Final     Comment:     Adult TSH (3rd generation) reference range follows the recommended guidelines of the American Thyroid Association, January, 2020.      Sodium   Date/Time Value Ref Range Status   03/29/2023 09:47  135 - 147 mmol/L Final     BUN   Date/Time Value Ref Range Status   03/29/2023 09:47 AM 23 5 - 25 mg/dL Final     Creatinine   Date/Time Value Ref Range Status   03/29/2023 09:47 AM 1.36 (H) 0.60 - 1.30 mg/dL Final     Comment:     Standardized to IDMS reference method      In chart    This note was created with voice recognition software. Phonic, grammatical and spelling errors may be present within the note as a result.

## 2023-11-24 ENCOUNTER — APPOINTMENT (OUTPATIENT)
Dept: LAB | Facility: CLINIC | Age: 63
End: 2023-11-24
Payer: COMMERCIAL

## 2023-11-24 DIAGNOSIS — R79.89 ABNORMAL LFTS: ICD-10-CM

## 2023-11-24 DIAGNOSIS — E78.2 MIXED HYPERLIPIDEMIA: ICD-10-CM

## 2023-11-24 DIAGNOSIS — I10 ESSENTIAL HYPERTENSION: Primary | ICD-10-CM

## 2023-11-24 DIAGNOSIS — R79.89 ELEVATED LFTS: ICD-10-CM
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
## 2023-11-24 DIAGNOSIS — G47.33 OBSTRUCTIVE SLEEP APNEA TREATED WITH CONTINUOUS POSITIVE AIRWAY PRESSURE (CPAP): ICD-10-CM

## 2023-11-24 DIAGNOSIS — Z12.5 PROSTATE CANCER SCREENING: ICD-10-CM

## 2023-11-24 DIAGNOSIS — N17.9 AKI (ACUTE KIDNEY INJURY) (HCC): ICD-10-CM

## 2023-11-24 LAB
ALBUMIN SERPL BCP-MCNC: 4.4 G/DL (ref 3.5–5)
ALP SERPL-CCNC: 78 U/L (ref 34–104)
ALT SERPL W P-5'-P-CCNC: 76 U/L (ref 7–52)
ANION GAP SERPL CALCULATED.3IONS-SCNC: 9 MMOL/L
AST SERPL W P-5'-P-CCNC: 39 U/L (ref 13–39)
B BURGDOR IGG+IGM SER QL IA: NEGATIVE
BASOPHILS # BLD AUTO: 0.04 THOUSANDS/ÂΜL (ref 0–0.1)
BASOPHILS NFR BLD AUTO: 1 % (ref 0–1)
BILIRUB SERPL-MCNC: 0.52 MG/DL (ref 0.2–1)
BUN SERPL-MCNC: 22 MG/DL (ref 5–25)
CALCIUM SERPL-MCNC: 9.6 MG/DL (ref 8.4–10.2)
CHLORIDE SERPL-SCNC: 103 MMOL/L (ref 96–108)
CHOLEST SERPL-MCNC: 112 MG/DL
CO2 SERPL-SCNC: 31 MMOL/L (ref 21–32)
CREAT SERPL-MCNC: 1.69 MG/DL (ref 0.6–1.3)
EOSINOPHIL # BLD AUTO: 0.21 THOUSAND/ÂΜL (ref 0–0.61)
EOSINOPHIL NFR BLD AUTO: 3 % (ref 0–6)
ERYTHROCYTE [DISTWIDTH] IN BLOOD BY AUTOMATED COUNT: 14.2 % (ref 11.6–15.1)
EST. AVERAGE GLUCOSE BLD GHB EST-MCNC: 151 MG/DL
GFR SERPL CREATININE-BSD FRML MDRD: 42 ML/MIN/1.73SQ M
GLUCOSE P FAST SERPL-MCNC: 155 MG/DL (ref 65–99)
HBA1C MFR BLD: 6.9 %
HCT VFR BLD AUTO: 44.6 % (ref 36.5–49.3)
HDLC SERPL-MCNC: 40 MG/DL
HGB BLD-MCNC: 14 G/DL (ref 12–17)
IMM GRANULOCYTES # BLD AUTO: 0.04 THOUSAND/UL (ref 0–0.2)
IMM GRANULOCYTES NFR BLD AUTO: 1 % (ref 0–2)
LDLC SERPL CALC-MCNC: 48 MG/DL (ref 0–100)
LYMPHOCYTES # BLD AUTO: 1.06 THOUSANDS/ÂΜL (ref 0.6–4.47)
LYMPHOCYTES NFR BLD AUTO: 16 % (ref 14–44)
MCH RBC QN AUTO: 27.7 PG (ref 26.8–34.3)
MCHC RBC AUTO-ENTMCNC: 31.4 G/DL (ref 31.4–37.4)
MCV RBC AUTO: 88 FL (ref 82–98)
MONOCYTES # BLD AUTO: 0.47 THOUSAND/ÂΜL (ref 0.17–1.22)
MONOCYTES NFR BLD AUTO: 7 % (ref 4–12)
NEUTROPHILS # BLD AUTO: 4.79 THOUSANDS/ÂΜL (ref 1.85–7.62)
NEUTS SEG NFR BLD AUTO: 72 % (ref 43–75)
NRBC BLD AUTO-RTO: 0 /100 WBCS
PLATELET # BLD AUTO: 202 THOUSANDS/UL (ref 149–390)
PMV BLD AUTO: 10.2 FL (ref 8.9–12.7)
POTASSIUM SERPL-SCNC: 5.3 MMOL/L (ref 3.5–5.3)
PROT SERPL-MCNC: 7.3 G/DL (ref 6.4–8.4)
RBC # BLD AUTO: 5.05 MILLION/UL (ref 3.88–5.62)
SODIUM SERPL-SCNC: 143 MMOL/L (ref 135–147)
TRIGL SERPL-MCNC: 118 MG/DL
TSH SERPL DL<=0.05 MIU/L-ACNC: 2.22 UIU/ML (ref 0.45–4.5)
WBC # BLD AUTO: 6.61 THOUSAND/UL (ref 4.31–10.16)

## 2023-11-24 PROCEDURE — 80061 LIPID PANEL: CPT

## 2023-11-24 PROCEDURE — 85025 COMPLETE CBC W/AUTO DIFF WBC: CPT

## 2023-11-24 PROCEDURE — 84443 ASSAY THYROID STIM HORMONE: CPT

## 2023-11-24 PROCEDURE — 84154 ASSAY OF PSA FREE: CPT

## 2023-11-24 PROCEDURE — 84153 ASSAY OF PSA TOTAL: CPT

## 2023-11-24 PROCEDURE — 83036 HEMOGLOBIN GLYCOSYLATED A1C: CPT

## 2023-11-24 PROCEDURE — 80053 COMPREHEN METABOLIC PANEL: CPT

## 2023-11-24 PROCEDURE — 36415 COLL VENOUS BLD VENIPUNCTURE: CPT

## 2023-11-24 PROCEDURE — 86618 LYME DISEASE ANTIBODY: CPT

## 2023-11-25 LAB
PSA FREE MFR SERPL: 42.5 %
PSA FREE SERPL-MCNC: 0.51 NG/ML
PSA SERPL-MCNC: 1.2 NG/ML (ref 0–4)

## 2023-12-03 DIAGNOSIS — E11.9 TYPE 2 DIABETES MELLITUS WITHOUT COMPLICATION, WITHOUT LONG-TERM CURRENT USE OF INSULIN (HCC): Primary | ICD-10-CM

## 2023-12-04 ENCOUNTER — OFFICE VISIT (OUTPATIENT)
Dept: INTERNAL MEDICINE CLINIC | Facility: CLINIC | Age: 63
End: 2023-12-04
Payer: COMMERCIAL

## 2023-12-04 VITALS
BODY MASS INDEX: 45.1 KG/M2 | DIASTOLIC BLOOD PRESSURE: 82 MMHG | OXYGEN SATURATION: 94 % | HEART RATE: 92 BPM | HEIGHT: 70 IN | WEIGHT: 315 LBS | SYSTOLIC BLOOD PRESSURE: 142 MMHG | RESPIRATION RATE: 20 BRPM

## 2023-12-04 DIAGNOSIS — E78.2 MIXED HYPERLIPIDEMIA: ICD-10-CM

## 2023-12-04 DIAGNOSIS — E11.9 TYPE 2 DIABETES MELLITUS WITHOUT COMPLICATION, WITHOUT LONG-TERM CURRENT USE OF INSULIN (HCC): Primary | ICD-10-CM

## 2023-12-04 DIAGNOSIS — N17.9 AKI (ACUTE KIDNEY INJURY) (HCC): ICD-10-CM

## 2023-12-04 DIAGNOSIS — E66.01 MORBID OBESITY WITH BMI OF 50.0-59.9, ADULT (HCC): ICD-10-CM

## 2023-12-04 LAB — SL AMB POCT UR MICROALBUMIN: NORMAL

## 2023-12-04 PROCEDURE — 82043 UR ALBUMIN QUANTITATIVE: CPT | Performed by: INTERNAL MEDICINE

## 2023-12-04 PROCEDURE — 99214 OFFICE O/P EST MOD 30 MIN: CPT | Performed by: INTERNAL MEDICINE

## 2023-12-04 PROCEDURE — 82570 ASSAY OF URINE CREATININE: CPT | Performed by: INTERNAL MEDICINE

## 2023-12-04 RX ORDER — ROSUVASTATIN CALCIUM 5 MG/1
5 TABLET, COATED ORAL EVERY EVENING
Qty: 90 TABLET | Refills: 1 | Status: SHIPPED | OUTPATIENT
Start: 2023-12-04 | End: 2024-06-01

## 2023-12-05 ENCOUNTER — OFFICE VISIT (OUTPATIENT)
Dept: SLEEP CENTER | Facility: CLINIC | Age: 63
End: 2023-12-05
Payer: COMMERCIAL

## 2023-12-05 VITALS
DIASTOLIC BLOOD PRESSURE: 84 MMHG | BODY MASS INDEX: 45.1 KG/M2 | SYSTOLIC BLOOD PRESSURE: 132 MMHG | WEIGHT: 315 LBS | HEIGHT: 70 IN

## 2023-12-05 DIAGNOSIS — E66.01 MORBID OBESITY WITH BMI OF 50.0-59.9, ADULT (HCC): ICD-10-CM

## 2023-12-05 DIAGNOSIS — G47.33 OBSTRUCTIVE SLEEP APNEA TREATED WITH CONTINUOUS POSITIVE AIRWAY PRESSURE (CPAP): Primary | ICD-10-CM

## 2023-12-05 PROCEDURE — 99214 OFFICE O/P EST MOD 30 MIN: CPT | Performed by: PHYSICIAN ASSISTANT

## 2023-12-05 NOTE — ASSESSMENT & PLAN NOTE
Patient has gained 20 pounds since his last visit. I did advise him that weight gain can worsen sleep apnea and significant weight loss can improve and potentially even resolve obstructive sleep apnea.

## 2023-12-05 NOTE — ASSESSMENT & PLAN NOTE
Patient has a history of severe obstructive sleep apnea. He uses his CPAP 100% of the time greater than 4 hours 100% the time with a current residual AHI of 7.0. This is an increase from his last visit. He is also had a 20 pound weight gain since his last visit. I made a pressure change today to 18 to 20 cm of H2O. I will pull a compliance report on the patient in a few weeks. If his AHI remains high, I will place an order for a titration study. If his AHI looks good with the pressure increase, he will continue his current treatment and follow-up with Jamir Gillespie in [facility] in 6 months as this is a better location for him. I advised the patient if he does not tolerate the increased pressure setting to please call me sooner and let me know. Order for new supplies was placed today.

## 2023-12-05 NOTE — PATIENT INSTRUCTIONS
I increased your pressure slightly today. It will now be set between 18 to 20 cm of H2O. If you feel this pressure is too strong, please let me know otherwise, I am going to pull a compliance report on you in 3 to 4 weeks and call you and let you know how your results look. If your abnormal breathing events still remain high, I would recommend doing a CPAP study or possibly starting BiPAP. If your breathing events look good with your increased pressure settings, I would recommend continuing and following up with Yaneth Morrison in 6 months. Nursing Support:  When: Monday through Friday 7A-5PM except holidays  Where: Our direct line is [phone]. If you are having a true emergency please call 911. In the event that the line is busy or it is after hours please leave a voice message and we will return your call. Please speak clearly, leaving your full name, birth date, best number to reach you and the reason for your call. Medication refills: We will need the name of the medication, the dosage, the ordering provider, whether you get a 30 or 90 day refill, and the pharmacy name and address. Medications will be ordered by the provider only. Nurses cannot call in prescriptions. Please allow 7 days for medication refills. Physician requested updates: If your provider requested that you call with an update after starting medication, please be ready to provide us the medication and dosage, what time you take your medication, the time you attempt to fall asleep, time you fall asleep, when you wake up, and what time you get out of bed. Sleep Study Results: We will contact you with sleep study results and/or next steps after the physician has reviewed your testing.

## 2023-12-05 NOTE — PROGRESS NOTES
Progress Note - [address] 65 y.o. male   :1960, MRN: [id number]  2023          Follow Up Evaluation / Problem:     Severe Obstructive Sleep Apnea  Morbid Obesity    Thank you for the opportunity of participating in the evaluation and care of this patient in the Sleep Clinic at [address] [place] [address]. HPI: Danny Nicholas is a 65y.o. year old male. The patient presents for follow up of severe obstructive sleep apnea. He was diagnosed many years ago. He had a home sleep study in  to reconfirm his diagnosis and get new CPAP equipment. It confirmed severe obstructive sleep apnea with a GLENYS of 85.8. He is here to review compliance and effectiveness of treatment. Current Outpatient Medications:     fexofenadine (ALLEGRA) 180 MG tablet, Take 1 tablet by mouth daily, Disp: , Rfl:     lisinopril-hydrochlorothiazide (PRINZIDE,ZESTORETIC) 20-12.5 MG per tablet, Take 2 tablets by mouth daily, Disp: 180 tablet, Rfl: 1    metoprolol succinate (TOPROL-XL) 100 mg 24 hr tablet, Take 1 tablet (100 mg total) by mouth daily, Disp: 90 tablet, Rfl: 1    naproxen sodium (ALEVE) 220 MG tablet, Take 1 tablet by mouth every 8 (eight) hours, Disp: , Rfl:     rosuvastatin (CRESTOR) 5 mg tablet, Take 1 tablet (5 mg total) by mouth every evening, Disp: 90 tablet, Rfl: 1    sertraline (ZOLOFT) 100 mg tablet, Take 1.5 tablets (150 mg total) by mouth daily, Disp: 135 tablet, Rfl: 3    tirzepatide 2.5 MG/0.5ML, Inject 0.5 mL (2.5 mg total) under the skin every 7 days, Disp: 2 mL, Rfl: 0    How likely are you to doze off or fall asleep in the following situations, in contrast to feeling just tired? Sitting and reading: Would never doze  Watching TV: Would never doze  Sitting, inactive in a public place (e.g. a theatre or a meeting):  Would never doze  As a passenger in a car for an hour without a break: Would never doze  Lying down to rest in the afternoon when circumstances permit: Would never doze  Sitting and talking to someone: Would never doze  Sitting quietly after a lunch without alcohol: Would never doze  In a car, while stopped for a few minutes in traffic: Would never doze  Total score: 0              Vitals:    12/05/23 1617   BP: 132/84   BP Location: Left arm   Patient Position: Sitting   Cuff Size: Large   Weight: (!) 187 kg (413 lb)   Height: 5' 10" (1.778 m)       Body mass index is 59.26 kg/m². EPWORTH SLEEPINESS SCORE  Total score: 0      Past History Since Last Sleep Center Visit:     Gained 20 pounds since his last visit   Patient states he uses his CPAP every night. He never sleeps without it. He has no significant concerns or complaints today. The patient reports that he cleans the equipment appropriately and changes supplies on a regular basis. The review of systems and following portions of the patient's history were reviewed and updated as appropriate: allergies, current medications, past family history, past medical history, past social history, past surgical history, and problem list.        OBJECTIVE  Equipment set up date:  1/13/22, ResMed S11  PAP Pressure: Nasal AutoPAP using a lower limit of 15 cm and an upper limit of 20 cm of water pressure, increased his pressure today to 18 to 20 cm of H2O  Type of mask used: nasal pillow  DME Provider: Yolanda Machuca  Denies aerophagia or AM headaches    Physical Exam:     General Appearance:   Alert, cooperative, no distress, appears stated age, obese       ASSESSMENT / PLAN    1. Obstructive sleep apnea treated with continuous positive airway pressure (CPAP)  Assessment & Plan:  Patient has a history of severe obstructive sleep apnea. He uses his CPAP 100% of the time greater than 4 hours 100% the time with a current residual AHI of 7.0. This is an increase from his last visit. He is also had a 20 pound weight gain since his last visit.   I made a pressure change today to 18 to 20 cm of H2O. I will pull a compliance report on the patient in a few weeks. If his AHI remains high, I will place an order for a titration study. If his AHI looks good with the pressure increase, he will continue his current treatment and follow-up with Marleny Rivera in [facility] in 6 months as this is a better location for him. I advised the patient if he does not tolerate the increased pressure setting to please call me sooner and let me know. Order for new supplies was placed today. Orders:  -     PAP DME Pressure Change  -     PAP DME Resupply/Reorder    2. Morbid obesity with BMI of 50.0-59.9, adult [facility])  Assessment & Plan:  Patient has gained 20 pounds since his last visit. I did advise him that weight gain can worsen sleep apnea and significant weight loss can improve and potentially even resolve obstructive sleep apnea. Counseling / Coordination of Care  I have spent a total time of 30 minutes on 12/05/23 in caring for this patient including Diagnostic results, Prognosis, Risks and benefits of tx options, Instructions for management, Patient and family education, Importance of tx compliance, Risk factor reductions, Impressions, Counseling / Coordination of care, Documenting in the medical record, Reviewing / ordering tests, medicine, procedures  , and Obtaining or reviewing history  . Today I reviewed the patient's compliance data. he has been able to use the equipment 100% of all days recorded. Average usage was 4 or more hours 100% of all days recorded. The patient uses the equipment for an average of 8 hours and 42 minutes per night. The estimated AHI is 7.0 abnormal breathing events per hour. The patient feels they benefit from the use of PAP equipment and would like to continue PAP therapy. Response to treatment has been good. A prescription for supplies has been provided to last for the next year.     He will continue using this equipment at the settings noted above for the next 6 months. At that timehe will then return for a routine follow-up evaluation. I have asked the patient to contact the Sleep [facility] if he encounters any difficulties prior to that time. The following instructions have been given to the patient today:    Patient Instructions   I increased your pressure slightly today. It will now be set between 18 to 20 cm of H2O. If you feel this pressure is too strong, please let me know otherwise, I am going to pull a compliance report on you in 3 to 4 weeks and call you and let you know how your results look. If your abnormal breathing events still remain high, I would recommend doing a CPAP study or possibly starting BiPAP. If your breathing events look good with your increased pressure settings, I would recommend continuing and following up with Luiza Wharton in 6 months. Nursing Support:  When: Monday through Friday 7A-5PM except holidays  Where: Our direct line is [phone]. If you are having a true emergency please call 911. In the event that the line is busy or it is after hours please leave a voice message and we will return your call. Please speak clearly, leaving your full name, birth date, best number to reach you and the reason for your call. Medication refills: We will need the name of the medication, the dosage, the ordering provider, whether you get a 30 or 90 day refill, and the pharmacy name and address. Medications will be ordered by the provider only. Nurses cannot call in prescriptions. Please allow 7 days for medication refills. Physician requested updates: If your provider requested that you call with an update after starting medication, please be ready to provide us the medication and dosage, what time you take your medication, the time you attempt to fall asleep, time you fall asleep, when you wake up, and what time you get out of bed. Sleep Study Results:  We will contact you with sleep study results and/or next steps after the physician has reviewed your testing.          Claude Neely PA-C  [address]

## 2023-12-06 ENCOUNTER — TELEPHONE (OUTPATIENT)
Dept: INTERNAL MEDICINE CLINIC | Facility: CLINIC | Age: 63
End: 2023-12-06

## 2023-12-06 ENCOUNTER — TELEPHONE (OUTPATIENT)
Dept: SLEEP CENTER | Facility: CLINIC | Age: 63
End: 2023-12-06

## 2023-12-06 NOTE — TELEPHONE ENCOUNTER
Hi, this is Will David. I'm calling from my  Jonathan Lance. His date of birth is [birth year]. We're waiting for [place] [facility] to be approved for him. He saw the doctor on Monday. The pharmacy which is [facility], which is "[employer] still has not received an OK for the [place] [facility] [employer] and they wanted to make sure that it hasn't been denied on your end or you saw it denied something like that. So if you can call me back so I can, then if I have to go to the insurance company because I am the person who handles that, I will. So it's [phone], [facility] for Jonathan Lance. Thank you.  toni Romo.

## 2023-12-07 ENCOUNTER — TELEPHONE (OUTPATIENT)
Dept: INTERNAL MEDICINE CLINIC | Facility: CLINIC | Age: 63
End: 2023-12-07

## 2023-12-07 DIAGNOSIS — E11.9 TYPE 2 DIABETES MELLITUS WITHOUT COMPLICATION, WITHOUT LONG-TERM CURRENT USE OF INSULIN (HCC): Primary | ICD-10-CM

## 2023-12-07 NOTE — TELEPHONE ENCOUNTER
Vangie,wife, asked about the tirzepatied. Per previous message it was denied. Is there something else that will be sent in? Please advise. ..     Call back #[phone]  Vangie/wife

## 2023-12-07 NOTE — TELEPHONE ENCOUNTER
Hi, this is Leann Wilson calling. I spoke to [employer] division because I am a . So I was able to get my way into that division and they are saying that they have nothing on my  for prior authorization for the [facility]. And they said that you have to reach out to them with another prior authorization if you're saying you have one that has a denial on it. They said they have nothing. So they said it has to be filled out and faxed back to the number at the bottom of the form because in the meantime, we're we're just swimming around here doing nothing. So they're saying they don't have it. You're saying it's already been denied and we're caught in the middle. So if we can resolve this, that would be wonderful. Like I said, I did actually speak with Rafia West in the [address] for [employer] and they're saying my  who is Dian Hdz [birth year] birth date, there's nothing for him for pre authorization for [place] [facility]. OK, thank you. My phone number is [phone]. Thank you.       A denial is in the chart,  ??

## 2023-12-08 ENCOUNTER — TELEPHONE (OUTPATIENT)
Dept: INTERNAL MEDICINE CLINIC | Facility: CLINIC | Age: 63
End: 2023-12-08

## 2023-12-08 DIAGNOSIS — E11.9 TYPE 2 DIABETES MELLITUS WITHOUT COMPLICATION, WITHOUT LONG-TERM CURRENT USE OF INSULIN (HCC): Primary | ICD-10-CM

## 2023-12-08 LAB

## 2023-12-08 NOTE — TELEPHONE ENCOUNTER
Karen Hernandez, wife, called states she is an  and Kimberlee Kern is stating they never received a prior authorization. She would like to know if the monjouro can be re-sent and then Kimberlee Kern will send another form for a prior authorization. Please advise. .    Call back #[phone]

## 2023-12-08 NOTE — TELEPHONE ENCOUNTER
[facility] did send a prior auth yesterday morning for patients mounjaro, which we processed. Dr. Jimy Em discussed with patient at his visit if this was denied they would try [employer]. Will recheck the faxes this morning and see if [employer] resent mounjaro prior auth and will resubmit if this if what the patient is requesting.

## 2023-12-08 NOTE — TELEPHONE ENCOUNTER
PA [place] through [employer] portal [place] [facility] [place]. We will await auth status before sending in new rx in case this is denied. Your PA has been faxed to the plan as a paper copy. Please contact the plan directly if you haven't received a determination in a typical timeframe. You will be notified of the determination via fax.     PA Key: [id number]

## 2023-12-15 ENCOUNTER — TELEPHONE (OUTPATIENT)
Dept: DIABETES SERVICES | Facility: CLINIC | Age: 63
End: 2023-12-15

## 2023-12-15 DIAGNOSIS — E11.9 TYPE 2 DIABETES MELLITUS WITHOUT COMPLICATION, WITHOUT LONG-TERM CURRENT USE OF INSULIN (HCC): Primary | ICD-10-CM

## 2023-12-15 NOTE — TELEPHONE ENCOUNTER
Pt's wife requested an appt for patient for dm education. Please issue a referral if you feel appropriate.  Thanks

## 2024-01-16 DIAGNOSIS — E11.9 TYPE 2 DIABETES MELLITUS WITHOUT COMPLICATION, WITHOUT LONG-TERM CURRENT USE OF INSULIN (HCC): Primary | ICD-10-CM

## 2024-01-24 ENCOUNTER — OFFICE VISIT (OUTPATIENT)
Dept: DIABETES SERVICES | Facility: CLINIC | Age: 64
End: 2024-01-24
Payer: COMMERCIAL

## 2024-01-24 VITALS — BODY MASS INDEX: 58.37 KG/M2 | WEIGHT: 315 LBS

## 2024-01-24 DIAGNOSIS — E11.9 TYPE 2 DIABETES MELLITUS WITHOUT COMPLICATION, WITHOUT LONG-TERM CURRENT USE OF INSULIN (HCC): Primary | ICD-10-CM

## 2024-01-24 PROCEDURE — 97802 MEDICAL NUTRITION INDIV IN: CPT

## 2024-01-24 NOTE — PROGRESS NOTES
Medical Nutrition Therapy        Assessment    Visit Type: Initial visit  Chief complaint/Medical Diagnosis/reason for visit E11.9    HPI Lizandro was seen in person for the initial MNT appointment, accompanied by his spouse, Vangie. Patient was pleasant and willing to share assessment information. BG levels are checked 1x/day. FBG in the morning is usually  mg/dL. Patient does take diabetes medication as prescribed.     Patient reported no current exercise regimen. Explained how exercise lowers BG levels by creating more demand for glucose in the muscle cells. Will discuss an addition of a routine at the follow-up session.    Lizandro has achieved a 7 lb weight reduction in less than 2 months.    Food-dubois, Lizandro is trying to follow a keto diet. Discussed potential risks associated with the diet. Also discussed energy balanced and how that works including deficit and excess energy.      Problems identified in food recall include meal skipping, inadequate carbohydrate intake, high saturated fat foods, limited non-starchy vegetables and whole grains, sugary beverages, and sweets. Consumption of carbohydrates ranges from 0 to <15 grams per meal. Explained basic pathophysiology of diabetes and impact of diet on blood glucose levels and disease complications. Explained how sodium influences volume retention, blood pressure, and complications for heart disease, stroke, and CKD.     Provided patient with a 2466 calorie meal plan to assist with consistency, balance and portion control.  Encouraged the consumption of regular meals at regular times.  Advised patient to keep carbohydrate intake to 60 grams per meal and 15-30 per snack to assist with glycemic control.  Suggested keeping protein intake to 10 ounces a day and fat to 6 servings daily to assist with lipid management and calorie control. Portion booklet and food labels were used to teach basic carbohydrate counting. Patient agreed to keep daily food logs and return  "them in 2 months for assessment. RD will remain available for further dietary questions/concerns.     Ht Readings from Last 1 Encounters:   12/05/23 5' 10\" (1.778 m)     Wt Readings from Last 3 Encounters:   12/05/23 (!) 187 kg (413 lb)   12/04/23 (!) 188 kg (413 lb 12.8 oz)   09/28/23 (!) 189 kg (416 lb 12.8 oz)     Weight Change: Yes 7 lbs reduction in less than 2 months    Barriers to Learning: no barriers    Do you follow any special diet presently?: Yes - keto or low carb, more meat  Who shops: spouse  Who cooks: spouse    Food Log: Completed via the method of food recall    Wakes up 7-7:30 am    Breakfast:8 am; skips; 3 eggs with 1 1/2 cheese with meat (pork fierro or pork roll or spam or scrapple) made with fierro fat with coffee (no sugar; regular; milk 2%)    Brunch 10-11 am; eggs with fierro and cheese with coffee with orange juice (2 oz with 6 oz water)  Morning Snack: 12 pm; 1-2 avocado or about 8 oz pork rinds  Lunch:1-2 pm; can of chicken or tuna or sardines or salmon with 2-3 avocado OR pork roast or beef roast with lemonade mixed with water or hamburger  Afternoon Snack: 5 pm; pork rinds or avocados  Dinner:7-7:30 pm; smash burger (80/20) with lettuce, tomato, russian dressing OR cube steak OR kielbasa with sauerkraut OR ribs with coleslaw (w/ sugar)  Evening Snack: 8:30-9 pm; TwoGood yogurt  Beverages: Orange juice mixed with water, coffee w/ 2% milk; lemonade with water, water  Eating out/Take out:none  Exercise none    Calorie needs 2466 kcals/day Carbs: 60 g/meal, 15-30 g/snack     Protein:10 ounces/day    Fat: 6 servings/day        Nutrition Diagnosis:  Food and nutrition related knowledge deficit  related to Lack of prior exposure to accurate nutrition related information as evidenced by Verbalizes inaccurate or incomplete information    Intervention: plate method, reduced fat intake, increased fiber intake, label reading, carbohydrate counting, increased plant based foods, meal timing, meal " planning, exercise guidelines, and food diary     Treatment Goals: Patient understands education and recommendations, Patient will monitor food intake daily with tracker, Patient will monitor fat intake, Patient will consume 3 meals a day, Patient will increase their intake of plant based foods, Patient will count carbohydrates, Patient will exercise, and Patient will monitor blood glucose    Monitoring and evaluation:    Term code indicator  FH 1.3.2 Food Intake Criteria: Eat 3 meals per day, 4-5 hours apart. No meal skipping.   Term code indicator  FH 1.6.3 Carbohydrate Intake Criteria: Eat 60 grams of carbohydrate per meal and 15 grams per snack.  Term code indicator  CH 2.2 Treatments/Therapy/Alternative Medicine Criteria: Initiate regular exercise to build to at least 30 minutes per day or 150 minutes of moderate exercise per week, and 2 days per week of resistance/strength training, pending physician approval.     Materials Provided: portion book, 3-day food log    Patient’s Response to Instruction:  Comprehensiongood  Motivationgood  Expected Compliancegood    Begin Time: 9:00 am  End Time: 10:01 am  Referring Provider: Javy Ronquillo MD    Thank you for coming to the [facility] for education today.  Please feel free to call with any questions or concerns.    Gena Combs, RD  [address] [place] [id number] [phone]

## 2024-01-24 NOTE — PATIENT INSTRUCTIONS
Eat 3 meals per day, 4-5 hours apart. No meal skipping.     Eat 60 grams of carbohydrate per meal and 15 grams per snack.    Initiate regular exercise to build to at least 30 minutes per day or 150 minutes of moderate exercise per week, and 2 days per week of resistance/strength training, pending physician approval.     Complete 3-day food log and return completed log at the follow up appointment

## 2024-03-04 ENCOUNTER — APPOINTMENT (OUTPATIENT)
Dept: LAB | Facility: CLINIC | Age: 64
End: 2024-03-04
Payer: COMMERCIAL

## 2024-03-04 ENCOUNTER — OFFICE VISIT (OUTPATIENT)
Dept: INTERNAL MEDICINE CLINIC | Facility: CLINIC | Age: 64
End: 2024-03-04
Payer: COMMERCIAL

## 2024-03-04 VITALS
DIASTOLIC BLOOD PRESSURE: 74 MMHG | WEIGHT: 315 LBS | SYSTOLIC BLOOD PRESSURE: 118 MMHG | TEMPERATURE: 99.7 F | OXYGEN SATURATION: 94 % | BODY MASS INDEX: 45.1 KG/M2 | HEART RATE: 86 BPM | HEIGHT: 70 IN | RESPIRATION RATE: 18 BRPM

## 2024-03-04 DIAGNOSIS — N17.9 AKI (ACUTE KIDNEY INJURY) (HCC): ICD-10-CM

## 2024-03-04 DIAGNOSIS — I10 ESSENTIAL HYPERTENSION: ICD-10-CM

## 2024-03-04 DIAGNOSIS — E11.9 TYPE 2 DIABETES MELLITUS WITHOUT COMPLICATION, WITHOUT LONG-TERM CURRENT USE OF INSULIN (HCC): Primary | ICD-10-CM

## 2024-03-04 PROBLEM — G47.33 OBSTRUCTIVE SLEEP APNEA: Status: ACTIVE | Noted: 2017-12-11

## 2024-03-04 PROBLEM — F41.9 ANXIETY: Status: ACTIVE | Noted: 2024-03-04

## 2024-03-04 PROBLEM — E78.2 MIXED HYPERLIPIDEMIA: Status: ACTIVE | Noted: 2017-12-11

## 2024-03-04 PROBLEM — E66.01 MORBID OBESITY DUE TO EXCESS CALORIES (HCC): Status: ACTIVE | Noted: 2017-12-11

## 2024-03-04 LAB
ALBUMIN SERPL BCP-MCNC: 4.5 G/DL (ref 3.5–5)
ALP SERPL-CCNC: 68 U/L (ref 34–104)
ALT SERPL W P-5'-P-CCNC: 65 U/L (ref 7–52)
ANION GAP SERPL CALCULATED.3IONS-SCNC: 9 MMOL/L
AST SERPL W P-5'-P-CCNC: 36 U/L (ref 13–39)
BILIRUB SERPL-MCNC: 0.52 MG/DL (ref 0.2–1)
BUN SERPL-MCNC: 40 MG/DL (ref 5–25)
CALCIUM SERPL-MCNC: 10 MG/DL (ref 8.4–10.2)
CHLORIDE SERPL-SCNC: 100 MMOL/L (ref 96–108)
CO2 SERPL-SCNC: 28 MMOL/L (ref 21–32)
CREAT SERPL-MCNC: 1.8 MG/DL (ref 0.6–1.3)
GFR SERPL CREATININE-BSD FRML MDRD: 39 ML/MIN/1.73SQ M
GLUCOSE P FAST SERPL-MCNC: 93 MG/DL (ref 65–99)
LEFT EYE DIABETIC RETINOPATHY: NORMAL
LEFT EYE IMAGE QUALITY: NORMAL
LEFT EYE MACULAR EDEMA: NORMAL
LEFT EYE OTHER RETINOPATHY: NORMAL
POTASSIUM SERPL-SCNC: 4.6 MMOL/L (ref 3.5–5.3)
PROT SERPL-MCNC: 7.5 G/DL (ref 6.4–8.4)
RIGHT EYE DIABETIC RETINOPATHY: NORMAL
RIGHT EYE IMAGE QUALITY: NORMAL
RIGHT EYE MACULAR EDEMA: NORMAL
RIGHT EYE OTHER RETINOPATHY: NORMAL
SEVERITY (EYE EXAM): NORMAL
SL AMB POCT HEMOGLOBIN AIC: 6 (ref ?–6.5)
SODIUM SERPL-SCNC: 137 MMOL/L (ref 135–147)

## 2024-03-04 PROCEDURE — 83036 HEMOGLOBIN GLYCOSYLATED A1C: CPT | Performed by: INTERNAL MEDICINE

## 2024-03-04 PROCEDURE — 36415 COLL VENOUS BLD VENIPUNCTURE: CPT

## 2024-03-04 PROCEDURE — 99214 OFFICE O/P EST MOD 30 MIN: CPT | Performed by: INTERNAL MEDICINE

## 2024-03-04 PROCEDURE — 92250 FUNDUS PHOTOGRAPHY W/I&R: CPT | Performed by: INTERNAL MEDICINE

## 2024-03-04 PROCEDURE — 80053 COMPREHEN METABOLIC PANEL: CPT

## 2024-03-04 RX ORDER — LISINOPRIL AND HYDROCHLOROTHIAZIDE 20; 12.5 MG/1; MG/1
1 TABLET ORAL DAILY
Status: SHIPPED
Start: 2024-03-04 | End: 2024-03-14 | Stop reason: SDUPTHER

## 2024-03-04 NOTE — PROGRESS NOTES
Assessment/Plan:     Type 2 diabetes is better controlled.  A1c 6 from 6.9.  Has had some weight loss but not enough.  Increase Mounjaro to 5 mg.  Call us in 1 month to let us know how you are doing.  Plan to increase to 7.5.    DIANE on labs, needs to get repeat CMP done.  Ordered today.  For now decrease lisinopril hydrochlorothiazide.  Please stop naproxen.  This can affect your kidney function.  Discussed increasing fluid intake.       Quality Measures:     Depression Screening and Follow-up Plan: Patient was screened for depression during today's encounter. They screened negative with a PHQ-2 score of 0.Clincally patient does not have depression. No treatment is required.          Return in about 3 months (around 6/4/2024).    No problem-specific Assessment & Plan notes found for this encounter.       Diagnoses and all orders for this visit:    Type 2 diabetes mellitus without complication, without long-term current use of insulin ([facility])  -     POCT hemoglobin A1c  -     tirzepatide (Mounjaro) 2.5 MG/0.5ML; Inject 1 mL (5 mg total) under the skin every 7 days for 4 doses  -     IRIS Diabetic eye exam    DIANE (acute kidney injury) ([facility])  -     Comprehensive metabolic panel; Future    Essential hypertension  -     lisinopril-hydrochlorothiazide (PRINZIDE,ZESTORETIC) 20-12.5 MG per tablet; Take 1 tablet by mouth daily          Subjective:      Patient ID: Lizandro Sanchez is a 63 y.o. male.    Patient is here for routine follow up, reviewed chronic medical problems, ordered labs for next visit including CBC CMP TSH A1C LIPID. Reviewed labs for this visit.        ALLERGIES:  Allergies   Allergen Reactions    Hydromorphone Other (See Comments)     hypotension  hypotension       CURRENT MEDICATIONS:    Current Outpatient Medications:     fexofenadine (ALLEGRA) 180 MG tablet, Take 1 tablet by mouth daily, Disp: , Rfl:     lisinopril-hydrochlorothiazide (PRINZIDE,ZESTORETIC) 20-12.5 MG per tablet, Take 1 tablet by mouth  daily, Disp: , Rfl:     metoprolol succinate (TOPROL-XL) 100 mg 24 hr tablet, Take 1 tablet (100 mg total) by mouth daily, Disp: 90 tablet, Rfl: 1    rosuvastatin (CRESTOR) 5 mg tablet, Take 1 tablet (5 mg total) by mouth every evening, Disp: 90 tablet, Rfl: 1    sertraline (ZOLOFT) 100 mg tablet, Take 1.5 tablets (150 mg total) by mouth daily, Disp: 135 tablet, Rfl: 3    tirzepatide (Mounjaro) 2.5 MG/0.5ML, Inject 1 mL (5 mg total) under the skin every 7 days for 4 doses, Disp: 4 mL, Rfl: 0    ACTIVE PROBLEM LIST:  Patient Active Problem List   Diagnosis    Essential hypertension    Morbid obesity with BMI of 50.0-59.9, adult (HCC)    Obstructive sleep apnea    Type 2 diabetes mellitus without complication, without long-term current use of insulin (HCC)    Morbid obesity due to excess calories (HCC)    Mixed hyperlipidemia    Anxiety       PAST MEDICAL HISTORY:  Past Medical History:   Diagnosis Date    Anxiety     CPAP (continuous positive airway pressure) dependence     Hypertension     Sleep apnea     Venous stasis ulcers of both lower extremities (HCC)        PAST SURGICAL HISTORY:  Past Surgical History:   Procedure Laterality Date    HERNIA REPAIR      umbilical    RI ENDOVEN ABLTJ INCMPTNT VEIN XTR LASER 1ST VEIN Left 8/12/2021    Procedure: ENDOVASCULAR LASER THERAPY (EVLT), Left GSV;  Surgeon: Francesco Dillard MD;  Location: [facility];  Service: Vascular    TONSILLECTOMY         FAMILY HISTORY:  History reviewed. No pertinent family history.    SOCIAL HISTORY:  Social History     Socioeconomic History    Marital status: /Civil Union     Spouse name: Not on file    Number of children: Not on file    Years of education: Not on file    Highest education level: Not on file   Occupational History    Not on file   Tobacco Use    Smoking status: Never    Smokeless tobacco: Never   Vaping Use    Vaping status: Never Used   Substance and Sexual Activity    Alcohol use: Yes     Alcohol/week: 3.0 standard  "drinks of alcohol     Types: 2 Cans of beer, 1 Shots of liquor per week     Comment: daily    Drug use: Never    Sexual activity: Yes   Other Topics Concern    Not on file   Social History Narrative    Not on file     Social Determinants of Health     Financial Resource Strain: Not on file   Food Insecurity: Not on file   Transportation Needs: Not on file   Physical Activity: Not on file   Stress: Not on file   Social Connections: Not on file   Intimate Partner Violence: Not on file   Housing Stability: Not on file       Review of Systems   Constitutional:  Negative for chills and fever.   HENT:  Negative for ear pain and sore throat.    Eyes:  Negative for pain and visual disturbance.   Respiratory:  Negative for cough and shortness of breath.    Cardiovascular:  Negative for chest pain and palpitations.   Gastrointestinal:  Negative for abdominal pain and vomiting.   Genitourinary:  Negative for dysuria and hematuria.   Musculoskeletal:  Negative for arthralgias and back pain.   Skin:  Negative for color change and rash.   Neurological:  Negative for seizures and syncope.   All other systems reviewed and are negative.        Objective:  Vitals:    03/04/24 0859   BP: 118/74   BP Location: Right arm   Patient Position: Sitting   Cuff Size: Standard   Pulse: 86   Resp: 18   Temp: 99.7 °F (37.6 °C)   TempSrc: Tympanic   SpO2: 94%   Weight: (!) 180 kg (397 lb 9.6 oz)   Height: 5' 10\" (1.778 m)     Body mass index is 57.05 kg/m².     Physical Exam  Vitals and nursing note reviewed.   Constitutional:       Appearance: Normal appearance. He is obese.   HENT:      Head: Normocephalic and atraumatic.   Cardiovascular:      Rate and Rhythm: Normal rate.   Pulmonary:      Effort: Pulmonary effort is normal.   Musculoskeletal:         General: Normal range of motion.      Cervical back: Normal range of motion.   Skin:     General: Skin is warm and dry.   Neurological:      General: No focal deficit present.      Mental " Status: He is alert and oriented to person, place, and time. Mental status is at baseline.   Psychiatric:         Mood and Affect: Mood normal.           RESULTS:  Hemoglobin A1C   Date/Time Value Ref Range Status   03/04/2024 09:33 AM 6.0 6.5 Final   11/24/2023 09:15 AM 6.9 (H) Normal 4.0-5.6%; PreDiabetic 5.7-6.4%; Diabetic >=6.5%; Glycemic control for adults with diabetes <7.0% % Final     Cholesterol   Date/Time Value Ref Range Status   11/24/2023 09:15  See Comment mg/dL Final     Comment:     Cholesterol:         Pediatric <18 Years        Desirable          <170 mg/dL      Borderline High    170-199 mg/dL      High               >=200 mg/dL        Adult >=18 Years            Desirable        <200 mg/dL      Borderline High  200-239 mg/dL      High             >239 mg/dL       Triglycerides   Date/Time Value Ref Range Status   11/24/2023 09:15  See Comment mg/dL Final     Comment:     Triglyceride:     0-9Y            <75mg/dL     10Y-17Y         <90 mg/dL       >=18Y     Normal          <150 mg/dL     Borderline High 150-199 mg/dL     High            200-499 mg/dL        Very High       >499 mg/dL    Specimen collection should occur prior to Metamizole administration due to the potential for falsely depressed results.     HDL, Direct   Date/Time Value Ref Range Status   11/24/2023 09:15 AM 40 >=40 mg/dL Final     LDL Calculated   Date/Time Value Ref Range Status   11/24/2023 09:15 AM 48 0 - 100 mg/dL Final     Comment:     LDL Cholesterol:     Optimal           <100 mg/dl     Near Optimal      100-129 mg/dl     Above Optimal       Borderline High 130-159 mg/dl       High            160-189 mg/dl       Very High       >189 mg/dl         This screening LDL is a calculated result.   It does not have the accuracy of the Direct Measured LDL in the monitoring of patients with hyperlipidemia and/or statin therapy.   Direct Measure LDL (FIM735) must be ordered separately in these patients.     Hemoglobin    Date/Time Value Ref Range Status   11/24/2023 09:15 AM 14.0 12.0 - 17.0 g/dL Final     Hematocrit   Date/Time Value Ref Range Status   11/24/2023 09:15 AM 44.6 36.5 - 49.3 % Final     Platelets   Date/Time Value Ref Range Status   11/24/2023 09:15  149 - 390 Thousands/uL Final     Prostate Specific Antigen Total   Date/Time Value Ref Range Status   11/24/2023 09:15 AM 1.2 0.0 - 4.0 ng/mL Final     Comment:     Roche ECLIA methodology.  According to the American Urological Association, Serum PSA should  decrease and remain at undetectable levels after radical  prostatectomy. The AUA defines biochemical recurrence as an initial  PSA value 0.2 ng/mL or greater followed by a subsequent confirmatory  PSA value 0.2 ng/mL or greater.  Values obtained with different assay methods or kits cannot be used  interchangeably. Results cannot be interpreted as absolute evidence  of the presence or absence of malignant disease.     TSH 3RD GENERATON   Date/Time Value Ref Range Status   11/24/2023 09:15 AM 2.217 0.450 - 4.500 uIU/mL Final     Comment:     Adult TSH (3rd generation) reference range follows the recommended guidelines of the American Thyroid Association, January, 2020.     Sodium   Date/Time Value Ref Range Status   11/24/2023 09:15  135 - 147 mmol/L Final     BUN   Date/Time Value Ref Range Status   11/24/2023 09:15 AM 22 5 - 25 mg/dL Final     Creatinine   Date/Time Value Ref Range Status   11/24/2023 09:15 AM 1.69 (H) 0.60 - 1.30 mg/dL Final     Comment:     Standardized to IDMS reference method      In chart    This note was created with voice recognition software.  Phonic, grammatical and spelling errors may be present within the note as a result.

## 2024-03-05 ENCOUNTER — TELEPHONE (OUTPATIENT)
Dept: INTERNAL MEDICINE CLINIC | Facility: CLINIC | Age: 64
End: 2024-03-05

## 2024-03-05 NOTE — TELEPHONE ENCOUNTER
----- Message from Javy Ronquillo MD sent at 3/5/2024  9:29 AM EST -----  Please stop lisinopril hctz, continue metoprolol, stop and hold all nsaids like naproxen motrin  Lets recheck bmp in 1 week  He should be drinking 64 oz water daily  If it doesn't normalize- see nephrology

## 2024-03-12 DIAGNOSIS — I10 ESSENTIAL HYPERTENSION: ICD-10-CM

## 2024-03-12 RX ORDER — METOPROLOL SUCCINATE 100 MG/1
100 TABLET, EXTENDED RELEASE ORAL DAILY
Qty: 90 TABLET | Refills: 1 | Status: SHIPPED | OUTPATIENT
Start: 2024-03-12 | End: 2024-06-10

## 2024-03-14 DIAGNOSIS — I10 ESSENTIAL HYPERTENSION: ICD-10-CM

## 2024-03-14 DIAGNOSIS — E11.9 TYPE 2 DIABETES MELLITUS WITHOUT COMPLICATION, WITHOUT LONG-TERM CURRENT USE OF INSULIN (HCC): ICD-10-CM

## 2024-03-14 RX ORDER — LISINOPRIL AND HYDROCHLOROTHIAZIDE 20; 12.5 MG/1; MG/1
1 TABLET ORAL DAILY
Qty: 90 TABLET | Refills: 3 | Status: SHIPPED | OUTPATIENT
Start: 2024-03-14

## 2024-03-14 NOTE — TELEPHONE ENCOUNTER
Pharmacy called asking if we can write the script for 5mg/0.5mL as they cannot get 2 boxes of the 2.5mg dosage.

## 2024-04-01 DIAGNOSIS — E11.9 TYPE 2 DIABETES MELLITUS WITHOUT COMPLICATION, WITHOUT LONG-TERM CURRENT USE OF INSULIN (HCC): ICD-10-CM

## 2024-04-01 NOTE — TELEPHONE ENCOUNTER
Pharmacy requesting a refill on tirzepatide 5mg/0.5ml, inject 0.5 mL (5 mg total) under the skin every 7 days.     [facility]    Call back #[phone]  [facility]

## 2024-04-24 ENCOUNTER — TELEPHONE (OUTPATIENT)
Age: 64
End: 2024-04-24

## 2024-04-24 NOTE — TELEPHONE ENCOUNTER
Wife called stating pt is on Mounjaro but the pharmacy is all out of the 5.0 mg dose pt is currently taking.  Pharmacy does have the 2.5 mg and the 7.5 mg dose.  Asking if he can increase to the 7.5 mg.  If so, please send to [facility].

## 2024-04-25 DIAGNOSIS — E11.9 TYPE 2 DIABETES MELLITUS WITHOUT COMPLICATION, WITHOUT LONG-TERM CURRENT USE OF INSULIN (HCC): ICD-10-CM

## 2024-04-25 NOTE — TELEPHONE ENCOUNTER
Wife called back and stated pt has been taking the 5 mg Mounjaro for appx 2 weeks.  The medication is on back order at the pharmacy.  Pt was not actually able to get the 5 mg prescription at all but has been sharing his wife's in the meantime.  Asking for  new prescription for 7.5 mg as that is available in the pharmacy.

## 2024-04-29 DIAGNOSIS — E11.9 TYPE 2 DIABETES MELLITUS WITHOUT COMPLICATION, WITHOUT LONG-TERM CURRENT USE OF INSULIN (HCC): ICD-10-CM

## 2024-04-29 NOTE — TELEPHONE ENCOUNTER
Pt's wife called requesting new monjouro script to be sent in for the 7.5 as the 5 is not available.     [facility]

## 2024-04-30 ENCOUNTER — TELEPHONE (OUTPATIENT)
Age: 64
End: 2024-04-30

## 2024-04-30 NOTE — TELEPHONE ENCOUNTER
PA for tirzepatide (Mounjaro) 7.5 MG/0.5ML     Submitted via    []CMM-KEY   []SurescriMySupportAssistant-Case ID #   []Faxed to plan   [x]Other website -ZnfcdqZX-Oxzdbsnyi-MI: [id number]  []Phone call Case ID #     Office notes sent, clinical questions answered. Awaiting determination    Turnaround time for your insurance to make a decision on your Prior Authorization can take 7-21 business days.

## 2024-05-07 DIAGNOSIS — E11.9 TYPE 2 DIABETES MELLITUS WITHOUT COMPLICATION, WITHOUT LONG-TERM CURRENT USE OF INSULIN (HCC): ICD-10-CM

## 2024-05-07 NOTE — TELEPHONE ENCOUNTER
Kandis from [facility] pharmacy [phone] need the script for moujaro 7.5 mg to be sent spoke to griffin at the office she is going to also put in a message

## 2024-05-09 NOTE — TELEPHONE ENCOUNTER
PA for tirzepatide (Mounjaro) 7.5 MG/0.5ML cancelled due to     [x]Approval on file-dates approved 12/8/23-12/31/99  []Medication already on Formulary  []Brand Name Preferred  []Patient no longer covered by insurance    Patient advised by     []My Chart Message  []Phone call    Message sent to office clinical pool   No      Scanned into Media  No

## 2024-05-15 DIAGNOSIS — F41.9 ANXIETY: ICD-10-CM

## 2024-05-15 RX ORDER — SERTRALINE HYDROCHLORIDE 100 MG/1
150 TABLET, FILM COATED ORAL DAILY
Qty: 135 TABLET | Refills: 3 | Status: SHIPPED | OUTPATIENT
Start: 2024-05-15

## 2024-05-31 DIAGNOSIS — E11.9 TYPE 2 DIABETES MELLITUS WITHOUT COMPLICATION, WITHOUT LONG-TERM CURRENT USE OF INSULIN (HCC): ICD-10-CM

## 2024-05-31 NOTE — TELEPHONE ENCOUNTER
Reason for call:   [x] Refill   [] Prior Auth  [] Other:     Office:   [x] PCP/Provider -   [] Specialty/Provider -     Medication:     tirzepatide (Mounjaro) 7.5 MG/0.5ML       Dose/Frequency: 7.5 mg, Subcutaneous, Every 7 days     Quantity: 2ml    Pharmacy: [id number] - [place] [address] - [facility] [address]     Does the patient have enough for 3 days?   [] Yes   [x] No - Send as HP to POD

## 2024-06-18 ENCOUNTER — OFFICE VISIT (OUTPATIENT)
Dept: INTERNAL MEDICINE CLINIC | Facility: CLINIC | Age: 64
End: 2024-06-18
Payer: COMMERCIAL

## 2024-06-18 VITALS
WEIGHT: 315 LBS | DIASTOLIC BLOOD PRESSURE: 86 MMHG | SYSTOLIC BLOOD PRESSURE: 138 MMHG | OXYGEN SATURATION: 95 % | HEIGHT: 70 IN | BODY MASS INDEX: 45.1 KG/M2 | HEART RATE: 81 BPM | RESPIRATION RATE: 18 BRPM | TEMPERATURE: 99.2 F

## 2024-06-18 DIAGNOSIS — E11.9 TYPE 2 DIABETES MELLITUS WITHOUT COMPLICATION, WITHOUT LONG-TERM CURRENT USE OF INSULIN (HCC): ICD-10-CM

## 2024-06-18 DIAGNOSIS — I10 ESSENTIAL HYPERTENSION: ICD-10-CM

## 2024-06-18 DIAGNOSIS — F41.9 ANXIETY: ICD-10-CM

## 2024-06-18 DIAGNOSIS — E11.9 TYPE 2 DIABETES MELLITUS WITHOUT COMPLICATION, WITHOUT LONG-TERM CURRENT USE OF INSULIN (HCC): Primary | ICD-10-CM

## 2024-06-18 DIAGNOSIS — G47.33 OBSTRUCTIVE SLEEP APNEA: ICD-10-CM

## 2024-06-18 PROCEDURE — 99214 OFFICE O/P EST MOD 30 MIN: CPT | Performed by: INTERNAL MEDICINE

## 2024-06-18 NOTE — ASSESSMENT & PLAN NOTE
Much better controlled. He has lost significant weight. Continue mounjaro. Refills also sent.   Lab Results   Component Value Date    HGBA1C 6.0 03/04/2024

## 2024-06-18 NOTE — PROGRESS NOTES
Ambulatory Visit  Name: Lizandro Sanchez      : 1960      MRN: [id number]  Encounter Provider: Javy Ronquillo MD  Encounter Date: 2024   Encounter department: [facility] INTERNAL MEDICINE [place]    Assessment & Plan     1. Type 2 diabetes mellitus without complication, without long-term current use of insulin (HCC)  Assessment & Plan:  Much better controlled. He has lost significant weight. Continue mounjaro. Refills also sent.   Lab Results   Component Value Date    HGBA1C 6.0 2024     Orders:  -     tirzepatide (Mounjaro) 7.5 MG/0.5ML; Inject 0.5 mL (7.5 mg total) under the skin every 7 days  -     Albumin / creatinine urine ratio; Future; Expected date: 2024  -     Comprehensive metabolic panel; Future; Expected date: 2024  -     Hemoglobin A1C; Future; Expected date: 2024  -     Lipid Panel with Direct LDL reflex; Future; Expected date: 2024  -     TSH, 3rd generation with Free T4 reflex; Future; Expected date: 2024  -     CBC and differential; Future; Expected date: 2024  2. Anxiety  Assessment & Plan:  Continue zoloft. Symptoms controlled.    3. Essential hypertension  Assessment & Plan:  Better controlled at home. We stopped lisinopril-hctz 2/2 abnormal GFR.  Continue BB.  4. Obstructive sleep apnea  Assessment & Plan:  Continue use of CPAP. Continue sleep medicine follow up.      Depression Screening and Follow-up Plan: Clincally patient does not have depression. No treatment is required.       History of Present Illness     Patient is here for routine follow up, reviewed chronic medical problems, ordered labs for next visit including CBC CMP TSH A1C LIPID. Reviewed labs for this visit.        Review of Systems   Constitutional:  Negative for chills and fever.   HENT:  Negative for ear pain and sore throat.    Eyes:  Negative for pain and visual disturbance.   Respiratory:  Negative for cough and shortness of breath.    Cardiovascular:   Negative for chest pain and palpitations.   Gastrointestinal:  Negative for abdominal pain and vomiting.   Genitourinary:  Negative for dysuria and hematuria.   Musculoskeletal:  Negative for arthralgias and back pain.   Skin:  Negative for color change and rash.   Neurological:  Negative for seizures and syncope.   All other systems reviewed and are negative.    Past Medical History   Past Medical History:   Diagnosis Date    Anxiety     CPAP (continuous positive airway pressure) dependence     Hypertension     Sleep apnea     Venous stasis ulcers of both lower extremities (HCC)      Past Surgical History:   Procedure Laterality Date    HERNIA REPAIR      umbilical    [place] ENDOVEN ABLTJ INCMPTNT VEIN XTR LASER 1ST VEIN Left 8/12/2021    Procedure: ENDOVASCULAR LASER THERAPY (EVLT), Left GSV;  Surgeon: Francesco Dillard MD;  Location: [facility];  Service: Vascular    TONSILLECTOMY       History reviewed. No pertinent family history.  Current Outpatient Medications on File Prior to Visit   Medication Sig Dispense Refill    metoprolol succinate (TOPROL-XL) 100 mg 24 hr tablet Take 1 tablet (100 mg total) by mouth daily 90 tablet 1    sertraline (ZOLOFT) 100 mg tablet Take 1.5 tablets (150 mg total) by mouth daily 135 tablet 3    [DISCONTINUED] fexofenadine (ALLEGRA) 180 MG tablet Take 1 tablet by mouth daily (Patient not taking: Reported on 6/18/2024)      [DISCONTINUED] lisinopril-hydrochlorothiazide (PRINZIDE,ZESTORETIC) 20-12.5 MG per tablet Take 1 tablet by mouth daily (Patient not taking: Reported on 6/18/2024) 90 tablet 3    [DISCONTINUED] rosuvastatin (CRESTOR) 5 mg tablet Take 1 tablet (5 mg total) by mouth every evening 90 tablet 1    [DISCONTINUED] tirzepatide (Mounjaro) 5 MG/0.5ML Inject 0.75 mL (7.5 mg total) under the skin every 7 days (Patient not taking: Reported on 6/18/2024) 2 mL 0    [DISCONTINUED] tirzepatide (Mounjaro) 7.5 MG/0.5ML Inject 0.5 mL (7.5 mg total) under the skin every 7 days 2 mL 0  "    No current facility-administered medications on file prior to visit.     Allergies   Allergen Reactions    Hydromorphone Other (See Comments)     hypotension  hypotension      Objective     /86 (BP Location: Left arm, Patient Position: Sitting, Cuff Size: Standard)   Pulse 81   Temp 99.2 °F (37.3 °C) (Tympanic)   Resp 18   Ht 5' 10\" (1.778 m)   Wt (!) 165 kg (364 lb)   SpO2 95%   BMI 52.23 kg/m²     Physical Exam  Vitals and nursing note reviewed.   Constitutional:       General: He is not in acute distress.     Appearance: Normal appearance. He is well-developed. He is obese.   HENT:      Head: Normocephalic and atraumatic.   Eyes:      Conjunctiva/sclera: Conjunctivae normal.   Cardiovascular:      Rate and Rhythm: Normal rate and regular rhythm.      Heart sounds: No murmur heard.  Pulmonary:      Effort: Pulmonary effort is normal. No respiratory distress.      Breath sounds: Normal breath sounds.   Abdominal:      Tenderness: There is no abdominal tenderness.   Musculoskeletal:         General: No swelling.      Cervical back: Neck supple.   Skin:     General: Skin is warm and dry.      Capillary Refill: Capillary refill takes less than 2 seconds.   Neurological:      Mental Status: He is alert.   Psychiatric:         Mood and Affect: Mood normal.       Administrative Statements   I have spent a total time of 15 minutes on 06/18/24 In caring for this patient including Instructions for management, Risk factor reductions, Documenting in the medical record, and Obtaining or reviewing history  .        "

## 2024-06-19 ENCOUNTER — TELEPHONE (OUTPATIENT)
Dept: INTERNAL MEDICINE CLINIC | Facility: CLINIC | Age: 64
End: 2024-06-19

## 2024-06-19 DIAGNOSIS — E11.9 TYPE 2 DIABETES MELLITUS WITHOUT COMPLICATION, WITHOUT LONG-TERM CURRENT USE OF INSULIN (HCC): ICD-10-CM

## 2024-06-19 NOTE — TELEPHONE ENCOUNTER
Reason for call:   [x] Prior Auth  [] Other:     Caller:  [x] Patient  [] Pharmacy  Name:   Address:   Callback Number:     Medication: tirzepatide (Mounjaro) 7.5 MG/0.5ML     Dose/Frequency: 7.5 mg, Subcutaneous, Every 7 days     Quantity:  3 ordered     Ordering Provider:   [x] PCP/Provider - Javy Ronquillo MD   [] Speciality/Provider -

## 2024-06-24 NOTE — TELEPHONE ENCOUNTER
Duplicate PA Request please see telephone encounter from 4/30/2024 regarding Mounjaro PA. Please review patient's chart to see status of PA before creating another encounter.  Thank You.

## 2024-06-25 ENCOUNTER — VBI (OUTPATIENT)
Dept: ADMINISTRATIVE | Facility: OTHER | Age: 64
End: 2024-06-25

## 2024-06-25 NOTE — TELEPHONE ENCOUNTER
06/25/24 11:53 AM     Chart reviewed for Diabetic Eye Exam was/were submitted to the patient's insurance.     Deborah Lim MA   [facility]

## 2024-07-15 ENCOUNTER — VBI (OUTPATIENT)
Dept: ADMINISTRATIVE | Facility: OTHER | Age: 64
End: 2024-07-15

## 2024-07-15 NOTE — TELEPHONE ENCOUNTER
07/15/24 10:11 AM     Chart reviewed for Diabetic Eye Exam was/were submitted to the patient's insurance.     Deborah Lim MA   [facility]

## 2024-08-13 DIAGNOSIS — I10 ESSENTIAL HYPERTENSION: ICD-10-CM

## 2024-08-13 RX ORDER — METOPROLOL SUCCINATE 100 MG/1
100 TABLET, EXTENDED RELEASE ORAL DAILY
Qty: 90 TABLET | Refills: 0 | Status: SHIPPED | OUTPATIENT
Start: 2024-08-13 | End: 2024-11-11

## 2024-08-13 NOTE — TELEPHONE ENCOUNTER
Received fax from [facility] requesting refill for pt on   Requested Prescriptions     Pending Prescriptions Disp Refills    metoprolol succinate (TOPROL-XL) 100 mg 24 hr tablet 90 tablet 1     Sig: Take 1 tablet (100 mg total) by mouth daily       LAST SEEN: 6/18/24  NEXT APPT: 10/1/24  LAST FILLED: 3/12/24

## 2024-11-08 ENCOUNTER — APPOINTMENT (OUTPATIENT)
Dept: LAB | Facility: CLINIC | Age: 64
End: 2024-11-08
Payer: COMMERCIAL

## 2024-11-08 DIAGNOSIS — E11.9 TYPE 2 DIABETES MELLITUS WITHOUT COMPLICATION, WITHOUT LONG-TERM CURRENT USE OF INSULIN (HCC): ICD-10-CM

## 2024-11-08 LAB
ALBUMIN SERPL BCG-MCNC: 4.4 G/DL (ref 3.5–5)
ALP SERPL-CCNC: 86 U/L (ref 34–104)
ALT SERPL W P-5'-P-CCNC: 27 U/L (ref 7–52)
ANION GAP SERPL CALCULATED.3IONS-SCNC: 10 MMOL/L (ref 4–13)
AST SERPL W P-5'-P-CCNC: 20 U/L (ref 13–39)
BASOPHILS # BLD AUTO: 0.04 THOUSANDS/ÂΜL (ref 0–0.1)
BASOPHILS NFR BLD AUTO: 1 % (ref 0–1)
BILIRUB SERPL-MCNC: 0.3 MG/DL (ref 0.2–1)
BUN SERPL-MCNC: 27 MG/DL (ref 5–25)
CALCIUM SERPL-MCNC: 9.5 MG/DL (ref 8.4–10.2)
CHLORIDE SERPL-SCNC: 105 MMOL/L (ref 96–108)
CHOLEST SERPL-MCNC: 103 MG/DL
CO2 SERPL-SCNC: 25 MMOL/L (ref 21–32)
CREAT SERPL-MCNC: 1.28 MG/DL (ref 0.6–1.3)
CREAT UR-MCNC: 146.5 MG/DL
EOSINOPHIL # BLD AUTO: 0.21 THOUSAND/ÂΜL (ref 0–0.61)
EOSINOPHIL NFR BLD AUTO: 3 % (ref 0–6)
ERYTHROCYTE [DISTWIDTH] IN BLOOD BY AUTOMATED COUNT: 15.9 % (ref 11.6–15.1)
EST. AVERAGE GLUCOSE BLD GHB EST-MCNC: 103 MG/DL
GFR SERPL CREATININE-BSD FRML MDRD: 59 ML/MIN/1.73SQ M
GLUCOSE P FAST SERPL-MCNC: 103 MG/DL (ref 65–99)
HBA1C MFR BLD: 5.2 %
HCT VFR BLD AUTO: 47.1 % (ref 36.5–49.3)
HDLC SERPL-MCNC: 45 MG/DL
HGB BLD-MCNC: 14 G/DL (ref 12–17)
IMM GRANULOCYTES # BLD AUTO: 0.03 THOUSAND/UL (ref 0–0.2)
IMM GRANULOCYTES NFR BLD AUTO: 0 % (ref 0–2)
LDLC SERPL CALC-MCNC: 44 MG/DL (ref 0–100)
LYMPHOCYTES # BLD AUTO: 1.12 THOUSANDS/ÂΜL (ref 0.6–4.47)
LYMPHOCYTES NFR BLD AUTO: 14 % (ref 14–44)
MCH RBC QN AUTO: 25.4 PG (ref 26.8–34.3)
MCHC RBC AUTO-ENTMCNC: 29.7 G/DL (ref 31.4–37.4)
MCV RBC AUTO: 86 FL (ref 82–98)
MICROALBUMIN UR-MCNC: 17.4 MG/L
MICROALBUMIN/CREAT 24H UR: 12 MG/G CREATININE (ref 0–30)
MONOCYTES # BLD AUTO: 0.64 THOUSAND/ÂΜL (ref 0.17–1.22)
MONOCYTES NFR BLD AUTO: 8 % (ref 4–12)
NEUTROPHILS # BLD AUTO: 5.76 THOUSANDS/ÂΜL (ref 1.85–7.62)
NEUTS SEG NFR BLD AUTO: 74 % (ref 43–75)
NRBC BLD AUTO-RTO: 0 /100 WBCS
PLATELET # BLD AUTO: 235 THOUSANDS/UL (ref 149–390)
PMV BLD AUTO: 10.6 FL (ref 8.9–12.7)
POTASSIUM SERPL-SCNC: 4.2 MMOL/L (ref 3.5–5.3)
PROT SERPL-MCNC: 7.4 G/DL (ref 6.4–8.4)
RBC # BLD AUTO: 5.51 MILLION/UL (ref 3.88–5.62)
SODIUM SERPL-SCNC: 140 MMOL/L (ref 135–147)
TRIGL SERPL-MCNC: 70 MG/DL
TSH SERPL DL<=0.05 MIU/L-ACNC: 2.19 UIU/ML (ref 0.45–4.5)
WBC # BLD AUTO: 7.8 THOUSAND/UL (ref 4.31–10.16)

## 2024-11-08 PROCEDURE — 83036 HEMOGLOBIN GLYCOSYLATED A1C: CPT

## 2024-11-08 PROCEDURE — 85025 COMPLETE CBC W/AUTO DIFF WBC: CPT

## 2024-11-08 PROCEDURE — 80061 LIPID PANEL: CPT

## 2024-11-08 PROCEDURE — 80053 COMPREHEN METABOLIC PANEL: CPT

## 2024-11-08 PROCEDURE — 82570 ASSAY OF URINE CREATININE: CPT

## 2024-11-08 PROCEDURE — 36415 COLL VENOUS BLD VENIPUNCTURE: CPT

## 2024-11-08 PROCEDURE — 84443 ASSAY THYROID STIM HORMONE: CPT

## 2024-11-08 PROCEDURE — 82043 UR ALBUMIN QUANTITATIVE: CPT

## 2024-11-11 ENCOUNTER — OFFICE VISIT (OUTPATIENT)
Dept: INTERNAL MEDICINE CLINIC | Facility: CLINIC | Age: 64
End: 2024-11-11
Payer: COMMERCIAL

## 2024-11-11 VITALS
OXYGEN SATURATION: 97 % | SYSTOLIC BLOOD PRESSURE: 144 MMHG | RESPIRATION RATE: 18 BRPM | WEIGHT: 315 LBS | DIASTOLIC BLOOD PRESSURE: 92 MMHG | HEIGHT: 70 IN | HEART RATE: 76 BPM | BODY MASS INDEX: 45.1 KG/M2

## 2024-11-11 DIAGNOSIS — F41.9 ANXIETY: ICD-10-CM

## 2024-11-11 DIAGNOSIS — I10 ESSENTIAL HYPERTENSION: ICD-10-CM

## 2024-11-11 DIAGNOSIS — Z00.00 ANNUAL PHYSICAL EXAM: Primary | ICD-10-CM

## 2024-11-11 DIAGNOSIS — G47.33 OBSTRUCTIVE SLEEP APNEA: ICD-10-CM

## 2024-11-11 DIAGNOSIS — E11.9 TYPE 2 DIABETES MELLITUS WITHOUT COMPLICATION, WITHOUT LONG-TERM CURRENT USE OF INSULIN (HCC): ICD-10-CM

## 2024-11-11 PROCEDURE — 99396 PREV VISIT EST AGE 40-64: CPT | Performed by: INTERNAL MEDICINE

## 2024-11-11 PROCEDURE — 99214 OFFICE O/P EST MOD 30 MIN: CPT | Performed by: INTERNAL MEDICINE

## 2024-11-11 RX ORDER — METOPROLOL SUCCINATE 100 MG/1
100 TABLET, EXTENDED RELEASE ORAL DAILY
Qty: 90 TABLET | Refills: 0 | Status: SHIPPED | OUTPATIENT
Start: 2024-11-11 | End: 2025-02-09

## 2024-11-11 RX ORDER — TIRZEPATIDE 10 MG/.5ML
10 INJECTION, SOLUTION SUBCUTANEOUS WEEKLY
Qty: 6 ML | Refills: 0 | Status: SHIPPED | OUTPATIENT
Start: 2024-11-11

## 2024-11-11 NOTE — PROGRESS NOTES
Adult Annual Physical  Name: Lizandro Sanchez      : 1960      MRN: [id number]  Encounter Provider: Javy Ronquillo MD  Encounter Date: 2024   Encounter department: [facility] INTERNAL MEDICINE [place]    Assessment & Plan  Annual physical exam  Physical exam completed.       Type 2 diabetes mellitus without complication, without long-term current use of insulin (HCC)    Lab Results   Component Value Date    HGBA1C 5.2 2024     A1c controlled. Has more weight loss to lose, increase mounjaro to 10 mg.  Orders:  •  Tirzepatide (Mounjaro) 10 MG/0.5ML SOAJ; Inject 10 mg under the skin once a week    Essential hypertension  Better controlled at home. Continue BB.    Orders:  •  metoprolol succinate (TOPROL-XL) 100 mg 24 hr tablet; Take 1 tablet (100 mg total) by mouth daily  •  Basic metabolic panel; Future    Obstructive sleep apnea  Continue use of CPAP. Continue sleep medicine follow up.         Anxiety  Patient is in a good place, would like to wean off the zoloft, 150-- 100-- 75--50-- 25-- 12.5. monitor for brain zaps.         Immunizations and preventive care screenings were discussed with patient today. Appropriate education was printed on patient's after visit summary.    Discussed risks and benefits of prostate cancer screening. We discussed the controversial history of PSA screening for prostate cancer in the [place] as well as the risk of over detection and over treatment of prostate cancer by way of PSA screening.  The patient understands that PSA blood testing is an imperfect way to screen for prostate cancer and that elevated PSA levels in the blood may also be caused by infection, inflammation, prostatic trauma or manipulation, urological procedures, or by benign prostatic enlargement.    The role of the digital rectal examination in prostate cancer screening was also discussed and I discussed with him that there is large interobserver variability in the findings of  digital rectal examination.    Counseling:  Alcohol/drug use: discussed moderation in alcohol intake, the recommendations for healthy alcohol use, and avoidance of illicit drug use.  Dental Health: discussed importance of regular tooth brushing, flossing, and dental visits.  Exercise: the importance of regular exercise/physical activity was discussed. Recommend exercise 3-5 times per week for at least 30 minutes.       Depression Screening and Follow-up Plan: Patient was screened for depression during today's encounter. They screened negative with a PHQ-2 score of 0.Patient with underlying depression and was advised to continue current medications as prescribed.       History of Present Illness     Adult Annual Physical:  Patient presents for annual physical.     Diet and Physical Activity:  - Diet/Nutrition: well balanced diet.  - Exercise: no formal exercise.    General Health:  - Sleep: sleeps well.  - Hearing: normal hearing bilateral ears.  - Vision: no vision problems.  - Dental: regular dental visits.     Health:  - History of STDs: no.   - Urinary symptoms: none.     Advanced Care Planning:  - Has an advanced directive?: no    - Has a durable medical POA?: no    - ACP document given to patient?: no      Review of Systems   Constitutional:  Negative for chills and fever.   HENT:  Negative for ear pain and sore throat.    Eyes:  Negative for pain and visual disturbance.   Respiratory:  Negative for cough and shortness of breath.    Cardiovascular:  Negative for chest pain and palpitations.   Gastrointestinal:  Negative for abdominal pain and vomiting.   Genitourinary:  Negative for dysuria and hematuria.   Musculoskeletal:  Negative for arthralgias and back pain.   Skin:  Negative for color change and rash.   Neurological:  Negative for seizures and syncope.   All other systems reviewed and are negative.    Past Medical History   Past Medical History:   Diagnosis Date   • Anxiety    • CPAP (continuous  "positive airway pressure) dependence    • Hypertension    • Sleep apnea    • Venous stasis ulcers of both lower extremities (HCC)      Past Surgical History:   Procedure Laterality Date   • HERNIA REPAIR      umbilical   • [place] ENDOVEN ABLTJ INCMPTNT VEIN XTR LASER 1ST VEIN Left 8/12/2021    Procedure: ENDOVASCULAR LASER THERAPY (EVLT), Left GSV;  Surgeon: Francesco Dillard MD;  Location: [facility] OR;  Service: Vascular   • TONSILLECTOMY       History reviewed. No pertinent family history.  Current Outpatient Medications on File Prior to Visit   Medication Sig Dispense Refill   • sertraline (ZOLOFT) 100 mg tablet Take 1.5 tablets (150 mg total) by mouth daily 135 tablet 3   • [DISCONTINUED] metoprolol succinate (TOPROL-XL) 100 mg 24 hr tablet Take 1 tablet (100 mg total) by mouth daily 90 tablet 0   • [DISCONTINUED] tirzepatide (Mounjaro) 7.5 MG/0.5ML Inject 0.5 mL (7.5 mg total) under the skin every 7 days 2 mL 0     No current facility-administered medications on file prior to visit.     Allergies   Allergen Reactions   • Hydromorphone Other (See Comments)     hypotension  hypotension        Objective     /92 (BP Location: Left arm, Patient Position: Sitting, Cuff Size: Adult)   Pulse 76   Resp 18   Ht 5' 10\" (1.778 m)   Wt (!) 161 kg (355 lb)   SpO2 97%   BMI 50.94 kg/m²     Physical Exam  Vitals and nursing note reviewed.   Constitutional:       General: He is not in acute distress.     Appearance: He is well-developed.   HENT:      Head: Normocephalic and atraumatic.   Eyes:      Conjunctiva/sclera: Conjunctivae normal.   Cardiovascular:      Rate and Rhythm: Normal rate and regular rhythm.      Heart sounds: No murmur heard.  Pulmonary:      Effort: Pulmonary effort is normal. No respiratory distress.      Breath sounds: Normal breath sounds.   Abdominal:      Tenderness: There is no abdominal tenderness.   Musculoskeletal:         General: No swelling.      Cervical back: Neck supple.   Skin:     " General: Skin is warm and dry.      Capillary Refill: Capillary refill takes less than 2 seconds.   Neurological:      Mental Status: He is alert.   Psychiatric:         Mood and Affect: Mood normal.       I have spent a total time of 25 minutes in caring for this patient on the day of the visit/encounter including Impressions, Counseling / Coordination of care, and Reviewing / ordering tests, medicine, procedures  .

## 2024-11-11 NOTE — ASSESSMENT & PLAN NOTE
Better controlled at home. Continue BB.    Orders:    metoprolol succinate (TOPROL-XL) 100 mg 24 hr tablet; Take 1 tablet (100 mg total) by mouth daily    Basic metabolic panel; Future

## 2024-11-11 NOTE — ASSESSMENT & PLAN NOTE
Patient is in a good place, would like to wean off the zoloft, 150-- 100-- 75--50-- 25-- 12.5. monitor for brain zaps.

## 2024-11-11 NOTE — ASSESSMENT & PLAN NOTE
Lab Results   Component Value Date    HGBA1C 5.2 11/08/2024     A1c controlled. Has more weight loss to lose, increase mounjaro to 10 mg.  Orders:    Tirzepatide (Mounjaro) 10 MG/0.5ML SOAJ; Inject 10 mg under the skin once a week

## 2024-11-12 ENCOUNTER — TELEPHONE (OUTPATIENT)
Age: 64
End: 2024-11-12

## 2024-11-12 NOTE — TELEPHONE ENCOUNTER
PA for Tirzepatide (Mounjaro) 10 MG/0.5ML SOAJ SUBMITTED to [employer]    via    []CMM-KEY:   []Surescripts-Case ID #   []Availity-Auth ID # NDC #   []Faxed to plan   [x]Other website [employer] - ID [id number]  []Phone call Case ID #     [x]PA sent as URGENT    All office notes, labs and other pertaining documents and studies sent. Clinical questions answered. Awaiting determination from insurance company.     Turnaround time for your insurance to make a decision on your Prior Authorization can take 7-21 business days.

## 2024-11-15 NOTE — TELEPHONE ENCOUNTER
PA for Mounjaro 10 mg  CANCELLED due to     Spoke with Berkley at [facility] the PA was cancelled due to a lifetime auth for all strengths of the medication.     Message sent to office clinical pool   Yes    Scanned into Media  no

## 2024-12-25 ENCOUNTER — HOSPITAL ENCOUNTER (EMERGENCY)
Facility: HOSPITAL | Age: 64
Discharge: HOME | End: 2024-12-25
Attending: FAMILY MEDICINE
Payer: COMMERCIAL

## 2024-12-25 ENCOUNTER — APPOINTMENT (EMERGENCY)
Dept: RADIOLOGY | Facility: HOSPITAL | Age: 64
End: 2024-12-25
Payer: COMMERCIAL

## 2024-12-25 VITALS
HEART RATE: 94 BPM | OXYGEN SATURATION: 98 % | RESPIRATION RATE: 17 BRPM | TEMPERATURE: 98.4 F | SYSTOLIC BLOOD PRESSURE: 185 MMHG | DIASTOLIC BLOOD PRESSURE: 105 MMHG

## 2024-12-25 DIAGNOSIS — S61.252A OPEN WOUND OF RIGHT MIDDLE FINGER DUE TO DOG BITE: Primary | ICD-10-CM
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
## 2024-12-25 DIAGNOSIS — S62.632A FRACTURE OF DISTAL PHALANX OF RIGHT MIDDLE FINGER: ICD-10-CM

## 2024-12-25 PROCEDURE — 99284 EMERGENCY DEPT VISIT MOD MDM: CPT

## 2024-12-25 PROCEDURE — 90471 IMMUNIZATION ADMIN: CPT

## 2024-12-25 PROCEDURE — 73130 X-RAY EXAM OF HAND: CPT

## 2024-12-25 PROCEDURE — 12002 RPR S/N/AX/GEN/TRNK2.6-7.5CM: CPT

## 2024-12-25 PROCEDURE — 90715 TDAP VACCINE 7 YRS/> IM: CPT

## 2024-12-25 PROCEDURE — 99283 EMERGENCY DEPT VISIT LOW MDM: CPT

## 2024-12-25 PROCEDURE — 96365 THER/PROPH/DIAG IV INF INIT: CPT

## 2024-12-25 RX ORDER — GINSENG 100 MG
1 CAPSULE ORAL ONCE
Status: COMPLETED | OUTPATIENT
Start: 2024-12-25 | End: 2024-12-25

## 2024-12-25 RX ORDER — LIDOCAINE HYDROCHLORIDE 20 MG/ML
6 INJECTION, SOLUTION EPIDURAL; INFILTRATION; INTRACAUDAL; PERINEURAL ONCE
Status: COMPLETED | OUTPATIENT
Start: 2024-12-25 | End: 2024-12-25

## 2024-12-25 RX ORDER — OXYCODONE HYDROCHLORIDE 5 MG/1
5 TABLET ORAL EVERY 6 HOURS PRN
Qty: 6 TABLET | Refills: 0 | Status: SHIPPED | OUTPATIENT
Start: 2024-12-25 | End: 2025-01-04

## 2024-12-25 RX ORDER — ACETAMINOPHEN 325 MG/1
650 TABLET ORAL ONCE
Status: COMPLETED | OUTPATIENT
Start: 2024-12-25 | End: 2024-12-25

## 2024-12-25 RX ORDER — OXYCODONE HYDROCHLORIDE 5 MG/1
5 TABLET ORAL ONCE
Refills: 0 | Status: COMPLETED | OUTPATIENT
Start: 2024-12-25 | End: 2024-12-25

## 2024-12-25 RX ADMIN — ACETAMINOPHEN 650 MG: 325 TABLET ORAL at 14:24

## 2024-12-25 RX ADMIN — LIDOCAINE HYDROCHLORIDE 6 ML: 20 INJECTION, SOLUTION EPIDURAL; INFILTRATION; INTRACAUDAL; PERINEURAL at 14:26

## 2024-12-25 RX ADMIN — OXYCODONE HYDROCHLORIDE 5 MG: 5 TABLET ORAL at 14:24

## 2024-12-25 RX ADMIN — AMPICILLIN SODIUM AND SULBACTAM SODIUM 3 G: 2; 1 INJECTION, POWDER, FOR SOLUTION INTRAMUSCULAR; INTRAVENOUS at 15:06

## 2024-12-25 RX ADMIN — AMOXICILLIN AND CLAVULANATE POTASSIUM 1 TABLET: 875; 125 TABLET, FILM COATED ORAL at 14:24

## 2024-12-25 RX ADMIN — TETANUS TOXOID, REDUCED DIPHTHERIA TOXOID AND ACELLULAR PERTUSSIS VACCINE, ADSORBED 0.5 ML: 5; 2.5; 8; 8; 2.5 SUSPENSION INTRAMUSCULAR at 14:24

## 2024-12-25 RX ADMIN — BACITRACIN ZINC 1 SMALL APPLICATION: 500 OINTMENT TOPICAL at 16:18

## 2024-12-25 NOTE — DISCHARGE INSTRUCTIONS
Immediately return to the emergency room if you experience any new or worsening symptoms or if the symptoms are lasting longer than expected.     Please follow-up with hand surgery ASAP to discuss your bite/open fracture. Please  the Augmentin from your pharmacy and take as prescribed. Please take ibuprofen and Tylenol for mild-moderate pain and take the the oxycodone for severe. Keep the wound clean and dry. Continue changing the dressings daily.    Please take ibuprofen (Motrin, Advil) or acetaminophen (Tylenol) as needed for pain. These are available over-the-counter. You can take ibuprofen 400-600 mg every 4-6 hours with food for pain. You can also take acetaminophen 500-650 mg every 4-6 hours for pain. Do not exceed 4000 mg of Tylenol a day as this can cause liver damage. Do not drink alcohol with either of these medications. Evidence-based research has shown taking these 2 drugs together work the same (or better in some cases) for pain than opioids or narcotic pain medication.

## 2024-12-25 NOTE — ED PROVIDER NOTES
Time reflects when diagnosis was documented in both MDM as applicable and the Disposition within this note       Time User Action Codes Description Comment    12/25/2024  2:42 PM Gorge Mo Add [S61.252A,  W54.0XXA] Open wound of right middle finger due to dog bite     12/25/2024  2:43 PM Gorge Mo Add [S67.632A] Fracture of distal phalanx of right middle finger     12/25/2024  4:03 PM Gorge Mo Modify [J42.606M] Open fracture of distal phalanx of right middle finger           ED Disposition       ED Disposition   Discharge    Condition   Stable    Date/Time   Wed Dec 25, 2024  3:59 PM    Comment   Lizandro Sanchez discharge to home/self care.                   Assessment & Plan       Medical Decision Making  Patient is a well-appearing 64-year-old male presenting with dog bite to his right distal middle finger. He was petting his dog's back legs and his dog got out of hand and ended up biting his right distal middle finger. His dog is up-to-date on vaccinations including rabies. See clinical image attached. Plan is to soak his right distal middle finger with a combination of Betadine and saline and reach out to hand surgery for recommendations. Will obtain x-ray of his right hand to evaluate for acute fracture and give Augmentin for prophylaxis in the meantime. Will update his tetanus. Will give Tylenol and oxycodone tablet for pain.  Patient appears to have an open third digit distal phalanx/tuft fracture. Will give dose of IV Unasyn. See ED course for hand surgery recommendations. I extensively irrigated the wound with 2 L of NSS and performed a digital block with good analgesia. I then proceeded to remove the nail and the loosely approximate the laceration/avulsion with 4-0 chromic gut (5-0 was unavailable). Five  stitches were loosely placed and bacitracin was applied followed by petroleum gauze followed by 4 x 4 and Nick followed by a finger splint. I sent prescriptions for Augmentin and  oxycodone into his pharmacy and provided STAT referral to hand surgery and additional wound dressings for home.  Dispo: Patient is safe/stable for discharge home and was discharged home with strict return precautions. Provided verbal and written supportive care instructions for managing his illness. Advised patient to return to the nearest emergency room if he has new or worsening symptoms or if any questions arise. Advised patient to follow-up with his family doctor and hand surgery. Patient and wife are satisfied with care and agree with management and plan.     Amount and/or Complexity of Data Reviewed  External Data Reviewed: labs, radiology and notes.  Radiology: ordered and independent interpretation performed.    Risk  OTC drugs.  Prescription drug management.        ED Course as of 12/25/24 1702   Wed Dec 25, 2024   1345 Blood Pressure(!): 185/105  Elevated BP.  Other vital signs reviewed and within normal limits.   1421 SC sent to orthopedic hand surgery for recommendations.   1437 Per hand surgery, digital block, remove nail. Thorough bedside irrigation (2 L ) nail bed repair with chromic gut and soft tissue closure preferably with chromic gut . In addition to 3 gram unasyn IV should have 10 days of PO Augmentin .The injury looks like there should be sufficient soft tissue to get bone covered. Nail plate should be removed and repair the nail bed. Leave the nail off.       Medications   amoxicillin-clavulanate (AUGMENTIN) 875-125 mg per tablet 1 tablet (1 tablet Oral Given 12/25/24 1424)   tetanus-diphtheria-acellular pertussis (BOOSTRIX) IM injection 0.5 mL (0.5 mL Intramuscular Given 12/25/24 1424)   acetaminophen (TYLENOL) tablet 650 mg (650 mg Oral Given 12/25/24 1424)   oxyCODONE (ROXICODONE) IR tablet 5 mg (5 mg Oral Given 12/25/24 1424)   lidocaine (PF) (XYLOCAINE-MPF) 2 % injection 6 mL (6 mL Injection Given 12/25/24 1426)   ampicillin-sulbactam (UNASYN) 3 g in sodium chloride 0.9 % 100 mL IVPB (0 g  Intravenous Stopped 12/25/24 1536)   bacitracin topical ointment 1 small application (1 small application Topical Given 12/25/24 1618)       ED Risk Strat Scores                                              History of Present Illness       Chief Complaint   Patient presents with    Dog Bite     Right middle finger         Past Medical History:   Diagnosis Date    Anxiety     CPAP (continuous positive airway pressure) dependence     Hypertension     Sleep apnea     Venous stasis ulcers of both lower extremities (HCC)       Past Surgical History:   Procedure Laterality Date    HERNIA REPAIR      umbilical    [place] ENDOVEN ABLTJ INCMPTNT VEIN XTR LASER 1ST VEIN Left 8/12/2021    Procedure: ENDOVASCULAR LASER THERAPY (EVLT), Left GSV;  Surgeon: Francesco Dillard MD;  Location: [facility];  Service: Vascular    TONSILLECTOMY        History reviewed. No pertinent family history.   Social History     Tobacco Use    Smoking status: Never    Smokeless tobacco: Never   Vaping Use    Vaping status: Never Used   Substance Use Topics    Alcohol use: Not Currently     Alcohol/week: 3.0 standard drinks of alcohol     Types: 2 Cans of beer, 1 Shots of liquor per week     Comment: daily    Drug use: Never      E-Cigarette/Vaping    E-Cigarette Use Never User       E-Cigarette/Vaping Substances    Nicotine No     THC No     CBD No     Flavoring No     Other No     Unknown No       I have reviewed and agree with the history as documented.     Patient is a 64-year-old male with relevant past medical history of anxiety, CPAP dependence, hypertension, and sleep apnea presenting with dog bite to his right middle finger. He was petting his dog's back legs and his dog got out of hand and ended up biting his right distal middle finger. His dog is a pitbull mix and is getting older he reports and he needs to put him down. He quickly got the bleeding controlled from his middle finger and rinsed it with peroxide and his wife wrapped it. He  complains of 7/10 throbbing pain in his distal right middle finger and did not take anything for it. Dog is up-to-date on shots. He denies fevers, chills, discharge, paresthesias, headache, dizziness, chest pain, dyspnea, abdominal pain, nausea, or vomiting.              History provided by:  Spouse and patient  Dog Bite  Associated symptoms: no fever and no rash        Review of Systems   Constitutional:  Negative for chills and fever.   HENT:  Negative for congestion, ear pain, rhinorrhea and sore throat.    Eyes:  Negative for pain and visual disturbance.   Respiratory:  Negative for cough and shortness of breath.    Cardiovascular:  Negative for chest pain and palpitations.   Gastrointestinal:  Negative for abdominal pain, constipation, diarrhea, nausea and vomiting.   Genitourinary:  Negative for dysuria, frequency, hematuria and urgency.   Musculoskeletal:  Negative for arthralgias and back pain.        Dog bite tip of right middle finger.    Skin:  Negative for color change and rash.   Neurological:  Negative for dizziness, seizures, syncope, light-headedness and headaches.   Psychiatric/Behavioral:  Negative for agitation and confusion.            Objective       ED Triage Vitals [12/25/24 1344]   Temperature Pulse Blood Pressure Respirations SpO2 Patient Position - Orthostatic VS   98.4 °F (36.9 °C) 94 (!) 185/105 17 98 % Sitting      Temp Source Heart Rate Source BP Location FiO2 (%) Pain Score    Tympanic Monitor Left arm -- 8      Vitals      Date and Time Temp Pulse SpO2 Resp BP Pain Score FACES Pain Rating User   12/25/24 1424 -- -- -- -- -- 8 -- NAD   12/25/24 1344 98.4 °F (36.9 °C) 94 98 % 17 185/105 8 -- NAD            Physical Exam  Vitals and nursing note reviewed.   Constitutional:       General: He is not in acute distress.     Appearance: He is well-developed. He is obese. He is not ill-appearing.   HENT:      Head: Normocephalic and atraumatic.   Eyes:      Conjunctiva/sclera: Conjunctivae  normal.   Cardiovascular:      Rate and Rhythm: Normal rate and regular rhythm.      Heart sounds: No murmur heard.  Pulmonary:      Effort: Pulmonary effort is normal. No respiratory distress.      Breath sounds: Normal breath sounds. No wheezing, rhonchi or rales.   Abdominal:      Palpations: Abdomen is soft.      Tenderness: There is no abdominal tenderness. There is no guarding or rebound.   Musculoskeletal:         General: No swelling.      Right hand: Deformity and laceration present. Normal strength. Normal sensation. Normal capillary refill.      Cervical back: Neck supple.      Comments: Patient has a dog bite to his right distal middle finger with mild oozing. Right hand is neurovascularly intact with good pulses, sensation, and motor function. Tendons intact. See clinical image attached.   Skin:     General: Skin is warm and dry.      Capillary Refill: Capillary refill takes less than 2 seconds.   Neurological:      General: No focal deficit present.      Mental Status: He is alert and oriented to person, place, and time.      GCS: GCS eye subscore is 4. GCS verbal subscore is 5. GCS motor subscore is 6.      Cranial Nerves: Cranial nerves 2-12 are intact.      Sensory: Sensation is intact.      Motor: Motor function is intact.      Coordination: Coordination is intact.      Gait: Gait is intact.   Psychiatric:         Mood and Affect: Mood normal.              Results Reviewed       None            XR hand 3+ views RIGHT   ED Interpretation by Gorge Mo PA-C (12/25 7534)   Distal fracture of third digit.      Final Interpretation by Kashif Del Rio MD (12/25 8661)      Severely comminuted fracture of the third distal tuft with overlying soft tissue defect. Additional nondisplaced fracture at the base of the third distal phalanx.      This report is in agreement with the preliminary interpretation.      Computerized Assisted Algorithm (CAA) may have been used to analyze all applicable images.          Workstation performed: [id number]             Universal Protocol:  Procedure performed by:  Consent: Verbal consent obtained. Written consent not obtained.  Risks and benefits: risks, benefits and alternatives were discussed  Consent given by: patient  Patient understanding: patient states understanding of the procedure being performed  Patient consent: the patient's understanding of the procedure matches consent given  Procedure consent: procedure consent matches procedure scheduled  Relevant documents: relevant documents present and verified  Test results: test results available and properly labeled  Radiology Images displayed and confirmed. If images not available, report reviewed: imaging studies available  Patient identity confirmed: verbally with patient and arm band  Laceration repair    Date/Time: 12/25/2024 2:56 PM    Performed by: Gorge Mo PA-C  Authorized by: Gorge Mo PA-C  Body area: upper extremity  Location details: right long finger  Laceration length: 3 cm  Foreign bodies: no foreign bodies  Tendon involvement: none  Nerve involvement: none  Vascular damage: no  Anesthesia: digital block    Anesthesia:  Local Anesthetic: lidocaine 1% without epinephrine  Anesthetic total: 6 mL    Sedation:  Patient sedated: no      Wound Dehiscence:  Superficial Wound Dehiscence: simple closure      Procedure Details:  Preparation: Patient was prepped and draped in the usual sterile fashion.  Irrigation solution: saline and tap water  Irrigation method: syringe  Amount of cleaning: extensive  Debridement: none  Degree of undermining: none  Mucous membrane closure: 4-0 Chromic gut  Number of sutures: 5  Technique: simple  Approximation: loose  Approximation difficulty: complex  Dressing: antibiotic ointment, 4x4 sterile gauze and gauze packing  Patient tolerance: patient tolerated the procedure well with no immediate complications          ED Medication and Procedure Management   Prior to  Admission Medications   Prescriptions Last Dose Informant Patient Reported? Taking?   Tirzepatide (Mounjaro) 10 MG/0.5ML SOAJ   No No   Sig: Inject 10 mg under the skin once a week   metoprolol succinate (TOPROL-XL) 100 mg 24 hr tablet   No No   Sig: Take 1 tablet (100 mg total) by mouth daily   sertraline (ZOLOFT) 100 mg tablet   No No   Sig: Take 1.5 tablets (150 mg total) by mouth daily      Facility-Administered Medications: None     Discharge Medication List as of 12/25/2024  4:18 PM        START taking these medications    Details   amoxicillin-clavulanate (AUGMENTIN) 875-125 mg per tablet Take 1 tablet by mouth every 12 (twelve) hours for 10 days, Starting Wed 12/25/2024, Until Sat 1/4/2025, Normal      oxyCODONE (Roxicodone) 5 immediate release tablet Take 1 tablet (5 mg total) by mouth every 6 (six) hours as needed for moderate pain or severe pain for up to 10 days Max Daily Amount: 20 mg, Starting Wed 12/25/2024, Until Sat 1/4/2025 at 2359, Normal           CONTINUE these medications which have NOT CHANGED    Details   metoprolol succinate (TOPROL-XL) 100 mg 24 hr tablet Take 1 tablet (100 mg total) by mouth daily, Starting Mon 11/11/2024, Until Sun 2/9/2025, Normal      sertraline (ZOLOFT) 100 mg tablet Take 1.5 tablets (150 mg total) by mouth daily, Starting Wed 5/15/2024, Normal      Tirzepatide (Mounjaro) 10 MG/0.5ML SOAJ Inject 10 mg under the skin once a week, Starting Mon 11/11/2024, Normal             ED SEPSIS DOCUMENTATION   Time reflects when diagnosis was documented in both MDM as applicable and the Disposition within this note       Time User Action Codes Description Comment    12/25/2024  2:42 PM Gorge Mo Add [S61.252A,  W54.0XXA] Open wound of right middle finger due to dog bite     12/25/2024  2:43 PM Gorge Mo Add [S60.921D] Fracture of distal phalanx of right middle finger     12/25/2024  4:03 PM Gorge Mo Modify [Z32.632A] Open fracture of distal phalanx of right  middle finger                  Gorge Mo PA-C  12/25/24 3217

## 2024-12-27 ENCOUNTER — OFFICE VISIT (OUTPATIENT)
Dept: OBGYN CLINIC | Facility: CLINIC | Age: 64
End: 2024-12-27
Payer: COMMERCIAL

## 2024-12-27 VITALS — BODY MASS INDEX: 50.94 KG/M2 | WEIGHT: 315 LBS

## 2024-12-27 DIAGNOSIS — S61.252A OPEN WOUND OF RIGHT MIDDLE FINGER DUE TO DOG BITE: ICD-10-CM
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
## 2024-12-27 PROCEDURE — 29130 APPL FINGER SPLINT STATIC: CPT | Performed by: ORTHOPAEDIC SURGERY

## 2024-12-27 PROCEDURE — 99203 OFFICE O/P NEW LOW 30 MIN: CPT | Performed by: ORTHOPAEDIC SURGERY

## 2024-12-27 NOTE — PROGRESS NOTES
The HAND & UPPER EXTREMITY OFFICE VISIT   Referred By:  Alphonse Crawford-keenan  [address] [facility] [place] [id number]    Splint application    Date/Time: 12/27/2024 9:00 AM    Performed by: Brando Valentine MD  Authorized by: Brando Valentine MD  Universal Protocol:  Consent: Verbal consent obtained.  Risks and benefits: risks, benefits and alternatives were discussed  Consent given by: patient  Patient identity confirmed: verbally with patient    Pre-procedure details:     Sensation:  Normal  Procedure details:     Laterality:  Right    Location:  Finger    Finger:  R long finger    Splint type:  Finger splint, static  Post-procedure details:     Pain:  Unchanged    Sensation:  Unchanged    Patient tolerance of procedure:  Tolerated well, no immediate complications      No procedures performed    Chief Complaint:     Right hand Middle finger dog bite    History of Present Illness:   64 y.o., Rigth hand hand dominant male presents with dog bite to Right middle finger  Pateint states his dog bit his right middle finger on 12/25/2024. He was just petting his dog when the dog bit him. He wrapped his finger and applied pressure to get the bleeding under control before he went to ED. He was seen in the ED and his finger was XR and she was advised of the fracture at the tip of the finger. His wound was cleaned and the tip was sutured. He was then told to see orthopedics as they may have to amputate his finger tip or remove the bony fragments  He has started taking his antibiotics    ADLs: Community ambulator/  Smoke: denies ETOH: denies   Drugs:  denies Job: employed       Past Medical History:  Past Medical History:   Diagnosis Date    Anxiety     CPAP (continuous positive airway pressure) dependence     Hypertension     Sleep apnea     Venous stasis ulcers of both lower extremities (HCC)      Past Surgical History:   Procedure Laterality Date    HERNIA REPAIR      umbilical    [place] ENDOVEN ABLTJ  INCMPTNT VEIN XTR LASER 1ST VEIN Left 8/12/2021    Procedure: ENDOVASCULAR LASER THERAPY (EVLT), Left GSV;  Surgeon: Francesco Dillard MD;  Location: [facility];  Service: Vascular    TONSILLECTOMY       History reviewed. No pertinent family history.  Social History     Socioeconomic History    Marital status: /Civil Union     Spouse name: Not on file    Number of children: Not on file    Years of education: Not on file    Highest education level: Not on file   Occupational History    Not on file   Tobacco Use    Smoking status: Never    Smokeless tobacco: Never   Vaping Use    Vaping status: Never Used   Substance and Sexual Activity    Alcohol use: Not Currently     Alcohol/week: 3.0 standard drinks of alcohol     Types: 2 Cans of beer, 1 Shots of liquor per week     Comment: daily    Drug use: Never    Sexual activity: Yes     Partners: Female   Other Topics Concern    Not on file   Social History Narrative    Not on file     Social Drivers of Health     Financial Resource Strain: Not on file   Food Insecurity: Not on file   Transportation Needs: Not on file   Physical Activity: Not on file   Stress: Not on file   Social Connections: Unknown (6/18/2024)    Received from [employer]     How often do you feel lonely or isolated from those around you? (Adult - for ages 18 years and over): Not on file   Intimate Partner Violence: Not on file   Housing Stability: Not on file     Scheduled Meds:  Continuous Infusions:No current facility-administered medications for this visit.    PRN Meds:.  Allergies   Allergen Reactions    Hydromorphone Other (See Comments)     hypotension  hypotension           Physical Examination:    Wt (!) 161 kg (355 lb)   BMI 50.94 kg/m²     Gen: A&Ox3, NAD  Cardiac: regular rate  Chest: non labored breathing  Abdomen: Non-distended      Right Upper Extremity:  Nail bed exposed with nail previously removed  Sutured wound on radial finger tip extending more vertically  down the nailbed  Sensation intact to light touch in the axillary median, ulnar, and radial nerve distributions  Sensation intact at radial and ulnar aspects distal finger  2+RP  Able to flex and extend PIP and DIP of middle finger but motion limited due to stiffness    Left Upper Extremity:  Skin CDI  No obvious deformity of the shoulder, arm, elbow, forearm, wrist, hand  Composite fist  Sensation intact to light touch in the axillary median, ulnar, and radial nerve distributions  motor intact  2+RP      Studies:  Radiographs: I personally reviewed and independently interpreted the available radiographs.  12/25/2024: Radiographs of the Right hand, multiple views, demonstrate comminuted tuft fracture with partial fingertip amputation.  DIP joint congruent      Assessment and Plan:  1. Open wound of right middle finger due to dog bite  Ambulatory Referral to Orthopedic Surgery          64 y.o. male presents with signs and symptoms consistent with the above diagnosis.  We discussed the natural history of this condition and its pathogenesis.  We discussed operative and nonoperative treatment options.  His wound was inspected and there is no signs of infection.  We discussed management of his prescription antibiotics.  He should finish his antibiotics that were prescribed but at this point it does not appear like he would need an extra prescription.     His wound showed heal well with continued wound care and protection, and he may start to perform daily dressing changes. He may shower allowing warm soapy water to run over the wound but no soaking of the finger. He may use Neosporin or another tropical antibiotics under the bandages. His sutures will fall out over time so there is no need for suture removal     A finger splint was applied to protect his finger tip and DIP joint, but leave his PIP and MP free. He should wear this at all times except for hygiene for the next 3-4 weeks and work on PIP joint ROM as he is  stiff.      We discussed nail growth may take several months and may or may not grow normally    It is recommended they Return in about 2 weeks (around 1/10/2025).  he expressed understanding of the plan and agreed. We encouraged them to contact our office with any questions or concerns.         Brando Valentine MD  Hand and Upper Extremity Surgery        *This note was dictated using Dragon voice recognition software. Please excuse any word substitutions or errors.*

## 2025-01-17 ENCOUNTER — OFFICE VISIT (OUTPATIENT)
Dept: OBGYN CLINIC | Facility: CLINIC | Age: 65
End: 2025-01-17
Payer: COMMERCIAL

## 2025-01-17 DIAGNOSIS — S61.252A OPEN WOUND OF RIGHT MIDDLE FINGER DUE TO DOG BITE: Primary | ICD-10-CM
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
## 2025-01-17 PROCEDURE — 99213 OFFICE O/P EST LOW 20 MIN: CPT | Performed by: ORTHOPAEDIC SURGERY

## 2025-01-17 NOTE — PROGRESS NOTES
The HAND & UPPER EXTREMITY OFFICE VISIT   Referred By:  No referring provider defined for this encounter.      Chief Complaint:     Right hand middle finger dog bite    Previous History:  12/25/2024: Seen in ED for dog bit to tip of right middle finger when petting dog. Advised he had a fx at the tip of the finger. Wound was irrigated, nail removed, and sutured with 2-0 chromic gut sutures. Was sent oral Augmentin and oxycodone.    12/27/2024: Seen by Dr. Valentine who recommended Adaptic and gauze wrap or Neosporin and bandage wrap.  Patient was provided a splint to immobilize the DIP joint but leave the PIP joint free.  Patient was instructed to complete his antibiotics.      History of Present Illness:   64 y.o., Right hand hand dominant male presents for follow-up of dog bite to the tip of the right middle finger sustained on 12/25/2024.  He has completed his course of Augmentin.  Patient reports he has been using Neosporin with bandage as he feels the Adaptic and gauze is sticking to his wound.  There is some bloody drainage from the wound.  He denies any distal numbness or tingling.  He reports he has function of the DIP and PIP joints comparable to his other fingers.  He denies any redness, drainage, fevers, chills.    ADLs: Community Ambulator  Smoke: denies ETOH: denies   Drugs:  denies Job: employed       Past Medical History:  Past Medical History:   Diagnosis Date    Anxiety     CPAP (continuous positive airway pressure) dependence     Hypertension     Sleep apnea     Venous stasis ulcers of both lower extremities (HCC)      Past Surgical History:   Procedure Laterality Date    HERNIA REPAIR      umbilical    [place] ENDOVEN ABLTJ INCMPTNT VEIN XTR LASER 1ST VEIN Left 8/12/2021    Procedure: ENDOVASCULAR LASER THERAPY (EVLT), Left GSV;  Surgeon: Francesco Dillard MD;  Location: [facility];  Service: Vascular    TONSILLECTOMY       No family history on file.  Social History     Socioeconomic History    Marital  status: /Civil Union     Spouse name: Not on file    Number of children: Not on file    Years of education: Not on file    Highest education level: Not on file   Occupational History    Not on file   Tobacco Use    Smoking status: Never    Smokeless tobacco: Never   Vaping Use    Vaping status: Never Used   Substance and Sexual Activity    Alcohol use: Not Currently     Alcohol/week: 3.0 standard drinks of alcohol     Types: 2 Cans of beer, 1 Shots of liquor per week     Comment: daily    Drug use: Never    Sexual activity: Yes     Partners: Female   Other Topics Concern    Not on file   Social History Narrative    Not on file     Social Drivers of Health     Financial Resource Strain: Not on file   Food Insecurity: Not on file   Transportation Needs: Not on file   Physical Activity: Not on file   Stress: Not on file   Social Connections: Unknown (6/18/2024)    Received from [employer]     How often do you feel lonely or isolated from those around you? (Adult - for ages 18 years and over): Not on file   Intimate Partner Violence: Not on file   Housing Stability: Not on file     Scheduled Meds:  Continuous Infusions:No current facility-administered medications for this visit.    PRN Meds:.  Allergies   Allergen Reactions    Hydromorphone Other (See Comments)     hypotension  hypotension           Physical Examination:    There were no vitals taken for this visit.    Gen: A&Ox3, NAD  Cardiac: regular rate  Chest: non labored breathing  Abdomen: Non-distended      Right Upper Extremity:   Nail bed healing with nail previously removed  Sutured wound on radial finger tip extending more vertically down the nailbed healing with granulation layer present.  Sensation intact to light touch in the axillary median, ulnar, and radial nerve distributions  Sensation intact at radial and ulnar aspects distal finger  No drainage.  2+RP  Able to flex and extend PIP and DIP of middle finger but motion  comparable to other fingers.    Left Upper Extremity:  Skin CDI  No obvious deformity of the shoulder, arm, elbow, forearm, wrist, hand  Composite fist  Sensation intact to light touch in the axillary median, ulnar, and radial nerve distributions  motor intact  2+RP      Studies:  Radiographs: I personally reviewed and independently interpreted the available radiographs.  12/25/2024: Radiographs of the Right hand, multiple views, demonstrate comminuted tuft fracture with partial fingertip amputation.  DIP joint congruent      Assessment and Plan:  1. Open wound of right middle finger due to dog bite              64 y.o. male presents with signs and symptoms consistent with the above diagnosis.  We discussed the natural history of this condition and its pathogenesis.  We discussed operative and nonoperative treatment options.  His wound was inspected and there is no signs of infection. He has completed a course of antibiotics. His wound is healing with granulation layer present. Nail was removed in the ED. Explained his wound should heal over the next couple weeks. Explained his sutures will fall out. We discussed nail growth may take several months and may or may not grow normally. He was instructed to keep the finger covered with xeroform and bandage. Extra xeroform provided. Patient may use DIP finger splint as needed for support and protection. He should follow-up in four weeks for wound check and xrays of the right middle finger. he expressed understanding of the plan and agreed. We encouraged them to contact our office with any questions or concerns.         Brando Valentine MD  Hand and Upper Extremity Surgery        *This note was dictated using Dragon voice recognition software. Please excuse any word substitutions or errors.*    Scribe Attestation      I,:  Julissa Olivia am acting as a scribe while in the presence of the attending physician.:       I,:  Brando Valentine MD personally performed the  services described in this documentation    as scribed in my presence.:

## 2025-02-10 DIAGNOSIS — I10 ESSENTIAL HYPERTENSION: ICD-10-CM

## 2025-02-11 DIAGNOSIS — I10 ESSENTIAL HYPERTENSION: ICD-10-CM

## 2025-02-11 RX ORDER — METOPROLOL SUCCINATE 100 MG/1
100 TABLET, EXTENDED RELEASE ORAL DAILY
Qty: 90 TABLET | Refills: 0 | OUTPATIENT
Start: 2025-02-11 | End: 2025-05-12

## 2025-02-11 RX ORDER — METOPROLOL SUCCINATE 100 MG/1
100 TABLET, EXTENDED RELEASE ORAL DAILY
Qty: 90 TABLET | Refills: 1 | Status: SHIPPED | OUTPATIENT
Start: 2025-02-11 | End: 2025-05-12

## 2025-02-11 NOTE — TELEPHONE ENCOUNTER
Patient called in to check the status of refill. Patient made aware that we are waiting for approval.     Does the patient have enough for 3 days?   [x] Yes   [] No - Send as HP to POD

## 2025-02-14 ENCOUNTER — OFFICE VISIT (OUTPATIENT)
Dept: OBGYN CLINIC | Facility: CLINIC | Age: 65
End: 2025-02-14
Payer: COMMERCIAL

## 2025-02-14 DIAGNOSIS — S61.252A OPEN WOUND OF RIGHT MIDDLE FINGER DUE TO DOG BITE: Primary | ICD-10-CM
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
## 2025-02-14 PROCEDURE — 99213 OFFICE O/P EST LOW 20 MIN: CPT | Performed by: ORTHOPAEDIC SURGERY

## 2025-02-14 NOTE — PROGRESS NOTES
HAND & UPPER EXTREMITY OFFICE VISIT   Referred By:  No referring provider defined for this encounter.      Chief Complaint:     Right hand middle finger dog bite    Previous History:   12/25/2024: Seen in ED for dog bit to tip of right middle finger when petting dog. Advised he had a fx at the tip of the finger. Wound was irrigated, nail removed, and sutured with 2-0 chromic gut sutures. Was sent oral Augmentin and oxycodone.     12/27/2024: Seen by Dr. Valentine who recommended Adaptic and gauze wrap or Neosporin and bandage wrap.  Patient was provided a splint to immobilize the DIP joint but leave the PIP joint free.  Patient was instructed to complete his antibiotics.    1/17/25 Seen for follow up, recommended to continue to cover wound with xeroform and bandage, with DIP finger plint for support and protection as needed.    Interval History:  Today he reports he is having no pain. Wound has been healing appropriately without any drainage or erythema. He has been working on DIP range of motion.     Past Medical History:  Past Medical History:   Diagnosis Date    Anxiety     CPAP (continuous positive airway pressure) dependence     Hypertension     Sleep apnea     Venous stasis ulcers of both lower extremities (HCC)      Past Surgical History:   Procedure Laterality Date    HERNIA REPAIR      umbilical    [place] ENDOVEN ABLTJ INCMPTNT VEIN XTR LASER 1ST VEIN Left 8/12/2021    Procedure: ENDOVASCULAR LASER THERAPY (EVLT), Left GSV;  Surgeon: Francesco Dillard MD;  Location: [facility] OR;  Service: Vascular    TONSILLECTOMY       History reviewed. No pertinent family history.  Social History     Socioeconomic History    Marital status: /Civil Union     Spouse name: Not on file    Number of children: Not on file    Years of education: Not on file    Highest education level: Not on file   Occupational History    Not on file   Tobacco Use    Smoking status: Never    Smokeless tobacco: Never   Vaping Use    Vaping  status: Never Used   Substance and Sexual Activity    Alcohol use: Not Currently     Alcohol/week: 3.0 standard drinks of alcohol     Types: 2 Cans of beer, 1 Shots of liquor per week     Comment: daily    Drug use: Never    Sexual activity: Yes     Partners: Female   Other Topics Concern    Not on file   Social History Narrative    Not on file     Social Drivers of Health     Financial Resource Strain: Not on file   Food Insecurity: Not on file   Transportation Needs: Not on file   Physical Activity: Not on file   Stress: Not on file   Social Connections: Unknown (6/18/2024)    Received from [facility]     How often do you feel lonely or isolated from those around you? (Adult - for ages 18 years and over): Not on file   Intimate Partner Violence: Not on file   Housing Stability: Not on file     Scheduled Meds:  Continuous Infusions:No current facility-administered medications for this visit.    PRN Meds:.  Allergies   Allergen Reactions    Hydromorphone Other (See Comments)     hypotension  hypotension       Physical Examination:    There were no vitals taken for this visit.    Gen: A&Ox3, NAD  Cardiac: regular rate  Chest: non labored breathing  Abdomen: Non-distended    Right Upper Extremity:  Nail bed healing with nail previously removed. No new nail growth  Granulation tissue to finger tip over previous site of laceration, healing appropriately wihtout erythema or drainage  SILT and 2-pt discrimination intact   Able to flex and extend the PIP and DIP of middle finger  Digit WWP    Studies:  Radiographs: N/a    Assessment and Plan:  1. Open wound of right middle finger due to dog bite            64 y.o. male presents in follow up for the above diagnosis. We discussed that his wound is continuing to heal appropriately without signs of complication or infection. Discussed that it will continue to granulate in. Discussed that nail growth may take several months or may not grow normally. He may  now use the hand for activities of daily living. We will plan to see him back in one month for repeat wound check.    he expressed understanding of the plan and agreed. We encouraged them to contact our office with any questions or concerns.       Brando Valentine MD  Hand and Upper Extremity Surgery      *This note was dictated using Dragon voice recognition software. Please excuse any word substitutions or errors.*

## 2025-02-21 DIAGNOSIS — E11.9 TYPE 2 DIABETES MELLITUS WITHOUT COMPLICATION, WITHOUT LONG-TERM CURRENT USE OF INSULIN (HCC): ICD-10-CM

## 2025-02-21 RX ORDER — TIRZEPATIDE 10 MG/.5ML
10 INJECTION, SOLUTION SUBCUTANEOUS WEEKLY
Qty: 6 ML | Refills: 0 | Status: SHIPPED | OUTPATIENT
Start: 2025-02-21

## 2025-02-21 NOTE — TELEPHONE ENCOUNTER
Reason for call:   [x] Refill   [] Prior Auth  [] Other:     Office:   [x] PCP/Provider - [facility] - Javy Ronquillo MD   [] Specialty/Provider -     Medication: Tirzepatide (Mounjaro) 10 MG/0.5ML SOAJ     Dose/Frequency: Inject 10 mg under the skin once a week     Quantity: 6 mL     [id number] [phone]    Does the patient have enough for 3 days?   [] Yes   [x] No - Send as HP to POD

## 2025-03-17 ENCOUNTER — APPOINTMENT (OUTPATIENT)
Dept: LAB | Facility: CLINIC | Age: 65
End: 2025-03-17
Payer: COMMERCIAL

## 2025-03-17 DIAGNOSIS — I10 ESSENTIAL HYPERTENSION: ICD-10-CM

## 2025-03-17 PROCEDURE — 36415 COLL VENOUS BLD VENIPUNCTURE: CPT

## 2025-03-17 PROCEDURE — 80048 BASIC METABOLIC PNL TOTAL CA: CPT

## 2025-03-18 ENCOUNTER — RESULTS FOLLOW-UP (OUTPATIENT)
Dept: INTERNAL MEDICINE CLINIC | Facility: CLINIC | Age: 65
End: 2025-03-18

## 2025-03-18 LAB
ANION GAP SERPL CALCULATED.3IONS-SCNC: 11 MMOL/L (ref 4–13)
BUN SERPL-MCNC: 22 MG/DL (ref 5–25)
CALCIUM SERPL-MCNC: 9.5 MG/DL (ref 8.4–10.2)
CHLORIDE SERPL-SCNC: 105 MMOL/L (ref 96–108)
CO2 SERPL-SCNC: 25 MMOL/L (ref 21–32)
CREAT SERPL-MCNC: 1.26 MG/DL (ref 0.6–1.3)
GFR SERPL CREATININE-BSD FRML MDRD: 60 ML/MIN/1.73SQ M
GLUCOSE P FAST SERPL-MCNC: 97 MG/DL (ref 65–99)
POTASSIUM SERPL-SCNC: 4.5 MMOL/L (ref 3.5–5.3)
SODIUM SERPL-SCNC: 141 MMOL/L (ref 135–147)

## 2025-03-19 ENCOUNTER — OFFICE VISIT (OUTPATIENT)
Dept: INTERNAL MEDICINE CLINIC | Facility: CLINIC | Age: 65
End: 2025-03-19
Payer: COMMERCIAL

## 2025-03-19 VITALS
HEART RATE: 82 BPM | HEIGHT: 70 IN | DIASTOLIC BLOOD PRESSURE: 92 MMHG | BODY MASS INDEX: 45.1 KG/M2 | RESPIRATION RATE: 17 BRPM | OXYGEN SATURATION: 99 % | WEIGHT: 315 LBS | SYSTOLIC BLOOD PRESSURE: 164 MMHG

## 2025-03-19 DIAGNOSIS — F41.9 ANXIETY: ICD-10-CM

## 2025-03-19 DIAGNOSIS — E11.9 TYPE 2 DIABETES MELLITUS WITHOUT COMPLICATION, WITHOUT LONG-TERM CURRENT USE OF INSULIN (HCC): Primary | ICD-10-CM

## 2025-03-19 DIAGNOSIS — G47.33 OBSTRUCTIVE SLEEP APNEA: ICD-10-CM

## 2025-03-19 DIAGNOSIS — I10 ESSENTIAL HYPERTENSION: ICD-10-CM

## 2025-03-19 PROCEDURE — 99214 OFFICE O/P EST MOD 30 MIN: CPT | Performed by: INTERNAL MEDICINE

## 2025-03-19 RX ORDER — METOPROLOL SUCCINATE 100 MG/1
100 TABLET, EXTENDED RELEASE ORAL DAILY
Qty: 90 TABLET | Refills: 1 | Status: SHIPPED | OUTPATIENT
Start: 2025-03-19 | End: 2025-06-17

## 2025-03-19 RX ORDER — TIRZEPATIDE 12.5 MG/.5ML
12.5 INJECTION, SOLUTION SUBCUTANEOUS WEEKLY
Qty: 2 ML | Refills: 0 | Status: SHIPPED | OUTPATIENT
Start: 2025-03-19

## 2025-03-19 NOTE — ASSESSMENT & PLAN NOTE
He is off the zoloft, says he has been struggling with his mood, causing him to eat, discussed developing healthy coping mechanisms.

## 2025-03-19 NOTE — ASSESSMENT & PLAN NOTE
Better controlled at home. Continue BB.    Orders:    metoprolol succinate (TOPROL-XL) 100 mg 24 hr tablet; Take 1 tablet (100 mg total) by mouth daily

## 2025-03-19 NOTE — PROGRESS NOTES
Name: Lizandro Sanchez      : 1960      MRN: [id number]  Encounter Provider: Javy Ronquillo MD  Encounter Date: 3/19/2025   Encounter department: [facility] [place]  :  Assessment & Plan  Type 2 diabetes mellitus without complication, without long-term current use of insulin ([facility])    Lab Results   Component Value Date    HGBA1C 5.2 2024     A1c controlled.   Orders:  •  Tirzepatide (Mounjaro) 12.5 MG/0.5ML SOAJ; Inject 12.5 mg under the skin once a week  •  Albumin / creatinine urine ratio; Future  •  Comprehensive metabolic panel; Future  •  Hemoglobin A1C; Future  •  CBC and Platelet; Future      Essential hypertension  Better controlled at home. Continue BB.    Orders:  •  metoprolol succinate (TOPROL-XL) 100 mg 24 hr tablet; Take 1 tablet (100 mg total) by mouth daily      Obstructive sleep apnea  Continue use of CPAP. Continue sleep medicine follow up.           Anxiety  He is off the zoloft, says he has been struggling with his mood, causing him to eat, discussed developing healthy coping mechanisms.                 Depression Screening and Follow-up Plan: Patient was screened for depression during today's encounter. They screened negative with a PHQ-2 score of 0.      History of Present Illness   Patient is here for routine follow up, reviewed chronic medical problems.    Review of Systems   Constitutional:  Negative for chills and fever.   HENT:  Negative for ear pain and sore throat.    Eyes:  Negative for pain and visual disturbance.   Respiratory:  Negative for cough and shortness of breath.    Cardiovascular:  Negative for chest pain and palpitations.   Gastrointestinal:  Negative for abdominal pain and vomiting.   Genitourinary:  Negative for dysuria and hematuria.   Musculoskeletal:  Negative for arthralgias and back pain.   Skin:  Negative for color change and rash.   Neurological:  Negative for seizures and syncope.   All other systems reviewed and are  "negative.      Objective   /92 (BP Location: Left arm, Patient Position: Sitting, Cuff Size: Large)   Pulse 82   Resp 17   Ht 5' 10\" (1.778 m)   Wt (!) 157 kg (347 lb)   SpO2 99%   BMI 49.79 kg/m²      Physical Exam  Vitals and nursing note reviewed.   Constitutional:       General: He is not in acute distress.     Appearance: He is well-developed. He is obese.   HENT:      Head: Normocephalic and atraumatic.   Cardiovascular:      Rate and Rhythm: Normal rate and regular rhythm.      Heart sounds: No murmur heard.  Pulmonary:      Effort: Pulmonary effort is normal. No respiratory distress.      Breath sounds: Normal breath sounds.   Abdominal:      Tenderness: There is no abdominal tenderness.   Musculoskeletal:         General: No swelling.      Cervical back: Neck supple.   Skin:     General: Skin is warm and dry.      Capillary Refill: Capillary refill takes less than 2 seconds.   Neurological:      Mental Status: He is alert.   Psychiatric:         Mood and Affect: Mood normal.       "

## 2025-03-21 ENCOUNTER — OFFICE VISIT (OUTPATIENT)
Dept: OBGYN CLINIC | Facility: CLINIC | Age: 65
End: 2025-03-21
Payer: COMMERCIAL
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
## 2025-03-21 DIAGNOSIS — S61.252A OPEN WOUND OF RIGHT MIDDLE FINGER DUE TO DOG BITE: Primary | ICD-10-CM

## 2025-03-21 PROCEDURE — 99213 OFFICE O/P EST LOW 20 MIN: CPT | Performed by: ORTHOPAEDIC SURGERY

## 2025-03-21 NOTE — PROGRESS NOTES
HAND & UPPER EXTREMITY OFFICE VISIT   Referred By:  No referring provider defined for this encounter.      Chief Complaint:     Right hand middle finger dog bite    Previous History:   12/25/2024: Seen in ED for dog bit to tip of right middle finger when petting dog. Advised he had a fx at the tip of the finger. Wound was irrigated, nail removed, and sutured with 2-0 chromic gut sutures. Was sent oral Augmentin and oxycodone.     12/27/2024: Seen by Dr. Valentine who recommended Adaptic and gauze wrap or Neosporin and bandage wrap.  Patient was provided a splint to immobilize the DIP joint but leave the PIP joint free.  Patient was instructed to complete his antibiotics.    1/17/25 Seen for follow up, recommended to continue to cover wound with xeroform and bandage, with DIP finger plint for support and protection as needed.    2/14/2025 Seen for follow up, Wound healing well. We discussed this will continue to improve and we discussed nail growth    Interval History:  Today he reports he continues to have no pain. Wound has healed well. He has minimal nail growth noted    Past Medical History:  Past Medical History:   Diagnosis Date    Anxiety     CPAP (continuous positive airway pressure) dependence     Hypertension     Sleep apnea     Venous stasis ulcers of both lower extremities (HCC)      Past Surgical History:   Procedure Laterality Date    HERNIA REPAIR      umbilical    [place] ENDOVEN ABLTJ INCMPTNT VEIN XTR LASER 1ST VEIN Left 8/12/2021    Procedure: ENDOVASCULAR LASER THERAPY (EVLT), Left GSV;  Surgeon: Francesco Dillard MD;  Location: [facility];  Service: Vascular    TONSILLECTOMY       History reviewed. No pertinent family history.  Social History     Socioeconomic History    Marital status: /Civil Union     Spouse name: Not on file    Number of children: Not on file    Years of education: Not on file    Highest education level: Not on file   Occupational History    Not on file   Tobacco Use     Smoking status: Never    Smokeless tobacco: Never   Vaping Use    Vaping status: Never Used   Substance and Sexual Activity    Alcohol use: Not Currently     Alcohol/week: 3.0 standard drinks of alcohol     Types: 2 Cans of beer, 1 Shots of liquor per week     Comment: daily    Drug use: Never    Sexual activity: Yes     Partners: Female   Other Topics Concern    Not on file   Social History Narrative    Not on file     Social Drivers of Health     Financial Resource Strain: Not on file   Food Insecurity: Not on file   Transportation Needs: Not on file   Physical Activity: Not on file   Stress: Not on file   Social Connections: Unknown (6/18/2024)    Received from [employer]     How often do you feel lonely or isolated from those around you? (Adult - for ages 18 years and over): Not on file   Intimate Partner Violence: Not on file   Housing Stability: Not on file     Scheduled Meds:  Continuous Infusions:No current facility-administered medications for this visit.    PRN Meds:.  Allergies   Allergen Reactions    Hydromorphone Other (See Comments)     hypotension  hypotension       Physical Examination:    There were no vitals taken for this visit.    Gen: A&Ox3, NAD  Cardiac: regular rate  Chest: non labored breathing  Abdomen: Non-distended    Right Upper Extremity:  Nail bed healed with new nail growth noted  Finger tip well healed without erythema or drainage  SILT and 2-pt discrimination intact   Able touch tip to palm  Digit WWP    Studies:  Radiographs: N/a    Assessment and Plan:  Assessment & Plan  Open wound of right middle finger due to dog bite  Doing well and finger tip is healing appropriately  He has some new nail growth noted. We discussed this can grow slowly and may or may not grow normally.  ROM is doing well but he should continue to work on ROM of the digits  I recommend he return as needed         64 y.o. male presents in follow up for the above diagnosis.     he expressed  understanding of the plan and agreed. We encouraged them to contact our office with any questions or concerns.       Brando Valentine MD  Hand and Upper Extremity Surgery      *This note was dictated using Dragon voice recognition software. Please excuse any word substitutions or errors.*

## 2025-04-18 DIAGNOSIS — E11.9 TYPE 2 DIABETES MELLITUS WITHOUT COMPLICATION, WITHOUT LONG-TERM CURRENT USE OF INSULIN (HCC): ICD-10-CM

## 2025-04-18 RX ORDER — TIRZEPATIDE 12.5 MG/.5ML
12.5 INJECTION, SOLUTION SUBCUTANEOUS WEEKLY
Qty: 2 ML | Refills: 0 | Status: SHIPPED | OUTPATIENT
Start: 2025-04-18

## 2025-05-19 DIAGNOSIS — E11.9 TYPE 2 DIABETES MELLITUS WITHOUT COMPLICATION, WITHOUT LONG-TERM CURRENT USE OF INSULIN (HCC): ICD-10-CM

## 2025-05-20 RX ORDER — TIRZEPATIDE 12.5 MG/.5ML
1 INJECTION, SOLUTION SUBCUTANEOUS WEEKLY
Qty: 2 ML | Refills: 1 | Status: SHIPPED | OUTPATIENT
Start: 2025-05-20

## 2025-07-16 DIAGNOSIS — E11.9 TYPE 2 DIABETES MELLITUS WITHOUT COMPLICATION, WITHOUT LONG-TERM CURRENT USE OF INSULIN (HCC): ICD-10-CM

## 2025-07-17 NOTE — TELEPHONE ENCOUNTER
Patient called in to check the status of refill for Tirzepatide (Mounjaro)  . Patient advised a refill was requested on 07/16/25 and is pending approval. Patient verbalized understanding and is in agreement.     Does the patient have enough for 3 days?   [x] Yes patient is due on Sunday-patient has one injection left  [] No - Send as HP to POD

## 2025-07-18 ENCOUNTER — VBI (OUTPATIENT)
Dept: ADMINISTRATIVE | Facility: OTHER | Age: 65
End: 2025-07-18

## 2025-07-18 RX ORDER — TIRZEPATIDE 12.5 MG/.5ML
1 INJECTION, SOLUTION SUBCUTANEOUS WEEKLY
Qty: 2 ML | Refills: 0 | Status: SHIPPED | OUTPATIENT
Start: 2025-07-18

## 2025-07-18 NOTE — TELEPHONE ENCOUNTER
07/18/25 9:32 AM     Chart reviewed for Diabetic Eye Exam was/were submitted to the patient's insurance.     Deborah Lim MA   [facility]

## 2025-08-18 DIAGNOSIS — E11.9 TYPE 2 DIABETES MELLITUS WITHOUT COMPLICATION, WITHOUT LONG-TERM CURRENT USE OF INSULIN (HCC): ICD-10-CM

## 2025-08-20 ENCOUNTER — TELEPHONE (OUTPATIENT)
Age: 65
End: 2025-08-20

## 2025-08-20 DIAGNOSIS — E11.9 TYPE 2 DIABETES MELLITUS WITHOUT COMPLICATION, WITHOUT LONG-TERM CURRENT USE OF INSULIN (HCC): ICD-10-CM

## 2025-08-20 RX ORDER — TIRZEPATIDE 12.5 MG/.5ML
1 INJECTION, SOLUTION SUBCUTANEOUS WEEKLY
Qty: 2 ML | Refills: 0 | Status: SHIPPED | OUTPATIENT
Start: 2025-08-20 | End: 2025-08-20 | Stop reason: SDUPTHER

## 2025-08-20 RX ORDER — TIRZEPATIDE 12.5 MG/.5ML
1 INJECTION, SOLUTION SUBCUTANEOUS WEEKLY
Qty: 2 ML | Refills: 0 | Status: SHIPPED | OUTPATIENT
Start: 2025-08-20 | End: 2025-08-27

## (undated) DEVICE — 3M™ STERI-STRIP™ REINFORCED ADHESIVE SKIN CLOSURES, R1546, 1/4 IN X 4 IN (6 MM X 100 MM), 10 STRIPS/ENVELOPE: Brand: 3M™ STERI-STRIP™

## (undated) DEVICE — COVER PROBE INTRAOPERATIVE 6 X 96 IN

## (undated) DEVICE — IRR SET 4M PG F/TUMESCENT

## (undated) DEVICE — FIBER PROC KIT GOLD TIP 21G 45CM NEVERTOUCH

## (undated) DEVICE — ULTRASOUND GEL STERILE FOIL PK

## (undated) DEVICE — ACE WRAP 6 IN STERILE

## (undated) DEVICE — DRAPE INTESTINAL ISOLATION BAG

## (undated) DEVICE — DRAPE SHEET X-LG

## (undated) DEVICE — TIBURON SPLIT SHEET: Brand: CONVERTORS

## (undated) DEVICE — TOWEL SURG XR DETECT GREEN STRL RFD

## (undated) DEVICE — MICROPUNCTURE INTRODUCER SET SILHOUETTE TRANSITIONLESS PUSH-PLUS DESIGN - STIFFENED CANNULA WITH NITINOL WIRE GUIDE: Brand: MICROPUNCTURE

## (undated) DEVICE — ACE WRAP 4 IN STERILE

## (undated) DEVICE — INTENDED FOR TISSUE SEPARATION, AND OTHER PROCEDURES THAT REQUIRE A SHARP SURGICAL BLADE TO PUNCTURE OR CUT.: Brand: BARD-PARKER SAFETY BLADES SIZE 11, STERILE

## (undated) DEVICE — LIGHT HANDLE COVER SLEEVE DISP BLUE STELLAR

## (undated) DEVICE — BETHLEHEM UNIVERSAL MINOR GEN: Brand: CARDINAL HEALTH

## (undated) DEVICE — TONGUE DEPRESSOR STERILE

## (undated) DEVICE — NEEDLE SPINAL 22G X 3.5IN  QUINCKE

## (undated) DEVICE — 3M™ TEGADERM™ TRANSPARENT FILM DRESSING FRAME STYLE, 1626W, 4 IN X 4-3/4 IN (10 CM X 12 CM), 50/CT 4CT/CASE: Brand: 3M™ TEGADERM™

## (undated) DEVICE — GLOVE SRG BIOGEL ECLIPSE 7

## (undated) DEVICE — 3M™ STERI-STRIP™ REINFORCED ADHESIVE SKIN CLOSURES, R1542, 1/4 IN X 1-1/2 IN (6 MM X 38 MM), 6 STRIPS/ENVELOPE: Brand: 3M™ STERI-STRIP™

## (undated) DEVICE — GAUZE SPONGES,USP TYPE VII GAUZE, 12 PLY: Brand: CURITY

## (undated) DEVICE — STOCKINETTE REGULAR

## (undated) DEVICE — CHLORAPREP HI-LITE 26ML ORANGE

## (undated) DEVICE — KERLIX BANDAGE ROLL: Brand: KERLIX

## (undated) DEVICE — DRAPE SHEET THREE QUARTER

## (undated) DEVICE — COBAN 4 IN STERILE

## (undated) DEVICE — PROXIMATE PLUS MD MULTI-DIRECTIONAL RELEASE SKIN STAPLERS CONTAINS 35 STAINLESS STEEL STAPLES APPROXIMATE CLOSED DIMENSIONS: 6.9MM X 3.9MM WIDE: Brand: PROXIMATE